# Patient Record
Sex: MALE | Race: WHITE | NOT HISPANIC OR LATINO | Employment: FULL TIME | URBAN - METROPOLITAN AREA
[De-identification: names, ages, dates, MRNs, and addresses within clinical notes are randomized per-mention and may not be internally consistent; named-entity substitution may affect disease eponyms.]

---

## 2017-02-06 ENCOUNTER — TELEPHONE (OUTPATIENT)
Dept: FAMILY MEDICINE | Facility: CLINIC | Age: 62
End: 2017-02-06

## 2017-02-06 DIAGNOSIS — A09 TRAVELER'S DIARRHEA: Primary | ICD-10-CM

## 2017-02-06 RX ORDER — CIPROFLOXACIN 500 MG/1
500 TABLET, FILM COATED ORAL 2 TIMES DAILY
Qty: 6 TABLET | Refills: 0 | Status: SHIPPED | OUTPATIENT
Start: 2017-02-06 | End: 2018-01-12

## 2017-04-03 ENCOUNTER — TELEPHONE (OUTPATIENT)
Dept: FAMILY MEDICINE | Facility: CLINIC | Age: 62
End: 2017-04-03

## 2017-04-03 DIAGNOSIS — J32.9 SINUSITIS, UNSPECIFIED CHRONICITY, UNSPECIFIED LOCATION: Primary | ICD-10-CM

## 2017-04-03 RX ORDER — AZITHROMYCIN 250 MG/1
TABLET, FILM COATED ORAL
Qty: 6 TABLET | Refills: 0 | Status: SHIPPED | OUTPATIENT
Start: 2017-04-03 | End: 2018-01-12

## 2017-04-03 NOTE — TELEPHONE ENCOUNTER
Wife works here as RN so she discussed the symptoms and treatment.  recommended symptomatic treatment  Script of chaim sent to pharmacy.  Appointment if no improvement or symptoms get worse.  Dr.Nasima Yahir MD

## 2017-04-03 NOTE — TELEPHONE ENCOUNTER
Patient has been sick/wheezing for 10 days, wakes up sweating.  Been using nebs.  Wife, KRISHNA Whitmore, has requested a zpak.  Belia Orta CMA

## 2017-07-21 ENCOUNTER — TELEPHONE (OUTPATIENT)
Dept: FAMILY MEDICINE | Facility: CLINIC | Age: 62
End: 2017-07-21

## 2017-07-21 NOTE — TELEPHONE ENCOUNTER
Patient and wife calling to verify when last Tdap was given.  Both on the phone with writer.  Patient had a fishing accident and has 2 fish hooks in his hand.  They are on their way to ER and wanted to know when last Tetanus shot was given.  Per JEANETH, last Tdap 2010 & 2011.  No further questions.  Keerthi Hicks, RN

## 2017-07-24 ENCOUNTER — ALLIED HEALTH/NURSE VISIT (OUTPATIENT)
Dept: NURSING | Facility: CLINIC | Age: 62
End: 2017-07-24
Payer: COMMERCIAL

## 2017-07-24 DIAGNOSIS — Z23 NEED FOR TETANUS BOOSTER: Primary | ICD-10-CM

## 2017-07-24 PROCEDURE — 99207 ZZC NO CHARGE NURSE ONLY: CPT

## 2017-07-24 PROCEDURE — 90715 TDAP VACCINE 7 YRS/> IM: CPT

## 2017-07-24 PROCEDURE — 90471 IMMUNIZATION ADMIN: CPT

## 2017-10-12 ENCOUNTER — ALLIED HEALTH/NURSE VISIT (OUTPATIENT)
Dept: NURSING | Facility: CLINIC | Age: 62
End: 2017-10-12
Payer: COMMERCIAL

## 2017-10-12 DIAGNOSIS — Z23 NEED FOR PROPHYLACTIC VACCINATION AND INOCULATION AGAINST INFLUENZA: Primary | ICD-10-CM

## 2017-10-12 PROCEDURE — 99207 ZZC NO CHARGE NURSE ONLY: CPT

## 2017-10-12 PROCEDURE — 90686 IIV4 VACC NO PRSV 0.5 ML IM: CPT

## 2017-10-12 PROCEDURE — 90471 IMMUNIZATION ADMIN: CPT

## 2017-10-12 NOTE — MR AVS SNAPSHOT
"              After Visit Summary   10/12/2017    Guanako Padilla    MRN: [id number]           Patient Information     Date Of Birth          1955        Visit Information        Provider Department      10/12/2017 9:30 AM CR FLU CLINIC [facility]        Today's Diagnoses     Need for prophylactic vaccination and inoculation against influenza    -  1       Follow-ups after your visit        Who to contact     If you have questions or need follow up information about today's clinic visit or your schedule please contact [facility] directly at [phone].  Normal or non-critical lab and imaging results will be communicated to you by Heroes2uhart, letter or phone within 4 business days after the clinic has received the results. If you do not hear from us within 7 days, please contact the clinic through Heroes2uhart or phone. If you have a critical or abnormal lab result, we will notify you by phone as soon as possible.  Submit refill requests through Eyeview or call your pharmacy and they will forward the refill request to us. Please allow 3 business days for your refill to be completed.          Additional Information About Your Visit        MyChart Information     Eyeview lets you send messages to your doctor, view your test results, renew your prescriptions, schedule appointments and more. To sign up, go to [email or web address] . Click on \"Log in\" on the left side of the screen, which will take you to the Welcome page. Then click on \"Sign up Now\" on the right side of the page.     You will be asked to enter the access code listed below, as well as some personal information. Please follow the directions to create your username and password.     Your access code is: [email or web address]  Expires: 10/22/2017  3:05 PM     Your access code will  in 90 days. If you need help or a new code, please call your [facility] or [phone].        Care EveryWhere ID     This is your Care " EveryWhere ID. This could be used by other organizations to access your [place] medical records  [id number]         Blood Pressure from Last 3 Encounters:   12/21/16 121/79   04/25/16 107/68   01/08/16 110/70    Weight from Last 3 Encounters:   12/21/16 240 lb (108.9 kg)   04/25/16 238 lb 11.2 oz (108.3 kg)   01/08/16 225 lb (102.1 kg)              We Performed the Following     FLU VAC, SPLIT VIRUS IM > 3 YO (QUADRIVALENT) [50585]     Vaccine Administration, Initial [72918]        Primary Care Provider Office Phone # Fax #    Marco Quiles [phone] [address] [address]        Equal Access to Services     [facility]: Hadii aad karlos hadasho Sochris, waaxda luqadaha, qaybta kaalmada adeegyada, shelley dodson . So [facility] [phone].    ATENCIÓN: Si habla español, tiene a santana disposición servicios gratuitos de asistencia lingüística. Eli al [phone].    We comply with applicable federal civil rights laws and [place] laws. We do not discriminate on the basis of race, color, national origin, age, disability, sex, sexual orientation, or gender identity.            Thank you!     Thank you for choosing [facility]  for your care. Our goal is always to provide you with excellent care. Hearing back from our patients is one way we can continue to improve our services. Please take a few minutes to complete the written survey that you may receive in the mail after your visit with us. Thank you!             Your Updated Medication List - Protect others around you: Learn how to safely use, store and throw away your medicines at www.disposemymeds.org.          This list is accurate as of: 10/12/17  9:32 AM.  Always use your most recent med list.                   Brand Name Dispense Instructions for use Diagnosis    azithromycin 250 MG tablet    ZITHROMAX    6 tablet    Two tablets first day, then one tablet daily for  four days.    Sinusitis, unspecified chronicity, unspecified location       celecoxib 100 MG capsule    celeBREX    60 capsule    Take 1 capsule (100 mg) by mouth 2 times daily as needed for moderate pain    Primary osteoarthritis of both knees       ciprofloxacin 500 MG tablet    CIPRO    6 tablet    Take 1 tablet (500 mg) by mouth 2 times daily    Traveler's diarrhea       Daily Multivitamin Tabs      1 TABLET DAILY        MAGNESIUM OXIDE PO      Take by mouth daily        NO ACTIVE MEDICATIONS      .        omeprazole 20 MG CR capsule    priLOSEC    30 capsule    Take 1 capsule (20 mg) by mouth daily    Peptic ulcer       vitamin B complex with vitamin C Tabs tablet      Take 0.5 tablets by mouth daily

## 2017-10-12 NOTE — PROGRESS NOTES
Injectable Influenza Immunization Documentation    1.  Is the person to be vaccinated sick today?   No    2. Does the person to be vaccinated have an allergy to a component   of the vaccine?   No    3. Has the person to be vaccinated ever had a serious reaction   to influenza vaccine in the past?   No    4. Has the person to be vaccinated ever had Guillain-Barré syndrome?   No    Form completed by Chevy Ratliff MA

## 2018-01-12 ENCOUNTER — RADIANT APPOINTMENT (OUTPATIENT)
Dept: GENERAL RADIOLOGY | Facility: CLINIC | Age: 63
End: 2018-01-12
Attending: ORTHOPAEDIC SURGERY
Payer: COMMERCIAL

## 2018-01-12 ENCOUNTER — OFFICE VISIT (OUTPATIENT)
Dept: ORTHOPEDICS | Facility: CLINIC | Age: 63
End: 2018-01-12
Payer: COMMERCIAL

## 2018-01-12 VITALS
SYSTOLIC BLOOD PRESSURE: 112 MMHG | HEIGHT: 74 IN | DIASTOLIC BLOOD PRESSURE: 78 MMHG | WEIGHT: 241 LBS | BODY MASS INDEX: 30.93 KG/M2

## 2018-01-12 DIAGNOSIS — M25.522 LEFT ELBOW PAIN: ICD-10-CM

## 2018-01-12 DIAGNOSIS — M19.022 OSTEOARTHRITIS OF LEFT ELBOW, UNSPECIFIED OSTEOARTHRITIS TYPE: ICD-10-CM

## 2018-01-12 DIAGNOSIS — M18.0 PRIMARY OSTEOARTHRITIS OF BOTH FIRST CARPOMETACARPAL JOINTS: ICD-10-CM

## 2018-01-12 DIAGNOSIS — M25.522 LEFT ELBOW PAIN: Primary | ICD-10-CM

## 2018-01-12 PROCEDURE — 99203 OFFICE O/P NEW LOW 30 MIN: CPT | Performed by: ORTHOPAEDIC SURGERY

## 2018-01-12 PROCEDURE — 73080 X-RAY EXAM OF ELBOW: CPT | Mod: LT | Performed by: ORTHOPAEDIC SURGERY

## 2018-01-12 NOTE — MR AVS SNAPSHOT
"              After Visit Summary   2018    Guanako Padilla    MRN: [id number]           Patient Information     Date Of Birth          1955        Visit Information        Provider Department      2018 9:10 AM Geovanny Lopez MD [facility] ORTHOPEDIC SURGERY        Today's Diagnoses     Left elbow pain    -  1    Osteoarthritis of left elbow, unspecified osteoarthritis type        Primary osteoarthritis of both first carpometacarpal joints           Follow-ups after your visit        Who to contact     If you have questions or need follow up information about today's clinic visit or your schedule please contact [facility] ORTHOPEDIC SURGERY directly at [phone].  Normal or non-critical lab and imaging results will be communicated to you by GTFO Ventureshart, letter or phone within 4 business days after the clinic has received the results. If you do not hear from us within 7 days, please contact the clinic through GT[employer] Ventureshart or phone. If you have a critical or abnormal lab result, we will notify you by phone as soon as possible.  Submit refill requests through Weavly or call your pharmacy and they will forward the refill request to us. Please allow 3 business days for your refill to be completed.          Additional Information About Your Visit        MyChart Information     Weavly lets you send messages to your doctor, view your test results, renew your prescriptions, schedule appointments and more. To sign up, go to [email or web address] . Click on \"Log in\" on the left side of the screen, which will take you to the Welcome page. Then click on \"Sign up Now\" on the right side of the page.     You will be asked to enter the access code listed below, as well as some personal information. Please follow the directions to create your username and password.     Your access code is: [email or web address]  Expires: 2018 10:28 AM     Your access code will  in 90 days. If you need help or a new code, " "please call your [place] clinic or [phone].        Care EveryWhere ID     This is your Care EveryWhere ID. This could be used by other organizations to access your [place] medical records  [id number]        Your Vitals Were     Height BMI (Body Mass Index)                6' 1.5\" (1.867 m) 31.36 kg/m2           Blood Pressure from Last 3 Encounters:   01/12/18 112/78   12/21/16 121/79   04/25/16 107/68    Weight from Last 3 Encounters:   01/12/18 241 lb (109.3 kg)   12/21/16 240 lb (108.9 kg)   04/25/16 238 lb 11.2 oz (108.3 kg)               Primary Care Provider Office Phone # Fax #    Marco Quiles [phone] [address]  [place] [address]        Equal Access to Services     [facility]: Hadii aad ku hadasho Soomaali, waaxda luqadaha, qaybta kaalmada adeegyada, waxay jeronimoin hayjudyn skylar dodson . So [facility] [phone].    ATENCIÓN: Si habla español, tiene a santana disposición servicios gratuitos de asistencia lingüística. Eli al [phone].    We comply with applicable federal civil rights laws and [place] laws. We do not discriminate on the basis of race, color, national origin, age, disability, sex, sexual orientation, or gender identity.            Thank you!     Thank you for choosing [facility] ORTHOPEDIC SURGERY  for your care. Our goal is always to provide you with excellent care. Hearing back from our patients is one way we can continue to improve our services. Please take a few minutes to complete the written survey that you may receive in the mail after your visit with us. Thank you!             Your Updated Medication List - Protect others around you: Learn how to safely use, store and throw away your medicines at www.disposemymeds.org.          This list is accurate as of: 1/12/18 10:28 AM.  Always use your most recent med list.                   Brand Name Dispense Instructions for use Diagnosis    celecoxib 100 MG capsule    " celeBREX    60 capsule    Take 1 capsule (100 mg) by mouth 2 times daily as needed for moderate pain    Primary osteoarthritis of both knees       Daily Multivitamin Tabs      1 TABLET DAILY        MAGNESIUM OXIDE PO      Take by mouth daily        NO ACTIVE MEDICATIONS      .        OMEGA 3 PO           omeprazole 20 MG CR capsule    priLOSEC    30 capsule    Take 1 capsule (20 mg) by mouth daily    Peptic ulcer       vitamin B complex with vitamin C Tabs tablet      Take 0.5 tablets by mouth daily        VITAMIN D3 PO      Take by mouth daily

## 2018-01-12 NOTE — PROGRESS NOTES
HISTORY OF PRESENT ILLNESS:    Guanako Padilla is a 62 year old male who is seen as self referral for left elbow pain    Present symptoms: Pt states elbow pain has been present for over 5 years, denies any known injury to the elbow.  Pt feels pain is deeper in the joint, describes pain as aching, can be sharp at times, feels arm is weak as a result.   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  After he started taking omega-3 compound, he has been feeling better.Before that, he was expressing rather significant pain. He has noted limitation of motion now in the left but also on the right. The right side has not been really painful. He denies any neurologic symptoms  He is right-hand dominant.  He also had significant pain with a left thumb CMC joint. Other than residual deformity, he is not experiencing much of pain at this point.  He has also issues with the left knee. He had cortisone injections in the past.  Treatments tried to this point: omega 3  Orthopedic PMH: right foot ORIF     Past Medical History:   Diagnosis Date     Broken foot     fall     Shingles        Past Surgical History:   Procedure Laterality Date     COLONOSCOPY  1/8/2016    Dr. Covarrubias [facility]     COLONOSCOPY  1/8/2016    Procedure: COLONOSCOPY;  Surgeon: Juan Covarrubias MD;  Location:  GI     ESOPHAGOSCOPY, GASTROSCOPY, DUODENOSCOPY (EGD), COMBINED  1/8/2016    Dr. Satish GOMEZ     ESOPHAGOSCOPY, GASTROSCOPY, DUODENOSCOPY (EGD), COMBINED N/A 1/8/2016    Procedure: COMBINED ESOPHAGOSCOPY, GASTROSCOPY, DUODENOSCOPY (EGD), BIOPSY SINGLE OR MULTIPLE;  Surgeon: Juan Covarrubias MD;  Location:  GI     ORTHOPEDIC SURGERY      shoulder-bilateral     ORTHOPEDIC SURGERY      wrist tendon repair     ORTHOPEDIC SURGERY      right foot fx-metal in foot-ORIF       Family History   Problem Relation Age of Onset     Alzheimer Disease Mother      Dementia     CEREBROVASCULAR DISEASE Father      Colon  Cancer No family hx of        Social History     Social History     Marital status:      Spouse name: N/A     Number of children: N/A     Years of education: N/A     Occupational History     Not on file.     Social History Main Topics     Smoking status: Never Smoker     Smokeless tobacco: Never Used     Alcohol use 0.0 oz/week     0 Standard drinks or equivalent per week      Comment: 1 1/2 beers per day     Drug use: No     Sexual activity: Yes     Partners: Female     Other Topics Concern     Not on file     Social History Narrative    , 3 children    Employed for [employer]       Current Outpatient Prescriptions   Medication Sig Dispense Refill     Cholecalciferol (VITAMIN D3 PO) Take by mouth daily       Omega-3 Fatty Acids (OMEGA 3 PO)        MAGNESIUM OXIDE PO Take by mouth daily       DAILY MULTIVITAMIN PO TABS 1 TABLET DAILY       omeprazole (PRILOSEC) 20 MG capsule Take 1 capsule (20 mg) by mouth daily (Patient not taking: Reported on 1/12/2018) 30 capsule 1     celecoxib (CELEBREX) 100 MG capsule Take 1 capsule (100 mg) by mouth 2 times daily as needed for moderate pain (Patient not taking: Reported on 1/12/2018) 60 capsule 1     vitamin  B complex with vitamin C (VITAMIN  B COMPLEX) TABS Take 0.5 tablets by mouth daily       NO ACTIVE MEDICATIONS .         No Known Allergies    REVIEW OF SYSTEMS:  CONSTITUTIONAL:  NEGATIVE for fever, chills, change in weight  INTEGUMENTARY/SKIN:  NEGATIVE for worrisome rashes, moles or lesions  EYES:  NEGATIVE for vision changes or irritation  ENT/MOUTH:  NEGATIVE for ear, mouth and throat problems  RESP:  NEGATIVE for significant cough or SOB  BREAST:  NEGATIVE for masses, tenderness or discharge  CV:  NEGATIVE for chest pain, palpitations or peripheral edema  GI:  constipation  :  Negative   MUSCULOSKELETAL:  See HPI above  NEURO:  NEGATIVE for weakness, dizziness or paresthesias  ENDOCRINE:  NEGATIVE for temperature intolerance, skin/hair  "changes  HEME/ALLERGY/IMMUNE:  Low blood count  PSYCHIATRIC:  NEGATIVE for changes in mood or affect      PHYSICAL EXAM:  /78 (BP Location: Right arm, Patient Position: Sitting, Cuff Size: Adult Regular)  Ht 6' 1.5\" (1.867 m)  Wt 241 lb (109.3 kg)  BMI 31.36 kg/m2  Body mass index is 31.36 kg/(m^2).   GENERAL APPEARANCE: healthy, alert and no distress   HEENT: No apparent thyroid megaly. Clear sclera with normal ocular movement  RESPIRATORY: No labored breathing  SKIN: no suspicious lesions or rashes  NEURO: Normal strength and tone, mentation intact and speech normal  VASCULAR: Good pulses, and capillary refill   LYMPH: no lymphadenopathy   PSYCH:  mentation appears normal and affect normal/bright    MUSCULOSKELETAL:  Diffuse degenerative changes involving both elbows with limitation of motion  He has flexion contracture of about 20 bilaterally  Supination is lacking 20 on the left  Flexion is more or less full  Well healed lateral incision the right elbow that he had to treat little leaguer's elbow  Extreme supination causes pain at the radiocapitellar joint.  Rather significant deformities of bony prominences at both CMC joints of the thumbs.  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 Rather classic generative changes are recognized. Fortunately, with recent use of omega-3, he is having very little pain.We talked about future treatment option of cortisone injections if he develops similar pain that he had previously.  We also had a long discussion about general approach to dealing with arthritis.  I recommended nonimpact activities such as " exercise biking and swimming along with continue stretching.  He had been doing some wally chi and continuation of that or even doing yoga would be helpful.  Will also recommend a trial of over-the-counter medication and a short spurts, that is, 2-3 weeks at a time.  Follow-up as needed.       Imaging Interpretation:    Three views of the left elbow demonstrate diffuse elbow degenerative changes with joint space  Narrowing and large bone spurs for both humeral-ulnar joint and humeral -radial joint. No acute fractures are noted. No other osseous pathology present.      Geovanny Lopez MD  Department of Orthopedic Surgery        Disclaimer: This note consists of symbols derived from keyboarding, dictation and/or voice recognition software. As a result, there may be errors in the script that have gone undetected. Please consider this when interpreting information found in this chart.

## 2018-01-12 NOTE — LETTER
1/12/2018         RE: Guanako Padilla  [address] [facility] [place] [address]        Dear Colleague,    Thank you for referring your patient, Guanako Padilla, to the [facility] ORTHOPEDIC SURGERY. Please see a copy of my visit note below.    HISTORY OF PRESENT ILLNESS:    Guanako Padilla is a 62 year old male who is seen as self referral for left elbow pain    Present symptoms: Pt states elbow pain has been present for over 5 years, denies any known injury to the elbow.  Pt feels pain is deeper in the joint, describes pain as aching, can be sharp at times, feels arm is weak as a result.   He was a professional  through 1980 He was with [employer] for four years.  After he started taking omega-3 compound, he has been feeling better.Before that, he was expressing rather significant pain. He has noted limitation of motion now in the left but also on the right. The right side has not been really painful. He denies any neurologic symptoms  He is right-hand dominant.  He also had significant pain with a left thumb CMC joint. Other than residual deformity, he is not experiencing much of pain at this point.  He has also issues with the left knee. He had cortisone injections in the past.  Treatments tried to this point: omega 3  Orthopedic PMH: right foot ORIF     Past Medical History:   Diagnosis Date     Broken foot     fall     Shingles        Past Surgical History:   Procedure Laterality Date     COLONOSCOPY  1/8/2016    Dr. Covarrubias [facility]     COLONOSCOPY  1/8/2016    Procedure: COLONOSCOPY;  Surgeon: Juan Covarrubias MD;  Location:  GI     ESOPHAGOSCOPY, GASTROSCOPY, DUODENOSCOPY (EGD), COMBINED  1/8/2016    Dr. Covarrubias [facility]     ESOPHAGOSCOPY, GASTROSCOPY, DUODENOSCOPY (EGD), COMBINED N/A 1/8/2016    Procedure: COMBINED ESOPHAGOSCOPY, GASTROSCOPY, DUODENOSCOPY (EGD), BIOPSY SINGLE OR MULTIPLE;  Surgeon: Juan Covarrubias MD;  Location:  GI     ORTHOPEDIC SURGERY      shoulder-bilateral      ORTHOPEDIC SURGERY      wrist tendon repair     ORTHOPEDIC SURGERY      right foot fx-metal in foot-ORIF       Family History   Problem Relation Age of Onset     Alzheimer Disease Mother      Dementia     CEREBROVASCULAR DISEASE Father      Colon Cancer No family hx of        Social History     Social History     Marital status:      Spouse name: N/A     Number of children: N/A     Years of education: N/A     Occupational History     Not on file.     Social History Main Topics     Smoking status: Never Smoker     Smokeless tobacco: Never Used     Alcohol use 0.0 oz/week     0 Standard drinks or equivalent per week      Comment: 1 1/2 beers per day     Drug use: No     Sexual activity: Yes     Partners: Female     Other Topics Concern     Not on file     Social History Narrative    , 3 children    Employed for [employer]       Current Outpatient Prescriptions   Medication Sig Dispense Refill     Cholecalciferol (VITAMIN D3 PO) Take by mouth daily       Omega-3 Fatty Acids (OMEGA 3 PO)        MAGNESIUM OXIDE PO Take by mouth daily       DAILY MULTIVITAMIN PO TABS 1 TABLET DAILY       omeprazole (PRILOSEC) 20 MG capsule Take 1 capsule (20 mg) by mouth daily (Patient not taking: Reported on 1/12/2018) 30 capsule 1     celecoxib (CELEBREX) 100 MG capsule Take 1 capsule (100 mg) by mouth 2 times daily as needed for moderate pain (Patient not taking: Reported on 1/12/2018) 60 capsule 1     vitamin  B complex with vitamin C (VITAMIN  B COMPLEX) TABS Take 0.5 tablets by mouth daily       NO ACTIVE MEDICATIONS .         No Known Allergies    REVIEW OF SYSTEMS:  CONSTITUTIONAL:  NEGATIVE for fever, chills, change in weight  INTEGUMENTARY/SKIN:  NEGATIVE for worrisome rashes, moles or lesions  EYES:  NEGATIVE for vision changes or irritation  ENT/MOUTH:  NEGATIVE for ear, mouth and throat problems  RESP:  NEGATIVE for significant cough or SOB  BREAST:  NEGATIVE for masses, tenderness or discharge  CV:  NEGATIVE  "for chest pain, palpitations or peripheral edema  GI:  constipation  :  Negative   MUSCULOSKELETAL:  See HPI above  NEURO:  NEGATIVE for weakness, dizziness or paresthesias  ENDOCRINE:  NEGATIVE for temperature intolerance, skin/hair changes  HEME/ALLERGY/IMMUNE:  Low blood count  PSYCHIATRIC:  NEGATIVE for changes in mood or affect      PHYSICAL EXAM:  /78 (BP Location: Right arm, Patient Position: Sitting, Cuff Size: Adult Regular)  Ht 6' 1.5\" (1.867 m)  Wt 241 lb (109.3 kg)  BMI 31.36 kg/m2  Body mass index is 31.36 kg/(m^2).   GENERAL APPEARANCE: healthy, alert and no distress   HEENT: No apparent thyroid megaly. Clear sclera with normal ocular movement  RESPIRATORY: No labored breathing  SKIN: no suspicious lesions or rashes  NEURO: Normal strength and tone, mentation intact and speech normal  VASCULAR: Good pulses, and capillary refill   LYMPH: no lymphadenopathy   PSYCH:  mentation appears normal and affect normal/bright    MUSCULOSKELETAL:  Diffuse degenerative changes involving both elbows with limitation of motion  He has flexion contracture of about 20 bilaterally  Supination is lacking 20 on the left  Flexion is more or less full  Well healed lateral incision the right elbow that he had to treat little leaguer's elbow  Extreme supination causes pain at the radiocapitellar joint.  Rather significant deformities of bony prominences at both CMC joints of the thumbs.  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 Rather classic generative changes are recognized. Fortunately, with recent use of omega-3, he is having very little pain.We " talked about future treatment option of cortisone injections if he develops similar pain that he had previously.  We also had a long discussion about general approach to dealing with arthritis.  I recommended nonimpact activities such as exercise biking and swimming along with continue stretching.  He had been doing some wally chi and continuation of that or even doing yoga would be helpful.  Will also recommend a trial of over-the-counter medication and a short spurts, that is, 2-3 weeks at a time.  Follow-up as needed.       Imaging Interpretation:    Three views of the left elbow demonstrate diffuse elbow degenerative changes with joint space  Narrowing and large bone spurs for both humeral-ulnar joint and humeral -radial joint. No acute fractures are noted. No other osseous pathology present.      Geovanny Lopez MD  Department of Orthopedic Surgery        Disclaimer: This note consists of symbols derived from keyboarding, dictation and/or voice recognition software. As a result, there may be errors in the script that have gone undetected. Please consider this when interpreting information found in this chart.      Again, thank you for allowing me to participate in the care of your patient.        Sincerely,        Geovanny Lopez MD

## 2018-01-12 NOTE — NURSING NOTE
"Chief Complaint   Patient presents with     Elbow Pain     Left elbow       Initial /78 (BP Location: Right arm, Patient Position: Sitting, Cuff Size: Adult Regular)  Ht 6' 1.5\" (1.867 m)  Wt 241 lb (109.3 kg)  BMI 31.36 kg/m2 Estimated body mass index is 31.36 kg/(m^2) as calculated from the following:    Height as of this encounter: 6' 1.5\" (1.867 m).    Weight as of this encounter: 241 lb (109.3 kg).  Medication Reconciliation: complete    "

## 2018-11-16 ENCOUNTER — OFFICE VISIT (OUTPATIENT)
Dept: FAMILY MEDICINE | Facility: CLINIC | Age: 63
End: 2018-11-16
Payer: COMMERCIAL

## 2018-11-16 VITALS
SYSTOLIC BLOOD PRESSURE: 125 MMHG | OXYGEN SATURATION: 98 % | BODY MASS INDEX: 31.06 KG/M2 | DIASTOLIC BLOOD PRESSURE: 81 MMHG | WEIGHT: 242 LBS | HEART RATE: 65 BPM | HEIGHT: 74 IN | TEMPERATURE: 97.9 F | RESPIRATION RATE: 16 BRPM

## 2018-11-16 DIAGNOSIS — M19.029 ARTHRITIS OF ELBOW: ICD-10-CM

## 2018-11-16 DIAGNOSIS — Z00.01 ENCOUNTER FOR ROUTINE ADULT MEDICAL EXAM WITH ABNORMAL FINDINGS: Primary | ICD-10-CM

## 2018-11-16 DIAGNOSIS — Z23 NEED FOR PROPHYLACTIC VACCINATION AND INOCULATION AGAINST INFLUENZA: ICD-10-CM

## 2018-11-16 DIAGNOSIS — Z11.4 SCREENING FOR HIV (HUMAN IMMUNODEFICIENCY VIRUS): ICD-10-CM

## 2018-11-16 DIAGNOSIS — M17.0 PRIMARY OSTEOARTHRITIS OF BOTH KNEES: ICD-10-CM

## 2018-11-16 LAB
CHOLEST SERPL-MCNC: 205 MG/DL
HDLC SERPL-MCNC: 74 MG/DL
LDLC SERPL CALC-MCNC: 112 MG/DL
NONHDLC SERPL-MCNC: 131 MG/DL
PSA SERPL-ACNC: 0.26 UG/L (ref 0–4)
TRIGL SERPL-MCNC: 95 MG/DL

## 2018-11-16 PROCEDURE — 80061 LIPID PANEL: CPT | Performed by: FAMILY MEDICINE

## 2018-11-16 PROCEDURE — 90471 IMMUNIZATION ADMIN: CPT | Performed by: FAMILY MEDICINE

## 2018-11-16 PROCEDURE — 99396 PREV VISIT EST AGE 40-64: CPT | Mod: 25 | Performed by: FAMILY MEDICINE

## 2018-11-16 PROCEDURE — 36415 COLL VENOUS BLD VENIPUNCTURE: CPT | Performed by: FAMILY MEDICINE

## 2018-11-16 PROCEDURE — 87389 HIV-1 AG W/HIV-1&-2 AB AG IA: CPT | Performed by: FAMILY MEDICINE

## 2018-11-16 PROCEDURE — 90682 RIV4 VACC RECOMBINANT DNA IM: CPT | Performed by: FAMILY MEDICINE

## 2018-11-16 PROCEDURE — 99213 OFFICE O/P EST LOW 20 MIN: CPT | Mod: 25 | Performed by: FAMILY MEDICINE

## 2018-11-16 PROCEDURE — G0103 PSA SCREENING: HCPCS | Performed by: FAMILY MEDICINE

## 2018-11-16 ASSESSMENT — ENCOUNTER SYMPTOMS
DIZZINESS: 0
EYE PAIN: 0
ABDOMINAL PAIN: 0
HEMATOCHEZIA: 0
DIARRHEA: 0
COUGH: 0
HEMATURIA: 0
CHILLS: 0
CONSTIPATION: 0

## 2018-11-16 NOTE — PROGRESS NOTES
SUBJECTIVE:   CC: Guanako Padilla is an 63 year old male who presents for preventative health visit.     Healthy Habits:  Answers for HPI/ROS submitted by the patient on 11/16/2018   Annual Exam:  Frequency of exercise:: 2-3 days/week  Getting at least 3 servings of Calcium per day:: NO  Diet:: Regular (no restrictions)  Taking medications regularly:: Yes  Medication side effects:: None  Bi-annual eye exam:: Yes  Dental care twice a year:: Yes  Sleep apnea or symptoms of sleep apnea:: None  Negative for the following: abdominal pain, Blood in stool, Blood in urine, chest pain, chills, congestion, constipation, cough, diarrhea, dizziness, ear pain, eye pain, nervous/anxious, fever, frequency, genital sores, headaches, hearing loss, heartburn, arthralgias, joint swelling, peripheral edema, mood changes, myalgias, nausea, dysuria, palpitations, Skin sensation changes, sore throat, urgency, rash, shortness of breath, visual disturbance, weakness  Additional concerns today:: No  PHQ-2 Score: 0  Duration of exercise:: 15-30 minutes           Today's PHQ-2 Score:   PHQ-2 ( 1999 Pfizer) 11/16/2018 4/25/2016   Q1: Little interest or pleasure in doing things 0 0   Q2: Feeling down, depressed or hopeless 0 0   PHQ-2 Score 0 0   Q1: Little interest or pleasure in doing things Not at all -   Q2: Feeling down, depressed or hopeless Not at all -   PHQ-2 Score 0 -       Abuse: Current or Past(Physical, Sexual or Emotional)- No  Do you feel safe in your environment - Yes    Social History   Substance Use Topics     Smoking status: Current Every Day Smoker     Smokeless tobacco: Never Used     Alcohol use 0.0 oz/week     0 Standard drinks or equivalent per week      Comment: 1 1/2 beers per day      If you drink alcohol do you typically have >3 drinks per day or >7 drinks per week? Yes - AUDIT SCORE:     No flowsheet data found.                      Last PSA:   PSA   Date Value Ref Range Status   04/25/2016 0.21 0 - 4 ug/L Final  "      Reviewed orders with patient. Reviewed health maintenance and updated orders accordingly - Yes  Labs reviewed in EPIC  BP Readings from Last 3 Encounters:   11/16/18 125/81   01/12/18 112/78   12/21/16 121/79    Wt Readings from Last 3 Encounters:   11/16/18 242 lb (109.8 kg)   01/12/18 241 lb (109.3 kg)   12/21/16 240 lb (108.9 kg)                  Patient Active Problem List   Diagnosis     Disorder of bursae and tendons in shoulder region     Shoulder pain     Shoulder impingement syndrome     Internal derangement of shoulder     CARDIOVASCULAR SCREENING; LDL GOAL LESS THAN 160     Pseudogout     Peptic ulcer     Primary osteoarthritis of both knees     Iron deficiency anemia due to chronic blood loss     Past Surgical History:   Procedure Laterality Date     [facility]  [facility]2016    [facility]     COLONOSCOPY  1/8/2016    Procedure: COLONOSCOPY;  Surgeon: Juan Covarrubias MD;  Location:  GI     ESOPHAGOSCOPY, GASTROSCOPY, DUODENOSCOPY (EGD), COMBINED  1/8/2016    [facility]     ESOPHAGOSCOPY, GASTROSCOPY, DUODENOSCOPY (EGD), COMBINED N/A 1/8/2016    Procedure: COMBINED ESOPHAGOSCOPY, GASTROSCOPY, DUODENOSCOPY (EGD), BIOPSY SINGLE OR MULTIPLE;  Surgeon: Juan Covarrubias MD;  Location:  GI     ORTHOPEDIC SURGERY Left 1976    left shoulder, rotator cuff repair     ORTHOPEDIC SURGERY Right 1980    wrist tendon repair     ORTHOPEDIC SURGERY Right 2011    right foot fx-metal in foot-ORIF     ORTHOPEDIC SURGERY Right 1981    Right elbow, dx \"little league elbow\", it was cleaned out     ORTHOPEDIC SURGERY Right 2008    Right shoulder, rotator cuff repair       Social History   Substance Use Topics     Smoking status: Current Every Day Smoker     Smokeless tobacco: Never Used     Alcohol use 0.0 oz/week     0 Standard drinks or equivalent per week      Comment: 1 1/2 beers per day     Family History   Problem Relation Age of Onset     Alzheimer Disease Mother      Dementia     Cerebrovascular " "Disease Father      Cancer Maternal Grandfather      Colon Cancer No family hx of          Current Outpatient Prescriptions   Medication Sig Dispense Refill     Cholecalciferol (VITAMIN D3 PO) Take by mouth daily       MAGNESIUM OXIDE PO Take by mouth daily       Pediatric Multivitamins-Iron (FLINTSTONES PLUS IRON PO)        No Known Allergies    Reviewed and updated as needed this visit by clinical staff  Tobacco  Allergies  Meds  Med Hx  Surg Hx  Fam Hx  Soc Hx        Reviewed and updated as needed this visit by Provider      pt started smoking about 10 years ago, and 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   He denies any symptoms. Kevin coughing, or clearing the throat.  Past Medical History:   Diagnosis Date     Broken foot     fall     Shingles       Past Surgical History:   Procedure Laterality Date     COLONOSCOPY  1/8/2016    Dr. Covarrubias [facility]     COLONOSCOPY  1/8/2016    Procedure: COLONOSCOPY;  Surgeon: Juan Covarrubias MD;  Location:  GI     ESOPHAGOSCOPY, GASTROSCOPY, DUODENOSCOPY (EGD), COMBINED  1/8/2016    Dr. Covarrubias [facility]     ESOPHAGOSCOPY, GASTROSCOPY, DUODENOSCOPY (EGD), COMBINED N/A 1/8/2016    Procedure: COMBINED ESOPHAGOSCOPY, GASTROSCOPY, DUODENOSCOPY (EGD), BIOPSY SINGLE OR MULTIPLE;  Surgeon: Juan Covarrubias MD;  Location:  GI     ORTHOPEDIC SURGERY Left 1976    left shoulder, rotator cuff repair     ORTHOPEDIC SURGERY Right 1980    wrist tendon repair     ORTHOPEDIC SURGERY Right 2011    right foot fx-metal in foot-ORIF     ORTHOPEDIC SURGERY Right 1981    Right elbow, dx \"little league elbow\", it was cleaned out     ORTHOPEDIC SURGERY Right 2008    Right shoulder, rotator cuff repair     Musculoskeletal problem/pain      Duration: 3 years    Description  Location:  Multiple, mainly in the elbows, knees and hands.    Intensity:  mild, moderate    Accompanying signs and symptoms: lack of dexterity  in the fingers " "    History  Previous similar problem: YES  Previous evaluation:  x-ray of the elbow    Precipitating or alleviating factors:  Trauma or overuse: no   Aggravating factors include: using the joints, and cold weather.    Therapies tried and outcome: exercises       ROS:  CONSTITUTIONAL: NEGATIVE for fever, chills, change in weight  INTEGUMENTARY/SKIN: NEGATIVE for worrisome rashes, moles or lesions  EYES: NEGATIVE for vision changes or irritation  ENT: NEGATIVE for ear, mouth and throat problems  RESP: NEGATIVE for significant cough or SOB  CV: NEGATIVE for chest pain, palpitations or peripheral edema  GI: NEGATIVE for nausea, abdominal pain, heartburn, or change in bowel habits   male: negative for dysuria, hematuria, decreased urinary stream, erectile dysfunction, urethral discharge  MUSCULOSKELETAL: NEGATIVE for significant arthralgias or myalgia  NEURO: NEGATIVE for weakness, dizziness or paresthesias  PSYCHIATRIC: NEGATIVE for changes in mood or affect    OBJECTIVE:   /81 (BP Location: Right arm, Patient Position: Chair, Cuff Size: Adult Large)  Pulse 65  Temp 97.9  F (36.6  C) (Oral)  Resp 16  Ht 6' 2\" (1.88 m)  Wt 242 lb (109.8 kg)  SpO2 98%  BMI 31.07 kg/m2  EXAM:  GENERAL: healthy, alert and no distress  EYES: Eyes grossly normal to inspection, PERRL and conjunctivae and sclerae normal  HENT: ear canals and TM's normal, nose and mouth without ulcers or lesions  NECK: no adenopathy, no asymmetry, masses, or scars and thyroid normal to palpation  RESP: lungs clear to auscultation - no rales, rhonchi or wheezes  CV: regular rate and rhythm, normal S1 S2, no S3 or S4, no murmur, click or rub, no peripheral edema and peripheral pulses strong  ABDOMEN: soft, nontender, no hepatosplenomegaly, no masses and bowel sounds normal  MS: limited ROM of the elbows especially the left side, also hypertrophy of the joints in the hands, (mainly DIP)  SKIN: seborrhoic keratosis lesions in the back.  NEURO: Normal " "strength and tone, mentation intact and speech normal  PSYCH: mentation appears normal, affect normal/bright        ASSESSMENT/PLAN:   1. Encounter for routine adult medical exam with abnormal findings  Today I counseled the patient about diet, regular exercise and weight loss planning.    - Lipid panel reflex to direct LDL Fasting  - HIV Screening  - Prostate spec antigen screen    2. Screening for HIV (human immunodeficiency virus)  HIV test.    3. Arthritis of elbow, hands and knees.  Advised about weight loss, strengthening exercises, and may use MOtrin as needed, heat.      COUNSELING:  Reviewed preventive health counseling, as reflected in patient instructions       Regular exercise       Healthy diet/nutrition    BP Readings from Last 1 Encounters:   11/16/18 125/81     Estimated body mass index is 31.07 kg/(m^2) as calculated from the following:    Height as of this encounter: 6' 2\" (1.88 m).    Weight as of this encounter: 242 lb (109.8 kg).      Weight management plan: Discussed healthy diet and exercise guidelines and patient will follow up in 12 months in clinic to re-evaluate.     reports that he has been smoking.  He has never used smokeless tobacco.      Counseling Resources:  ATP IV Guidelines  Pooled Cohorts Equation Calculator  FRAX Risk Assessment  ICSI Preventive Guidelines  Dietary Guidelines for Americans, 2010  USDA's MyPlate  ASA Prophylaxis  Lung CA Screening    Gricel Woo MD  [facility]  "

## 2018-11-16 NOTE — MR AVS SNAPSHOT
After Visit Summary   11/16/2018    Guanako Padilla    MRN: [id number]           Patient Information     Date Of Birth          1955        Visit Information        Provider Department      11/16/2018 10:30 AM Gricel Woo MD [facility]        Today's Diagnoses     Encounter for routine adult medical exam with abnormal findings    -  1    Screening for HIV (human immunodeficiency virus)        Arthritis of elbow          Care Instructions      Preventive Health Recommendations  Male Ages 50 - 64    Yearly exam:             See your health care provider every year in order to  o   Review health changes.   o   Discuss preventive care.    o   Review your medicines if your doctor has prescribed any.     Have a cholesterol test every 5 years, or more frequently if you are at risk for high cholesterol/heart disease.     Have a diabetes test (fasting glucose) every three years. If you are at risk for diabetes, you should have this test more often.     Have a colonoscopy at age 50, or have a yearly FIT test (stool test). These exams will check for colon cancer.      Talk with your health care provider about whether or not a prostate cancer screening test (PSA) is right for you.    You should be tested each year for STDs (sexually transmitted diseases), if you re at risk.     Shots: Get a flu shot each year. Get a tetanus shot every 10 years.     Nutrition:    Eat at least 5 servings of fruits and vegetables daily.     Eat whole-grain bread, whole-wheat pasta and brown rice instead of white grains and rice.     Get adequate Calcium and Vitamin D.     Lifestyle    Exercise for at least 150 minutes a week (30 minutes a day, 5 days a week). This will help you control your weight and prevent disease.     Limit alcohol to one drink per day.     No smoking.     Wear sunscreen to prevent skin cancer.     See your dentist every six months for an exam and cleaning.     See your eye doctor every 1  "to 2 years.            Follow-ups after your visit        Follow-up notes from your care team     Return in about 1 year (around 11/16/2019).      Who to contact     If you have questions or need follow up information about today's clinic visit or your schedule please contact [facility] directly at [phone].  Normal or non-critical lab and imaging results will be communicated to you by MyChart, letter or phone within 4 business days after the clinic has received the results. If you do not hear from us within 7 days, please contact the clinic through [employer] or phone. If you have a critical or abnormal lab result, we will notify you by phone as soon as possible.  Submit refill requests through [employer] or call your pharmacy and they will forward the refill request to us. Please allow 3 business days for your refill to be completed.          Additional Information About Your Visit        MyChart Information     [employer] gives you secure access to your electronic health record. If you see a primary care provider, you can also send messages to your care team and make appointments. If you have questions, please call your primary care clinic.  If you do not have a primary care provider, please call [phone] and they will assist you.        Care EveryWhere ID     This is your Care EveryWhere ID. This could be used by other organizations to access your [facility] medical records  [id number]        Your Vitals Were     Pulse Temperature Respirations Height Pulse Oximetry BMI (Body Mass Index)    65 97.9  F (36.6  C) (Oral) 16 6' 2\" (1.88 m) 98% 31.07 kg/m2       Blood Pressure from Last 3 Encounters:   11/16/18 125/81   01/12/18 112/78   12/21/16 121/79    Weight from Last 3 Encounters:   11/16/18 242 lb (109.8 kg)   01/12/18 241 lb (109.3 kg)   12/21/16 240 lb (108.9 kg)              We Performed the Following     HIV Screening     Lipid panel reflex to direct LDL Fasting     Prostate spec antigen " screen        Primary Care Provider Office Phone # Fax #    Gricel Woo [phone] [address] [facility] [address]        Equal Access to Services     BOOKERASHLEY NADIA : Hadii aad ku hadlaquitaquynh Davis, wanenoda luqadaha, qaybta kaalmada lorraine, shelley kaur laArminkarl jenkins. So [facility] [phone].    ATENCIÓN: Si habla español, tiene a santana disposición servicios gratuitos de asistencia lingüística. Llame al [phone].    We comply with applicable federal civil rights laws and [place] laws. We do not discriminate on the basis of race, color, national origin, age, disability, sex, sexual orientation, or gender identity.            Thank you!     Thank you for choosing [facility]  for your care. Our goal is always to provide you with excellent care. Hearing back from our patients is one way we can continue to improve our services. Please take a few minutes to complete the written survey that you may receive in the mail after your visit with us. Thank you!             Your Updated Medication List - Protect others around you: Learn how to safely use, store and throw away your medicines at www.disposemymeds.org.          This list is accurate as of 11/16/18 11:04 AM.  Always use your most recent med list.                   Brand Name Dispense Instructions for use Diagnosis    FLINTSTONES PLUS IRON PO           MAGNESIUM OXIDE PO      Take by mouth daily        VITAMIN D3 PO      Take by mouth daily

## 2018-11-16 NOTE — PROGRESS NOTES

## 2018-11-19 LAB — HIV 1+2 AB+HIV1 P24 AG SERPL QL IA: NONREACTIVE

## 2019-01-23 ENCOUNTER — OFFICE VISIT (OUTPATIENT)
Dept: FAMILY MEDICINE | Facility: CLINIC | Age: 64
End: 2019-01-23
Payer: COMMERCIAL

## 2019-01-23 VITALS
OXYGEN SATURATION: 98 % | SYSTOLIC BLOOD PRESSURE: 118 MMHG | RESPIRATION RATE: 16 BRPM | TEMPERATURE: 98.3 F | DIASTOLIC BLOOD PRESSURE: 76 MMHG | WEIGHT: 241 LBS | HEART RATE: 98 BPM | BODY MASS INDEX: 30.94 KG/M2

## 2019-01-23 DIAGNOSIS — J01.90 ACUTE SINUSITIS WITH SYMPTOMS > 10 DAYS: Primary | ICD-10-CM

## 2019-01-23 PROCEDURE — 99213 OFFICE O/P EST LOW 20 MIN: CPT | Performed by: PHYSICIAN ASSISTANT

## 2019-01-23 RX ORDER — AMOXICILLIN 500 MG
CAPSULE ORAL
COMMUNITY
End: 2020-08-25

## 2019-01-23 NOTE — PROGRESS NOTES
"  SUBJECTIVE:   Guanako Padilla is a 64 year old male who presents to clinic today for the following health issues:      Acute Illness   Acute illness concerns: sinus   Onset: 1 month- gets better for a day or two and then worsens again    Fever: no     Chills/Sweats: YES    Headache (location?): YES    Sinus Pressure:YES    Conjunctivitis:  no    Ear Pain: no    Rhinorrhea: YES    Congestion: YES    Sore Throat: no      Cough: YES-non-productive, productive of yellow sputum    Wheeze: YES    Decreased Appetite: no     Nausea: no    Vomiting: no    Diarrhea:  no    Dysuria/Freq.: no    Fatigue/Achiness: YES    Sick/Strep Exposure: no      Therapies Tried and outcome: none      Problem list and histories reviewed & adjusted, as indicated.  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 Covarrubias [facility]     COLONOSCOPY  1/8/2016    Procedure: COLONOSCOPY;  Surgeon: Juan Covarrubias MD;  Location:  GI     ESOPHAGOSCOPY, GASTROSCOPY, DUODENOSCOPY (EGD), COMBINED  1/8/2016    Dr. Covarrubias [facility]     ESOPHAGOSCOPY, GASTROSCOPY, DUODENOSCOPY (EGD), COMBINED N/A 1/8/2016    Procedure: COMBINED ESOPHAGOSCOPY, GASTROSCOPY, DUODENOSCOPY (EGD), BIOPSY SINGLE OR MULTIPLE;  Surgeon: Juan Covarrubias MD;  Location:  GI     ORTHOPEDIC SURGERY Left 1976    left shoulder, rotator cuff repair     ORTHOPEDIC SURGERY Right 1980    wrist tendon repair     ORTHOPEDIC SURGERY Right 2011    right foot fx-metal in foot-ORIF     ORTHOPEDIC SURGERY Right 1981    Right elbow, dx \"little " "league elbow\", it was cleaned out     ORTHOPEDIC SURGERY Right 2008    Right shoulder, rotator cuff repair       Social History     Tobacco Use     Smoking status: Current Every Day Smoker     Smokeless tobacco: Never Used   Substance Use Topics     Alcohol use: Yes     Alcohol/week: 0.0 oz     Comment: 1 1/2 beers per day     Family History   Problem Relation Age of Onset     Alzheimer Disease Mother         Dementia     Cerebrovascular Disease Father      Cancer Maternal Grandfather      Colon Cancer No family hx of          Current Outpatient Medications   Medication Sig Dispense Refill     Cholecalciferol (VITAMIN D3 PO) Take by mouth daily       MAGNESIUM OXIDE PO Take by mouth daily       Omega-3 Fatty Acids (FISH OIL) 1200 MG capsule        Pediatric Multivitamins-Iron (FLINTSTONES PLUS IRON PO)        No Known Allergies    Reviewed and updated as needed this visit by clinical staff       Reviewed and updated as needed this visit by Provider         ROS:  Constitutional, HEENT, cardiovascular, pulmonary, gi and gu systems are negative, except as otherwise noted.    OBJECTIVE:     /76 (BP Location: Right arm, Patient Position: Chair, Cuff Size: Adult Regular)   Pulse 98   Temp 98.3  F (36.8  C) (Oral)   Resp 16   Wt 109.3 kg (241 lb)   SpO2 98%   BMI 30.94 kg/m    Body mass index is 30.94 kg/m .  GENERAL: healthy, alert and no distress  EYES: Eyes grossly normal to inspection, PERRL and conjunctivae and sclerae normal  HENT: ear canals and TM's normal, nose and mouth without ulcers or lesions  RESP: lungs clear to auscultation - no rales, rhonchi or wheezes  CV: regular rate and rhythm, normal S1 S2, no S3 or S4, no murmur, click or rub, no peripheral edema and peripheral pulses strong  MS: no gross musculoskeletal defects noted, no edema  SKIN: no suspicious lesions or rashes  NEURO: Normal strength and tone, mentation intact and speech normal  PSYCH: mentation appears normal, affect " normal/bright  LYMPH: no cervical, supraclavicular, axillary, or inguinal adenopathy    Diagnostic Test Results:  none     ASSESSMENT/PLAN:       (J01.90) Acute sinusitis with symptoms > 10 days  (primary encounter diagnosis)    Comment: Treat with augmentin. He is having rotator cuff surgery in 1 week. He will contact the surgeon's office to make sure it is ok to keep taking medication up to and after surgery. Recommended probiotic or yogurt while taking antibiotic.    Plan: amoxicillin-clavulanate (AUGMENTIN) 875-125 MG         tablet              Patient Instructions     Patient Education     Sinusitis (Antibiotic Treatment)    The sinuses are air-filled spaces within the bones of the face. They connect to the inside of the nose. Sinusitis is an inflammation of the tissue that lines the sinuses. Sinusitis can occur during a cold. It can also happen due to allergies to pollens and other particles in the air. Sinusitis can cause symptoms of sinus congestion and a feeling of fullness. A sinus infection causes fever, headache, and facial pain. There is often green or yellow fluid draining from the nose or into the back of the throat (post-nasal drip). You have been given antibiotics to treat this condition.  Home care    Take the full course of antibiotics as instructed. Do not stop taking them, even when you feel better.    Drink plenty of water, hot tea, and other liquids. This may help thin nasal mucus. It also may help your sinuses drain fluids.    Heat may help soothe painful areas of your face. Use a towel soaked in hot water. Or,  the shower and direct the warm spray onto your face. Using a vaporizer along with a menthol rub at night may also help soothe symptoms.     An expectorant with guaifenesin may help thin nasal mucus and help your sinuses drain fluids.    You can use an over-the-counter decongestant, unless a similar medicine was prescribed to you. Nasal sprays work the fastest. Use one that  contains phenylephrine or oxymetazoline. First blow your nose gently. Then use the spray. Do not use these medicines more often than directed on the label. If you do, your symptoms may get worse. You may also take pills that contain pseudoephedrine. Don t use products that combine multiple medicines. This is because side effects may be increased. Read labels. You can also ask the pharmacist for help. (People with high blood pressure should not use decongestants. They can raise blood pressure.)    Over-the-counter antihistamines may help if allergies contributed to your sinusitis.      Do not use nasal rinses or irrigation during an acute sinus infection, unless your healthcare provider tells you to. Rinsing may spread the infection to other areas in your sinuses.    Use acetaminophen or ibuprofen to control pain, unless another pain medicine was prescribed to you. If you have chronic liver or kidney disease or ever had a stomach ulcer, talk with your healthcare provider before using these medicines. (Aspirin should never be taken by anyone under age 18 who is ill with a fever. It may cause severe liver damage.)    Don't smoke. This can make symptoms worse.  Follow-up care  Follow up with your healthcare provider or our staff if you are better in 1 week.  When to seek medical advice  Call your healthcare provider if any of these occur:    Facial pain or headache that gets worse    Stiff neck    Unusual drowsiness or confusion    Swelling of your forehead or eyelids    Vision problems, such as blurred or double vision    Fever of 100.4 F (38 C) or higher, or as directed by your healthcare provider    Seizure    Breathing problems    Symptoms don't go away in 10 days  Prevention  Here are steps you can take to help prevent an infection:    Keep good hand washing habits.    Don t have close contact with people who have sore throats, colds, or other upper respiratory infections.    Don t smoke, and stay away from  secondhand smoke.    Stay up to date with of your vaccines.  Date Last Reviewed: 11/1/2017 2000-2018 [employer]. [address], [place] [address]. All rights reserved. This information is not intended as a substitute for professional medical care. Always follow your healthcare professional's instructions.               Phillip Saldana PA-C  [facility]

## 2019-01-23 NOTE — PATIENT INSTRUCTIONS
Patient Education     Sinusitis (Antibiotic Treatment)    The sinuses are air-filled spaces within the bones of the face. They connect to the inside of the nose. Sinusitis is an inflammation of the tissue that lines the sinuses. Sinusitis can occur during a cold. It can also happen due to allergies to pollens and other particles in the air. Sinusitis can cause symptoms of sinus congestion and a feeling of fullness. A sinus infection causes fever, headache, and facial pain. There is often green or yellow fluid draining from the nose or into the back of the throat (post-nasal drip). You have been given antibiotics to treat this condition.  Home care    Take the full course of antibiotics as instructed. Do not stop taking them, even when you feel better.    Drink plenty of water, hot tea, and other liquids. This may help thin nasal mucus. It also may help your sinuses drain fluids.    Heat may help soothe painful areas of your face. Use a towel soaked in hot water. Or,  the shower and direct the warm spray onto your face. Using a vaporizer along with a menthol rub at night may also help soothe symptoms.     An expectorant with guaifenesin may help thin nasal mucus and help your sinuses drain fluids.    You can use an over-the-counter decongestant, unless a similar medicine was prescribed to you. Nasal sprays work the fastest. Use one that contains phenylephrine or oxymetazoline. First blow your nose gently. Then use the spray. Do not use these medicines more often than directed on the label. If you do, your symptoms may get worse. You may also take pills that contain pseudoephedrine. Don t use products that combine multiple medicines. This is because side effects may be increased. Read labels. You can also ask the pharmacist for help. (People with high blood pressure should not use decongestants. They can raise blood pressure.)    Over-the-counter antihistamines may help if allergies contributed to your  sinusitis.      Do not use nasal rinses or irrigation during an acute sinus infection, unless your healthcare provider tells you to. Rinsing may spread the infection to other areas in your sinuses.    Use acetaminophen or ibuprofen to control pain, unless another pain medicine was prescribed to you. If you have chronic liver or kidney disease or ever had a stomach ulcer, talk with your healthcare provider before using these medicines. (Aspirin should never be taken by anyone under age 18 who is ill with a fever. It may cause severe liver damage.)    Don't smoke. This can make symptoms worse.  Follow-up care  Follow up with your healthcare provider or our staff if you are better in 1 week.  When to seek medical advice  Call your healthcare provider if any of these occur:    Facial pain or headache that gets worse    Stiff neck    Unusual drowsiness or confusion    Swelling of your forehead or eyelids    Vision problems, such as blurred or double vision    Fever of 100.4 F (38 C) or higher, or as directed by your healthcare provider    Seizure    Breathing problems    Symptoms don't go away in 10 days  Prevention  Here are steps you can take to help prevent an infection:    Keep good hand washing habits.    Don t have close contact with people who have sore throats, colds, or other upper respiratory infections.    Don t smoke, and stay away from secondhand smoke.    Stay up to date with of your vaccines.  Date Last Reviewed: 11/1/2017 2000-2018 [employer]. [address] [place] [address], [place] [address]. All rights reserved. This information is not intended as a substitute for professional medical care. Always follow your healthcare professional's instructions.

## 2019-01-24 ENCOUNTER — OFFICE VISIT (OUTPATIENT)
Dept: FAMILY MEDICINE | Facility: CLINIC | Age: 64
End: 2019-01-24
Payer: COMMERCIAL

## 2019-01-24 VITALS
HEART RATE: 85 BPM | RESPIRATION RATE: 25 BRPM | SYSTOLIC BLOOD PRESSURE: 115 MMHG | WEIGHT: 242 LBS | HEIGHT: 75 IN | OXYGEN SATURATION: 95 % | TEMPERATURE: 98.5 F | BODY MASS INDEX: 30.09 KG/M2 | DIASTOLIC BLOOD PRESSURE: 74 MMHG

## 2019-01-24 DIAGNOSIS — M75.111 INCOMPLETE TEAR OF RIGHT ROTATOR CUFF: ICD-10-CM

## 2019-01-24 DIAGNOSIS — Z01.818 PREOP GENERAL PHYSICAL EXAM: Primary | ICD-10-CM

## 2019-01-24 DIAGNOSIS — M25.421 SWELLING OF RIGHT ELBOW: ICD-10-CM

## 2019-01-24 LAB
ERYTHROCYTE [DISTWIDTH] IN BLOOD BY AUTOMATED COUNT: 14.1 % (ref 10–15)
HCT VFR BLD AUTO: 39.5 % (ref 40–53)
HGB BLD-MCNC: 12.6 G/DL (ref 13.3–17.7)
MCH RBC QN AUTO: 28.7 PG (ref 26.5–33)
MCHC RBC AUTO-ENTMCNC: 31.9 G/DL (ref 31.5–36.5)
MCV RBC AUTO: 90 FL (ref 78–100)
PLATELET # BLD AUTO: 265 10E9/L (ref 150–450)
RBC # BLD AUTO: 4.39 10E12/L (ref 4.4–5.9)
WBC # BLD AUTO: 6.9 10E9/L (ref 4–11)

## 2019-01-24 PROCEDURE — 36415 COLL VENOUS BLD VENIPUNCTURE: CPT | Performed by: FAMILY MEDICINE

## 2019-01-24 PROCEDURE — 80048 BASIC METABOLIC PNL TOTAL CA: CPT | Performed by: FAMILY MEDICINE

## 2019-01-24 PROCEDURE — 99215 OFFICE O/P EST HI 40 MIN: CPT | Performed by: FAMILY MEDICINE

## 2019-01-24 PROCEDURE — 85027 COMPLETE CBC AUTOMATED: CPT | Performed by: FAMILY MEDICINE

## 2019-01-24 RX ORDER — IBUPROFEN 600 MG/1
600 TABLET, FILM COATED ORAL
COMMUNITY
Start: 2016-03-24 | End: 2021-12-16

## 2019-01-24 RX ORDER — PREDNISONE 20 MG/1
40 TABLET ORAL DAILY
Qty: 10 TABLET | Refills: 0 | Status: SHIPPED | OUTPATIENT
Start: 2019-01-24 | End: 2019-02-01

## 2019-01-24 RX ORDER — CALCIUM CARBONATE/VITAMIN D3 500-10/5ML
LIQUID (ML) ORAL
COMMUNITY

## 2019-01-24 ASSESSMENT — MIFFLIN-ST. JEOR: SCORE: 1965.39

## 2019-01-24 NOTE — PROGRESS NOTES
[facility]  [address] [place] [id number] [phone]  Dept: [phone]    PRE-OP EVALUATION:  Today's date: 2019    Guanako Padilla (: 1955) presents for pre-operative evaluation assessment as requested by Dr. Matthew Peters.  He requires evaluation and anesthesia risk assessment prior to undergoing surgery/procedure for treatment of R shoulder rotator cuff .    Fax number for surgical facility: [phone]  Primary Physician: Gricel oWo  Type of Anesthesia Anticipated: TO BE DETERMINED    Patient has a Health Care Directive or Living Will:  NO    Preop Questions 2019   1.  Do you have a history of Heart attack, stroke, stent, coronary bypass surgery, or other heart surgery? No   2.  Do you ever have any pain or discomfort in your chest? No   3.  Do you have a history of  Heart Failure? No   4.   Are you troubled by shortness of breath when:  walking on a level surface, or up a slight hill, or at night? No   5.  Do you currently have a cold, bronchitis or other respiratory infection? No   6.  Do you have a cough, shortness of breath, or wheezing? No   7.  Do you sometimes get pains in the calves of your legs when you walk? No   8. Do you or anyone in your family have previous history of blood clots? No   9.  Do you or does anyone in your family have a serious bleeding problem such as prolonged bleeding following surgeries or cuts? No   10. Have you ever had problems with anemia or been told to take iron pills? YES - hx of anemia in the past.   11. Have you had any abnormal blood loss such as black, tarry or bloody stools? No   12. Have you ever had a blood transfusion? No   13. Have you or any of your relatives ever had problems with anesthesia? No   14. Do you have sleep apnea, excessive snoring or daytime drowsiness? No   15. Do you have any prosthetic heart valves? No   16. Do you have prosthetic joints? No         HPI:     HPI related to upcoming  procedure: pt fell and has rotator cuff tear that requires surgical repair.      See problem list for active medical problems.  Problems all longstanding and stable, except as noted/documented.  See ROS for pertinent symptoms related to these conditions.                                                                                                                                                          .    MEDICAL HISTORY:     Patient Active Problem List    Diagnosis Date Noted     Arthritis of elbow 11/16/2018     Priority: Medium     Peptic ulcer 04/25/2016     Priority: Medium     Primary osteoarthritis of both knees 04/25/2016     Priority: Medium     Iron deficiency anemia due to chronic blood loss 04/25/2016     Priority: Medium     Pseudogout 07/28/2015     Priority: Medium     CARDIOVASCULAR SCREENING; LDL GOAL LESS THAN 160 10/31/2010     Priority: Medium     Internal derangement of shoulder 09/21/2010     Priority: Medium     Shoulder impingement syndrome 06/16/2010     Priority: Medium     Shoulder pain 01/25/2010     Priority: Medium     Disorder of 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 Provider to review        Past Medical History:   Diagnosis Date     Arthritis of elbow 11/16/2018     Broken foot     fall     Shingles      Past Surgical History:   Procedure Laterality Date     COLONOSCOPY  1/8/2016    Dr. Covarrubias [facility]     COLONOSCOPY  1/8/2016    Procedure: COLONOSCOPY;  Surgeon: Juan Covarrubias MD;  Location:  GI     ESOPHAGOSCOPY, GASTROSCOPY, DUODENOSCOPY (EGD), COMBINED  1/8/2016    Dr. Covarrubias [facility]     ESOPHAGOSCOPY, GASTROSCOPY, DUODENOSCOPY (EGD), COMBINED N/A 1/8/2016    Procedure: COMBINED ESOPHAGOSCOPY, GASTROSCOPY, DUODENOSCOPY (EGD), BIOPSY SINGLE OR MULTIPLE;  Surgeon: Juan Covarrubias MD;  Location:  GI     ORTHOPEDIC SURGERY Left 1976    left shoulder, rotator cuff repair     ORTHOPEDIC  "SURGERY Right 1980    wrist tendon repair     ORTHOPEDIC SURGERY Right 2011    right foot fx-metal in foot-ORIF     ORTHOPEDIC SURGERY Right 1981    Right elbow, dx \"[email or web address] league elbow\", it was cleaned out     ORTHOPEDIC SURGERY Right 2008    Right shoulder, rotator cuff repair     Current Outpatient Medications   Medication Sig Dispense Refill     amoxicillin-clavulanate (AUGMENTIN) 875-125 MG tablet Take 1 tablet by mouth 2 times daily for 10 days 20 tablet 0     Cholecalciferol (VITAMIN D3 PO) Take by mouth daily       MAGNESIUM OXIDE PO Take by mouth daily       Omega-3 Fatty Acids (FISH OIL) 1200 MG capsule        Pediatric Multivitamins-Iron (FLINTSTONES PLUS IRON PO)        OTC products: no recent use of OTC ASA, NSAIDS or Steroids    No Known Allergies   Latex Allergy: NO    Social History     Tobacco Use     Smoking status: Current Every Day Smoker     Smokeless tobacco: Never Used   Substance Use Topics     Alcohol use: Yes     Alcohol/week: 0.0 oz     Comment: 1 1/2 beers per day     History   Drug Use No       REVIEW OF SYSTEMS:   CONSTITUTIONAL: NEGATIVE for fever, chills, change in weight  INTEGUMENTARY/SKIN: NEGATIVE for worrisome rashes, moles or lesions  EYES: NEGATIVE for vision changes or irritation  ENT/MOUTH: NEGATIVE for ear, mouth and throat problems  RESP: NEGATIVE for significant cough or SOB  BREAST: NEGATIVE for masses, tenderness or discharge  CV: NEGATIVE for chest pain, palpitations or peripheral edema  GI: NEGATIVE for nausea, abdominal pain, heartburn, or change in bowel habits  : NEGATIVE for frequency, dysuria, or hematuria  MUSCULOSKELETAL:pt has been having pain and swelling on the Rt elbow, 1 week ago. After he fell on the rt shoulder,   NEURO: NEGATIVE for weakness, dizziness or paresthesias  ENDOCRINE: NEGATIVE for temperature intolerance, skin/hair changes  HEME: NEGATIVE for bleeding problems  PSYCHIATRIC: NEGATIVE for changes in mood or affect    EXAM:   There were no " vitals taken for this visit.    GENERAL APPEARANCE: healthy, alert and no distress     EYES: EOMI,  PERRL     HENT: ear canals and TM's normal and nose and mouth without ulcers or lesions     NECK: no adenopathy, no asymmetry, masses, or scars and thyroid normal to palpation     RESP: lungs clear to auscultation - no rales, rhonchi or wheezes     CV: regular rates and rhythm, normal S1 S2, no S3 or S4 and no murmur, click or rub     ABDOMEN:  soft, nontender, no HSM or masses and bowel sounds normal     MS: swelling, warmth and tenderness on both Rt wrist and Rt elbow, with decrease ROM of the joints due to pain and swelling.      SKIN: no suspicious lesions or rashes     NEURO: Normal strength and tone, sensory exam grossly normal, mentation intact and speech normal     PSYCH: mentation appears normal. and affect normal/bright     LYMPHATICS: No cervical adenopathy    DIAGNOSTICS:     Labs Resulted Today:   Results for orders placed or performed in visit on 01/24/19   CBC with platelets   Result Value Ref Range    WBC 6.9 4.0 - 11.0 10e9/L    RBC Count 4.39 (L) 4.4 - 5.9 10e12/L    Hemoglobin 12.6 (L) 13.3 - 17.7 g/dL    Hematocrit 39.5 (L) 40.0 - 53.0 %    MCV 90 78 - 100 fl    MCH 28.7 26.5 - 33.0 pg    MCHC 31.9 31.5 - 36.5 g/dL    RDW 14.1 10.0 - 15.0 %    Platelet Count 265 150 - 450 10e9/L     Labs Drawn and in Process:   Unresulted Labs Ordered in the Past 30 Days of this Admission     Date and Time Order Name Status Description    1/24/2019 1328 BASIC METABOLIC PANEL In process           Recent Labs   Lab Test 04/25/16  1404 09/19/15  0824 07/28/15  1518   HGB 13.3 13.1* 12.9*    202 279     --  139   POTASSIUM 4.4  --  4.3   CR 0.89  --  1.01   A1C  --  5.7  --         IMPRESSION:   Reason for surgery/procedure: rotator cuff repair.  Diagnosis/reason for consult: pre op    The proposed surgical procedure is considered INTERMEDIATE risk.    REVISED CARDIAC RISK INDEX  The patient has the  following serious cardiovascular risks for perioperative complications such as (MI, PE, VFib and 3  AV Block):  No serious cardiac risks  INTERPRETATION: 0 risks: Class I (very low risk - 0.4% complication rate)    The patient has the following additional risks for perioperative complications:  Pt is complaining of pain and swelling of the joints (Rt elbow and Rt wrist)       ICD-10-CM    1. Preop general physical exam Z01.818          (M75.111) Incomplete tear of right rotator cuff  Comment:   Plan:     (M25.421) Swelling of right elbow  Comment: with swelling of the wrist, I suspect inflammatory arthritis, mostly pseudogout given his hx of similar symptoms, given the severity of symptoms, will start on   Plan: predniSONE (DELTASONE) 20 MG tablet        Take 2 tabs for 5 days, follow up with Ortho in Am (pt does go to [facility])      RECOMMENDATIONS:       Anemia  Anemia and does not require treatment prior to surgery.  Monitor Hemoglobin postoperatively.          Surgery is NOT recommended due to active inflammation in both wrist and elbow, will start on prednisone 40 mg daily, as this can be related to pseudogout, (hx of pseudogout in the past) and will refer to Ortho in AM to evaluate and treat (pt does go to [facility])  Signed Electronically by: Gricel Woo MD    Copy of this evaluation report is provided to requesting physician.    [facility] Preop Guidelines    Revised Cardiac Risk Index    Addendum on 02/01/19  Pt is doing much better, his elbow has improved significantly since started on prednisone, he is off prednisone now, and is scheduled for his surgery (rotator cuff) and also orthoscopic surgery of the elbow.  Given his improvement, I think pt is stable and I agree to continue with the procedures.  Hold supplements at the day of the surgery, avoid Advil 3 to 4 days before surgery.

## 2019-01-25 ENCOUNTER — TELEPHONE (OUTPATIENT)
Dept: FAMILY MEDICINE | Facility: CLINIC | Age: 64
End: 2019-01-25

## 2019-01-25 DIAGNOSIS — D64.9 ANEMIA, UNSPECIFIED TYPE: Primary | ICD-10-CM

## 2019-01-25 LAB
ANION GAP SERPL CALCULATED.3IONS-SCNC: 8 MMOL/L (ref 3–14)
BUN SERPL-MCNC: 17 MG/DL (ref 7–30)
CALCIUM SERPL-MCNC: 8.8 MG/DL (ref 8.5–10.1)
CHLORIDE SERPL-SCNC: 104 MMOL/L (ref 94–109)
CO2 SERPL-SCNC: 26 MMOL/L (ref 20–32)
CREAT SERPL-MCNC: 0.88 MG/DL (ref 0.66–1.25)
GFR SERPL CREATININE-BSD FRML MDRD: >90 ML/MIN/{1.73_M2}
GLUCOSE SERPL-MCNC: 109 MG/DL (ref 70–99)
POTASSIUM SERPL-SCNC: 4.7 MMOL/L (ref 3.4–5.3)
SODIUM SERPL-SCNC: 138 MMOL/L (ref 133–144)

## 2019-01-25 NOTE — TELEPHONE ENCOUNTER
Labs are all within normal except for mild anemia, can repeat his blood test in 1 week?  Gricel Woo MD  [facility]  [phone]

## 2019-01-28 NOTE — TELEPHONE ENCOUNTER
AA - Elbow is doing ok. They are now going to be doing 2 surgeries - one on the right elbow for floating bodies and one on the right shoulder to repair the rotator cuff.  [facility] fax # [phone].  Surgery is on 2/7 - it was pushed out a little bit. Shari Schoenberger, CMA

## 2019-01-28 NOTE — TELEPHONE ENCOUNTER
Good, will it be possible to come and see me for follow up to make sure he is ok for surgery? Is that ok with him?  Gricel Woo MD  [facility]  [phone]

## 2019-02-01 ENCOUNTER — OFFICE VISIT (OUTPATIENT)
Dept: FAMILY MEDICINE | Facility: CLINIC | Age: 64
End: 2019-02-01
Payer: COMMERCIAL

## 2019-02-01 VITALS
RESPIRATION RATE: 20 BRPM | DIASTOLIC BLOOD PRESSURE: 69 MMHG | OXYGEN SATURATION: 98 % | HEART RATE: 89 BPM | TEMPERATURE: 98.4 F | WEIGHT: 237 LBS | HEIGHT: 75 IN | SYSTOLIC BLOOD PRESSURE: 110 MMHG | BODY MASS INDEX: 29.47 KG/M2

## 2019-02-01 DIAGNOSIS — D64.9 ANEMIA, UNSPECIFIED TYPE: ICD-10-CM

## 2019-02-01 DIAGNOSIS — M19.029 ARTHRITIS OF ELBOW: Primary | ICD-10-CM

## 2019-02-01 LAB
BASOPHILS # BLD AUTO: 0.1 10E9/L (ref 0–0.2)
BASOPHILS NFR BLD AUTO: 1 %
COPATH REPORT: NORMAL
DIFFERENTIAL METHOD BLD: NORMAL
EOSINOPHIL # BLD AUTO: 0.1 10E9/L (ref 0–0.7)
EOSINOPHIL NFR BLD AUTO: 1 %
ERYTHROCYTE [DISTWIDTH] IN BLOOD BY AUTOMATED COUNT: 14.5 % (ref 10–15)
FERRITIN SERPL-MCNC: 223 NG/ML (ref 26–388)
HCT VFR BLD AUTO: 41.1 % (ref 40–53)
HGB BLD-MCNC: 13.4 G/DL (ref 13.3–17.7)
IRON SATN MFR SERPL: 21 % (ref 15–46)
IRON SERPL-MCNC: 48 UG/DL (ref 35–180)
LYMPHOCYTES # BLD AUTO: 1.3 10E9/L (ref 0.8–5.3)
LYMPHOCYTES NFR BLD AUTO: 15 %
MCH RBC QN AUTO: 28.6 PG (ref 26.5–33)
MCHC RBC AUTO-ENTMCNC: 32.6 G/DL (ref 31.5–36.5)
MCV RBC AUTO: 88 FL (ref 78–100)
MONOCYTES # BLD AUTO: 0.8 10E9/L (ref 0–1.3)
MONOCYTES NFR BLD AUTO: 9 %
NEUTROPHILS # BLD AUTO: 6.2 10E9/L (ref 1.6–8.3)
NEUTROPHILS NFR BLD AUTO: 74 %
PLATELET # BLD AUTO: 338 10E9/L (ref 150–450)
RBC # BLD AUTO: 4.69 10E12/L (ref 4.4–5.9)
RETICS # AUTO: 80.6 10E9/L (ref 25–95)
RETICS/RBC NFR AUTO: 1.7 % (ref 0.5–2)
TIBC SERPL-MCNC: 228 UG/DL (ref 240–430)
TSH SERPL DL<=0.005 MIU/L-ACNC: 0.9 MU/L (ref 0.4–4)
WBC # BLD AUTO: 8.5 10E9/L (ref 4–11)

## 2019-02-01 PROCEDURE — 83550 IRON BINDING TEST: CPT | Performed by: FAMILY MEDICINE

## 2019-02-01 PROCEDURE — 36415 COLL VENOUS BLD VENIPUNCTURE: CPT | Performed by: FAMILY MEDICINE

## 2019-02-01 PROCEDURE — 99214 OFFICE O/P EST MOD 30 MIN: CPT | Performed by: FAMILY MEDICINE

## 2019-02-01 PROCEDURE — 82728 ASSAY OF FERRITIN: CPT | Performed by: FAMILY MEDICINE

## 2019-02-01 PROCEDURE — 84443 ASSAY THYROID STIM HORMONE: CPT | Performed by: FAMILY MEDICINE

## 2019-02-01 PROCEDURE — 85025 COMPLETE CBC W/AUTO DIFF WBC: CPT | Performed by: FAMILY MEDICINE

## 2019-02-01 PROCEDURE — 85045 AUTOMATED RETICULOCYTE COUNT: CPT | Performed by: FAMILY MEDICINE

## 2019-02-01 PROCEDURE — 83540 ASSAY OF IRON: CPT | Performed by: FAMILY MEDICINE

## 2019-02-01 ASSESSMENT — MIFFLIN-ST. JEOR: SCORE: 1942.71

## 2019-02-01 NOTE — PROGRESS NOTES
SUBJECTIVE:   Guanako Padilla is a 64 year old male who presents to clinic today for the following health issues:      Concern - right shoulder and blood draw  Onset: this is a f/u appt    Description:   hgb was low at last blood draw      Elbow problem/pain      Duration: 2 weeks ago.    Description  Location: Rt elbow    Intensity:  moderate    Accompanying signs and symptoms: swelling and redness that has improved since he took his prednisone.    History  Previous similar problem: YES  Previous evaluation:  Pt went to Orthopedic and was continued on prednisone, augmentin, and he felt much better, he is scheduled orthoscopic for elbow and rotator cuff surgery    Precipitating or alleviating factors:  Trauma or overuse: YES- over use   Aggravating factors include: any movement of the elbow.    Therapies tried and outcome: augmentin and prednisone.      Problem list and histories reviewed & adjusted, as indicated.  Additional history: also pt does have hx of anemia, it has been borderline for many years, and said that he has work up done, and they did not find anything.  Pt admitted that he used to give blood 2 to 3 years, and pt has been taking iron pill once daily.  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 Satish GARCIA     COLONOSCOPY  1/8/2016    Procedure: COLONOSCOPY;  Surgeon: Juan Covarrubias MD;  Location:  GI     ESOPHAGOSCOPY, GASTROSCOPY, DUODENOSCOPY (EGD), COMBINED  1/8/2016    Dr. Satish GOMEZ     ESOPHAGOSCOPY, GASTROSCOPY,  "DUODENOSCOPY (EGD), COMBINED N/A 1/8/2016    Procedure: COMBINED ESOPHAGOSCOPY, GASTROSCOPY, DUODENOSCOPY (EGD), BIOPSY SINGLE OR MULTIPLE;  Surgeon: Juan Covarrubias MD;  Location:  GI     ORTHOPEDIC SURGERY Left 1976    left shoulder, rotator cuff repair     ORTHOPEDIC SURGERY Right 1980    wrist tendon repair     ORTHOPEDIC SURGERY Right 2011    right foot fx-metal in foot-ORIF     ORTHOPEDIC SURGERY Right 1981    Right elbow, dx \"little league elbow\", it was cleaned out     ORTHOPEDIC SURGERY Right 2008    Right shoulder, rotator cuff repair       Social History     Tobacco Use     Smoking status: Current Every Day Smoker     Smokeless tobacco: Never Used   Substance Use Topics     Alcohol use: Yes     Alcohol/week: 0.0 oz     Comment: 1 1/2 beers per day     Family History   Problem Relation Age of Onset     Alzheimer Disease Mother         Dementia     Cerebrovascular Disease Father      Cancer Maternal Grandfather      Colon Cancer No family hx of          Current Outpatient Medications   Medication Sig Dispense Refill     Cholecalciferol (VITAMIN D3 PO) Take by mouth daily       ibuprofen (ADVIL/MOTRIN) 600 MG tablet Take 600 mg by mouth       magnesium oxide 400 MG CAPS        Omega-3 Fatty Acids (FISH OIL) 1200 MG capsule        Pediatric Multivitamins-Iron (FLINTSTONES PLUS IRON PO)        No Known Allergies  Recent Labs   Lab Test 01/24/19  1329 11/16/18  1120 04/25/16  1404 09/19/15  0824   A1C  --   --   --  5.7   LDL  --  112* 116*  --    HDL  --  74 82  --    TRIG  --  95 116  --    ALT  --   --  31  --    CR 0.88  --  0.89  --    GFRESTIMATED >90  --  87  --    GFRESTBLACK >90  --  >90  African American GFR Calc    --    POTASSIUM 4.7  --  4.4  --    TSH  --   --   --  0.71        Reviewed and updated as needed this visit by clinical staff  Tobacco  Allergies  Meds  Med Hx  Surg Hx  Fam Hx  Soc Hx      Reviewed and updated as needed this visit by Provider         ROS:  CONSTITUTIONAL: " "NEGATIVE for fever, chills, change in weight  RESP: NEGATIVE for significant cough or SOB  CV: NEGATIVE for chest pain, palpitations or peripheral edema    OBJECTIVE:     /69 (BP Location: Right arm, Patient Position: Chair, Cuff Size: Adult Large)   Pulse 89   Temp 98.4  F (36.9  C) (Oral)   Resp 20   Ht 1.892 m (6' 2.5\")   Wt 107.5 kg (237 lb)   SpO2 98%   BMI 30.02 kg/m    Body mass index is 30.02 kg/m .  GENERAL: healthy, alert and no distress  RESP: lungs clear to auscultation - no rales, rhonchi or wheezes  CV: regular rate and rhythm, normal S1 S2, no S3 or S4, no murmur, click or rub, no peripheral edema and peripheral pulses strong  ABDOMEN: soft, nontender, no hepatosplenomegaly, no masses and bowel sounds normal  MS: swelling of the Rt elbow with mild erythema, much improved from last visit, with limited ROM of the elbow that is not painful.        ASSESSMENT/PLAN:             1. Anemia, unspecified type  Chronic, will start by ordering the following tests  - TSH with free T4 reflex  - **Iron and iron binding capacity FUTURE anytime  - Ferritin  - Reticulocyte Count  - CBC with platelets differential  - Blood Morphology Pathologist Review    2. Arthritis of elbow  Improving, pt has stopped prednisone, as his symptoms improve, I think it is safe to proceed with surgery, will update my pre op note from 1/24    Follow up with orthopedic      Gricel Woo MD  [facility]    "

## 2019-04-08 ENCOUNTER — THERAPY VISIT (OUTPATIENT)
Dept: PHYSICAL THERAPY | Facility: CLINIC | Age: 64
End: 2019-04-08
Payer: COMMERCIAL

## 2019-04-08 DIAGNOSIS — Z47.89 AFTERCARE FOLLOWING SURGERY OF THE MUSCULOSKELETAL SYSTEM: ICD-10-CM

## 2019-04-08 DIAGNOSIS — M75.101 TEAR OF RIGHT ROTATOR CUFF: ICD-10-CM

## 2019-04-08 PROCEDURE — 97110 THERAPEUTIC EXERCISES: CPT | Mod: GP | Performed by: PHYSICAL THERAPIST

## 2019-04-08 PROCEDURE — 97161 PT EVAL LOW COMPLEX 20 MIN: CPT | Mod: GP | Performed by: PHYSICAL THERAPIST

## 2019-04-08 NOTE — PROGRESS NOTES
[facility] Initial Evaluation  Subjective:  Pt presents to PT s/p right shoulder scope RCR revision with debridement.  DOS is 2/7/19.  Injured the right shoulder and elbow in mid January 2019 when he fell while playing hockey.  Hx of right RCR in 2009 and left shoulder dislocation surgery in 1977.  Was in a sling for 6 weeks.                          Objective:  System                   Shoulder Evaluation:  ROM:  AROM:    Flexion:  Left:  123    Right:  80  Extension: Left: 48Right: 44  Abduction:  Left: 120   Right:  76            Elbow Flexion:  Left:  128    Right:  121  Elbow Extension:  Left:  27   Right:  31    Extension/Internal Rotation:  Left:  T9    Right:  Gluteal cleft    PROM:    Flexion:  Left:  135    Right: 122      Abduction:  Left:  110    Right:  103    Internal Rotation:  Left:  37    Right:  43  External Rotation:  Left:  28    Right:  54                    Strength:  not assessed                                                               General     ROS    Assessment/Plan:    Patient is a 64 year old male with right side shoulder complaints.    Patient has the following significant findings with corresponding treatment plan.                Diagnosis 1:  S/p right shoulder RCR with debridement  Pain -  hot/cold therapy and education  Decreased ROM/flexibility - manual therapy, therapeutic exercise and home program  Decreased strength - therapeutic exercise, therapeutic activities and home program    Therapy Evaluation Codes:   1) History comprised of:   Personal factors that impact the plan of care:      None.    Comorbidity factors that impact the plan of care are:      None.     Medications impacting care: None.  2) Examination of Body Systems comprised of:   Body structures and functions that impact the plan of care:      Shoulder.   Activity limitations that impact the plan of care are:      Bathing, Driving, Dressing and Lifting.  3) Clinical presentation  characteristics are:   Stable/Uncomplicated.  4) Decision-Making    Low complexity using standardized patient assessment instrument and/or measureable assessment of functional outcome.  Cumulative Therapy Evaluation is: Low complexity.    Previous and current functional limitations:  (See Goal Flow Sheet for this information)    Short term and Long term goals: (See Goal Flow Sheet for this information)     Communication ability:  Patient appears to be able to clearly communicate and understand verbal and written communication and follow directions correctly.  Treatment Explanation - The following has been discussed with the patient:   RX ordered/plan of care  Anticipated outcomes  Possible risks and side effects  This patient would benefit from PT intervention to resume normal activities.   Rehab potential is good.    Frequency:  2 X week, once daily  Duration:  for 4 weeks tapering to 1 X a week over 4 weeks  Discharge Plan:  Achieve all LTG.  Independent in home treatment program.  Reach maximal therapeutic benefit.    Please refer to the daily flowsheet for treatment today, total treatment time and time spent performing 1:1 timed codes.

## 2019-04-08 NOTE — LETTER
[facility]  [address]  [facility] [address]  [phone]    2019    Re: Guanako Padilla   :   1955  MRN:  [id number]   REFERRING PHYSICIAN:   Troy Sharp    [facility]  Date of Initial Evaluation:  2019  Visits:  Rxs Used: 1  Reason for Referral:     Tear of right rotator cuff  Aftercare following surgery of the musculoskeletal system    EVALUATION SUMMARY  [facility] Initial Evaluation  Subjective:  Pt presents to PT s/p right shoulder scope RCR revision with debridement.  DOS is 19.  Injured the right shoulder and elbow in mid 2019 when he fell while playing hockey.  Hx of right RCR in  and left shoulder dislocation surgery in .  Was in a sling for 6 weeks.  Other surgeries include:  Orthopedic surgery.  Current medications:  Anti-inflammatory.  Current occupation is retired.    Primary job tasks include:  Lifting and driving (carrying, pushing, pulling).    Objective:  Shoulder Evaluation:  ROM:  AROM:    Flexion:  Left:  123    Right:  80  Extension: Left: 48Right: 44  Abduction:  Left: 120   Right:  76  Elbow Flexion:  Left:  128    Right:  121  Elbow Extension:  Left:  27   Right:  31  Extension/Internal Rotation:  Left:  T9    Right:  Gluteal cleft      PROM:    Flexion:  Left:  135    Right: 122    Abduction:  Left:  110    Right:  103  Internal Rotation:  Left:  37    Right:  43  External Rotation:  Left:  28    Right:  54    Strength:  not assessed    Assessment/Plan:    Patient is a 64 year old male with right side shoulder complaints.    Patient has the following significant findings with corresponding treatment plan.                Diagnosis 1:  S/p right shoulder RCR with debridement  Pain -  hot/cold therapy and education  Decreased ROM/flexibility - manual therapy, therapeutic exercise and home program  Decreased strength - therapeutic exercise, therapeutic  activities and home program  Re: Guanako Padilla   :   1955      Therapy Evaluation Codes:   1) History comprised of:   Personal factors that impact the plan of care:      None.    Comorbidity factors that impact the plan of care are:      None.     Medications impacting care: None.  2) Examination of Body Systems comprised of:   Body structures and functions that impact the plan of care:      Shoulder.   Activity limitations that impact the plan of care are:      Bathing, Driving, Dressing and Lifting.  3) Clinical presentation characteristics are:   Stable/Uncomplicated.  4) Decision-Making    Low complexity using standardized patient assessment instrument and/or measureable assessment of functional outcome.  Cumulative Therapy Evaluation is: Low complexity.    Previous and current functional limitations:  (See Goal Flow Sheet for this information)    Short term and Long term goals: (See Goal Flow Sheet for this information)     Communication ability:  Patient appears to be able to clearly communicate and understand verbal and written communication and follow directions correctly.  Treatment Explanation - The following has been discussed with the patient:   RX ordered/plan of care  Anticipated outcomes  Possible risks and side effects  This patient would benefit from PT intervention to resume normal activities.   Rehab potential is good.    Frequency:  2 X week, once daily  Duration:  for 4 weeks tapering to 1 X a week over 4 weeks  Discharge Plan:  Achieve all LTG.  Independent in home treatment program.  Reach maximal therapeutic benefit.    Thank you for your referral.    INQUIRIES  Therapist: Russ Mclain, PT   [facility] [place]  [address]  [facility] [address]  Phone: [phone]  Fax: [phone]

## 2019-04-16 ENCOUNTER — THERAPY VISIT (OUTPATIENT)
Dept: PHYSICAL THERAPY | Facility: CLINIC | Age: 64
End: 2019-04-16
Payer: COMMERCIAL

## 2019-04-16 DIAGNOSIS — Z47.89 AFTERCARE FOLLOWING SURGERY OF THE MUSCULOSKELETAL SYSTEM: ICD-10-CM

## 2019-04-16 DIAGNOSIS — M75.101 TEAR OF RIGHT ROTATOR CUFF: ICD-10-CM

## 2019-04-16 PROCEDURE — 97010 HOT OR COLD PACKS THERAPY: CPT | Mod: GP | Performed by: PHYSICAL THERAPIST

## 2019-04-16 PROCEDURE — 97110 THERAPEUTIC EXERCISES: CPT | Mod: GP | Performed by: PHYSICAL THERAPIST

## 2019-04-25 ENCOUNTER — THERAPY VISIT (OUTPATIENT)
Dept: PHYSICAL THERAPY | Facility: CLINIC | Age: 64
End: 2019-04-25
Payer: COMMERCIAL

## 2019-04-25 DIAGNOSIS — M75.101 TEAR OF RIGHT ROTATOR CUFF: ICD-10-CM

## 2019-04-25 DIAGNOSIS — Z47.89 AFTERCARE FOLLOWING SURGERY OF THE MUSCULOSKELETAL SYSTEM: ICD-10-CM

## 2019-04-25 PROCEDURE — 97110 THERAPEUTIC EXERCISES: CPT | Mod: GP | Performed by: PHYSICAL THERAPIST

## 2019-04-25 PROCEDURE — 97010 HOT OR COLD PACKS THERAPY: CPT | Mod: GP | Performed by: PHYSICAL THERAPIST

## 2019-04-25 NOTE — PROGRESS NOTES
Subjective:  HPI                    Objective:  System    Physical Exam    General     ROS    Assessment/Plan:    PROGRESS  REPORT    Progress reporting period is from 4/8/19 to 4/25/19 (3 visits).       SUBJECTIVE  Subjective changes noted by patient:   Subjective: Galen reports improving ROM and weakness of his shoulder.  Compliant with HEP.     Current pain level is 2/10  .     Previous pain level was  NA  .   Changes in function:  Yes (See Goal flowsheet attached for changes in current functional level)  Adverse reaction to treatment or activity: None    OBJECTIVE  Changes noted in objective findings:    Objective: AROM: Flex=108, Abd=104.  PROM: ER=72, Abd=115, Flex=139, IR=43.     ASSESSMENT/PLAN  Updated problem list and treatment plan: Diagnosis 1:  S/p right shoulder RCR revision    STG/LTGs have been met or progress has been made towards goals:  Yes (See Goal flow sheet completed today.)  Assessment of Progress: The patient's condition is improving.  Self Management Plans:  Patient has been instructed in a home treatment program.    Guanako continues to require the following intervention to meet STG and LTG's:  PT    Recommendations:  This patient would benefit from continued therapy.     Frequency:  1 X week, once daily  Duration:  for 6 weeks        Please refer to the daily flowsheet for treatment today, total treatment time and time spent performing 1:1 timed codes.

## 2019-05-01 ENCOUNTER — THERAPY VISIT (OUTPATIENT)
Dept: PHYSICAL THERAPY | Facility: CLINIC | Age: 64
End: 2019-05-01
Payer: COMMERCIAL

## 2019-05-01 DIAGNOSIS — M75.101 TEAR OF RIGHT ROTATOR CUFF: ICD-10-CM

## 2019-05-01 DIAGNOSIS — Z47.89 AFTERCARE FOLLOWING SURGERY OF THE MUSCULOSKELETAL SYSTEM: ICD-10-CM

## 2019-05-01 PROCEDURE — 97110 THERAPEUTIC EXERCISES: CPT | Mod: GP | Performed by: PHYSICAL THERAPIST

## 2019-05-01 PROCEDURE — 97010 HOT OR COLD PACKS THERAPY: CPT | Mod: GP | Performed by: PHYSICAL THERAPIST

## 2019-05-08 ENCOUNTER — THERAPY VISIT (OUTPATIENT)
Dept: PHYSICAL THERAPY | Facility: CLINIC | Age: 64
End: 2019-05-08
Payer: COMMERCIAL

## 2019-05-08 DIAGNOSIS — Z47.89 AFTERCARE FOLLOWING SURGERY OF THE MUSCULOSKELETAL SYSTEM: ICD-10-CM

## 2019-05-08 DIAGNOSIS — M75.101 TEAR OF RIGHT ROTATOR CUFF: ICD-10-CM

## 2019-05-08 PROCEDURE — 97110 THERAPEUTIC EXERCISES: CPT | Mod: GP | Performed by: PHYSICAL THERAPIST

## 2019-05-08 PROCEDURE — 97010 HOT OR COLD PACKS THERAPY: CPT | Mod: GP | Performed by: PHYSICAL THERAPIST

## 2019-05-15 ENCOUNTER — THERAPY VISIT (OUTPATIENT)
Dept: PHYSICAL THERAPY | Facility: CLINIC | Age: 64
End: 2019-05-15
Payer: COMMERCIAL

## 2019-05-15 DIAGNOSIS — Z47.89 AFTERCARE FOLLOWING SURGERY OF THE MUSCULOSKELETAL SYSTEM: ICD-10-CM

## 2019-05-15 DIAGNOSIS — M75.101 TEAR OF RIGHT ROTATOR CUFF: ICD-10-CM

## 2019-05-15 PROCEDURE — 97110 THERAPEUTIC EXERCISES: CPT | Mod: GP | Performed by: PHYSICAL THERAPIST

## 2019-05-15 PROCEDURE — 97010 HOT OR COLD PACKS THERAPY: CPT | Mod: GP | Performed by: PHYSICAL THERAPIST

## 2019-05-22 ENCOUNTER — THERAPY VISIT (OUTPATIENT)
Dept: PHYSICAL THERAPY | Facility: CLINIC | Age: 64
End: 2019-05-22
Payer: COMMERCIAL

## 2019-05-22 DIAGNOSIS — Z47.89 AFTERCARE FOLLOWING SURGERY OF THE MUSCULOSKELETAL SYSTEM: ICD-10-CM

## 2019-05-22 DIAGNOSIS — M75.101 TEAR OF RIGHT ROTATOR CUFF: ICD-10-CM

## 2019-05-22 PROCEDURE — 97110 THERAPEUTIC EXERCISES: CPT | Mod: GP | Performed by: PHYSICAL THERAPIST

## 2019-05-22 PROCEDURE — 97010 HOT OR COLD PACKS THERAPY: CPT | Mod: GP | Performed by: PHYSICAL THERAPIST

## 2019-05-29 ENCOUNTER — THERAPY VISIT (OUTPATIENT)
Dept: PHYSICAL THERAPY | Facility: CLINIC | Age: 64
End: 2019-05-29
Payer: COMMERCIAL

## 2019-05-29 DIAGNOSIS — M75.101 TEAR OF RIGHT ROTATOR CUFF: ICD-10-CM

## 2019-05-29 DIAGNOSIS — Z47.89 AFTERCARE FOLLOWING SURGERY OF THE MUSCULOSKELETAL SYSTEM: ICD-10-CM

## 2019-05-29 PROCEDURE — 97010 HOT OR COLD PACKS THERAPY: CPT | Mod: GP | Performed by: PHYSICAL THERAPIST

## 2019-05-29 PROCEDURE — 97110 THERAPEUTIC EXERCISES: CPT | Mod: GP | Performed by: PHYSICAL THERAPIST

## 2019-06-05 ENCOUNTER — THERAPY VISIT (OUTPATIENT)
Dept: PHYSICAL THERAPY | Facility: CLINIC | Age: 64
End: 2019-06-05
Payer: COMMERCIAL

## 2019-06-05 DIAGNOSIS — Z47.89 AFTERCARE FOLLOWING SURGERY OF THE MUSCULOSKELETAL SYSTEM: ICD-10-CM

## 2019-06-05 DIAGNOSIS — M75.101 TEAR OF RIGHT ROTATOR CUFF: ICD-10-CM

## 2019-06-05 PROCEDURE — 97010 HOT OR COLD PACKS THERAPY: CPT | Mod: GP | Performed by: PHYSICAL THERAPIST

## 2019-06-05 PROCEDURE — 97110 THERAPEUTIC EXERCISES: CPT | Mod: GP | Performed by: PHYSICAL THERAPIST

## 2019-06-17 ENCOUNTER — THERAPY VISIT (OUTPATIENT)
Dept: PHYSICAL THERAPY | Facility: CLINIC | Age: 64
End: 2019-06-17
Payer: COMMERCIAL

## 2019-06-17 DIAGNOSIS — Z47.89 AFTERCARE FOLLOWING SURGERY OF THE MUSCULOSKELETAL SYSTEM: ICD-10-CM

## 2019-06-17 DIAGNOSIS — M75.101 TEAR OF RIGHT ROTATOR CUFF: ICD-10-CM

## 2019-06-17 PROCEDURE — 97110 THERAPEUTIC EXERCISES: CPT | Mod: GP

## 2019-06-17 PROCEDURE — 97010 HOT OR COLD PACKS THERAPY: CPT | Mod: GP

## 2019-07-01 ENCOUNTER — THERAPY VISIT (OUTPATIENT)
Dept: PHYSICAL THERAPY | Facility: CLINIC | Age: 64
End: 2019-07-01
Payer: COMMERCIAL

## 2019-07-01 DIAGNOSIS — M75.101 TEAR OF RIGHT ROTATOR CUFF: ICD-10-CM

## 2019-07-01 DIAGNOSIS — Z47.89 AFTERCARE FOLLOWING SURGERY OF THE MUSCULOSKELETAL SYSTEM: ICD-10-CM

## 2019-07-01 PROCEDURE — 97010 HOT OR COLD PACKS THERAPY: CPT | Mod: GP | Performed by: PHYSICAL THERAPIST

## 2019-07-01 PROCEDURE — 97110 THERAPEUTIC EXERCISES: CPT | Mod: GP | Performed by: PHYSICAL THERAPIST

## 2019-08-13 PROBLEM — M75.101 TEAR OF RIGHT ROTATOR CUFF: Status: RESOLVED | Noted: 2019-04-08 | Resolved: 2019-08-13

## 2019-08-13 PROBLEM — Z47.89 AFTERCARE FOLLOWING SURGERY OF THE MUSCULOSKELETAL SYSTEM: Status: RESOLVED | Noted: 2019-04-08 | Resolved: 2019-08-13

## 2019-08-13 NOTE — PROGRESS NOTES
Discharge Note        Guanako failed to follow up and current status is unknown.  Please see information below for last relevant information on current status.  Patient seen for 8 visits.    SUBJECTIVE  Subjective changes noted by patient:  Still weak overhead, but overall improving and doing well.    .  Current pain level is  .     Previous pain level was   .   Changes in function:  Yes (See Goal flowsheet attached for changes in current functional level)  Adverse reaction to treatment or activity: None    OBJECTIVE  Changes noted in objective findings: AROM: Flex=135 , Abd=129 , IR/ext=L3.     ASSESSMENT/PLAN  Diagnosis: s/p right schoulder scope RCR revsion with debridement   Updated problem list and treatment plan:   Decreased ROM/flexibility - HEP  Decreased strength - HEP  STG/LTGs have been met or progress has been made towards goals:  Yes, please see goal flowsheet for most current information  Assessment of Progress: current status is unknown.    Last current status: Pt is progressing well   Self Management Plans:  HEP  I have re-evaluated this patient and find that the nature, scope, duration and intensity of the therapy is appropriate for the medical condition of the patient.  Guanako continues to require the following intervention to meet STG and LTG's:  HEP.    Recommendations:  Discharge with current home program.  Patient to follow up with MD as needed.    Please refer to the daily flowsheet for treatment today, total treatment time and time spent performing 1:1 timed codes.

## 2019-09-01 NOTE — LETTER
[facility]  [address] [facility] [address] [phone]    2019    Re: Guanako Padilla   :   1955  MRN:  [id number]   REFERRING PHYSICIAN:   Troy Sharp    [facility]  Date of Initial Evaluation:  2019  Visits:  Rxs Used: 3  Reason for Referral:     Tear of right rotator cuff  Aftercare following surgery of the musculoskeletal system      PROGRESS  REPORT  Progress reporting period is from 19 to 19 (3 visits).       SUBJECTIVE  Subjective changes noted by patient:   Subjective: Galen reports improving ROM and weakness of his shoulder.  Compliant with HEP.     Current pain level is 2/10  .     Previous pain level was  NA  .   Changes in function:  Yes (See Goal flowsheet attached for changes in current functional level)  Adverse reaction to treatment or activity: None    OBJECTIVE  Changes noted in objective findings:    Objective: AROM: Flex=108, Abd=104.  PROM: ER=72, Abd=115, Flex=139, IR=43.     ASSESSMENT/PLAN  Updated problem list and treatment plan: Diagnosis 1:  S/p right shoulder RCR revision    STG/LTGs have been met or progress has been made towards goals:  Yes (See Goal flow sheet completed today.)  Assessment of Progress: The patient's condition is improving.  Self Management Plans:  Patient has been instructed in a home treatment program.  Guanako continues to require the following intervention to meet STG and LTG's:  PT    Recommendations:  This patient would benefit from continued therapy.     Frequency:  1 X week, once daily  Duration:  for 6 weeks                Re: Guanako Padilla   :   1955                              Thank you for your referral.    INQUIRIES  Therapist: Russ Mclain, PT   [facility]  [address] [facility] [address]  Phone: [phone]  Fax: [phone]     
None

## 2019-09-29 ENCOUNTER — HEALTH MAINTENANCE LETTER (OUTPATIENT)
Age: 64
End: 2019-09-29

## 2019-11-13 ENCOUNTER — HOSPITAL ENCOUNTER (EMERGENCY)
Facility: OTHER | Age: 64
Discharge: HOME | End: 2019-11-13
Attending: FAMILY MEDICINE | Admitting: FAMILY MEDICINE
Payer: COMMERCIAL

## 2019-11-13 ENCOUNTER — APPOINTMENT (OUTPATIENT)
Dept: GENERAL RADIOLOGY | Facility: OTHER | Age: 64
End: 2019-11-13
Attending: FAMILY MEDICINE
Payer: COMMERCIAL

## 2019-11-13 VITALS
BODY MASS INDEX: 30.8 KG/M2 | TEMPERATURE: 96.7 F | HEART RATE: 64 BPM | RESPIRATION RATE: 16 BRPM | DIASTOLIC BLOOD PRESSURE: 91 MMHG | SYSTOLIC BLOOD PRESSURE: 133 MMHG | WEIGHT: 240 LBS | HEIGHT: 74 IN | OXYGEN SATURATION: 98 %

## 2019-11-13 DIAGNOSIS — M65.30 TRIGGER FINGER, ACQUIRED: ICD-10-CM

## 2019-11-13 PROCEDURE — 73130 X-RAY EXAM OF HAND: CPT | Mod: LT

## 2019-11-13 PROCEDURE — 99283 EMERGENCY DEPT VISIT LOW MDM: CPT | Performed by: FAMILY MEDICINE

## 2019-11-13 PROCEDURE — 25000132 ZZH RX MED GY IP 250 OP 250 PS 637: Performed by: FAMILY MEDICINE

## 2019-11-13 PROCEDURE — 99283 EMERGENCY DEPT VISIT LOW MDM: CPT | Mod: Z6 | Performed by: FAMILY MEDICINE

## 2019-11-13 RX ORDER — IBUPROFEN 400 MG/1
800 TABLET, FILM COATED ORAL ONCE
Status: COMPLETED | OUTPATIENT
Start: 2019-11-13 | End: 2019-11-13

## 2019-11-13 RX ADMIN — IBUPROFEN 800 MG: 400 TABLET ORAL at 06:00

## 2019-11-13 ASSESSMENT — ENCOUNTER SYMPTOMS
SORE THROAT: 0
COLOR CHANGE: 0
JOINT SWELLING: 0
SHORTNESS OF BREATH: 0
ABDOMINAL PAIN: 0
NUMBNESS: 0
FEVER: 0
ARTHRALGIAS: 1
WEAKNESS: 0

## 2019-11-13 ASSESSMENT — MIFFLIN-ST. JEOR: SCORE: 1948.38

## 2019-11-13 NOTE — DISCHARGE INSTRUCTIONS
Be Profen 800 mg every 8 hours plus or minus Tylenol.  Continue splint until seen by orthopedic doctor.

## 2019-11-13 NOTE — ED PROVIDER NOTES
History     Chief Complaint   Patient presents with     Finger     HPI  Guanako Padilla is a 64 year old male who presents to ED with left 3rd finger pain. He states he just woke up this morning with his finger bent and very painful. He states it feels very tight and that he is unable to move it.  He states the pain is dull and aching, 8 out of 10 for severity with any attempt of movement or touch.  He denies any loss of sensation.  He denies any inciting event or injury.  He has no further questions or complaints today.    Allergies:  No Known Allergies    Problem List:    Patient Active Problem List    Diagnosis Date Noted     Incomplete tear of right rotator cuff 01/24/2019     Priority: Medium     Arthritis of elbow 11/16/2018     Priority: Medium     Peptic ulcer 04/25/2016     Priority: Medium     Primary osteoarthritis of both knees 04/25/2016     Priority: Medium     Iron deficiency anemia due to chronic blood loss 04/25/2016     Priority: Medium     Pseudogout 07/28/2015     Priority: Medium     CARDIOVASCULAR SCREENING; LDL GOAL LESS THAN 160 10/31/2010     Priority: Medium     Internal derangement of shoulder 09/21/2010     Priority: Medium     Shoulder impingement syndrome 06/16/2010     Priority: Medium     Shoulder pain 01/25/2010     Priority: Medium     Disorder of 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 Provider to review          Past Medical History:    Past Medical History:   Diagnosis Date     Arthritis of elbow 11/16/2018     Broken foot      Shingles        Past Surgical History:    Past Surgical History:   Procedure Laterality Date     COLONOSCOPY  1/8/2016    Dr. Satish GARCIA     COLONOSCOPY  1/8/2016    Procedure: COLONOSCOPY;  Surgeon: Juan Covarrubias MD;  Location:  GI     ESOPHAGOSCOPY, GASTROSCOPY, DUODENOSCOPY (EGD), COMBINED  1/8/2016    Dr. Satish GARCIA     ESOPHAGOSCOPY, GASTROSCOPY, DUODENOSCOPY  "(EGD), COMBINED N/A 1/8/2016    Procedure: COMBINED ESOPHAGOSCOPY, GASTROSCOPY, DUODENOSCOPY (EGD), BIOPSY SINGLE OR MULTIPLE;  Surgeon: Juan Covarrubias MD;  Location:  GI     ORTHOPEDIC SURGERY Left 1976    left shoulder, rotator cuff repair     ORTHOPEDIC SURGERY Right 1980    wrist tendon repair     ORTHOPEDIC SURGERY Right 2011    right foot fx-metal in foot-ORIF     ORTHOPEDIC SURGERY Right 1981    Right elbow, dx \"little league elbow\", it was cleaned out     ORTHOPEDIC SURGERY Right 2008    Right shoulder, rotator cuff repair       Family History:    Family History   Problem Relation Age of Onset     Alzheimer Disease Mother         Dementia     Cerebrovascular Disease Father      Cancer Maternal Grandfather      Colon Cancer No family hx of        Social History:  Marital Status:   [2]  Social History     Tobacco Use     Smoking status: Current Every Day Smoker     Smokeless tobacco: Never Used   Substance Use Topics     Alcohol use: Yes     Alcohol/week: 0.0 standard drinks     Comment: 1 1/2 beers per day     Drug use: No        Medications:    Cholecalciferol (VITAMIN D3 PO)  ibuprofen (ADVIL/MOTRIN) 600 MG tablet  magnesium oxide 400 MG CAPS  Omega-3 Fatty Acids (FISH OIL) 1200 MG capsule  Pediatric Multivitamins-Iron (FLINTSTONES PLUS IRON PO)          Review of Systems   Constitutional: Negative for fever.   HENT: Negative for sore throat.    Respiratory: Negative for shortness of breath.    Cardiovascular: Negative for chest pain.   Gastrointestinal: Negative for abdominal pain.   Musculoskeletal: Positive for arthralgias. Negative for joint swelling.   Skin: Negative for color change and pallor.   Neurological: Negative for weakness and numbness.   All other systems reviewed and are negative.      Physical Exam   BP: (!) 133/91  Pulse: 64  Temp: 96.7  F (35.9  C)  Resp: 16  Height: 188 cm (6' 2\")  Weight: 108.9 kg (240 lb)  SpO2: 98 %      Physical Exam  Vitals signs and nursing note " reviewed.   Constitutional:       General: He is not in acute distress.     Appearance: He is well-developed. He is not diaphoretic.   HENT:      Head: Normocephalic and atraumatic.      Right Ear: External ear normal.      Left Ear: External ear normal.      Nose: Nose normal.      Mouth/Throat:      Lips: Pink.      Mouth: Mucous membranes are moist.      Pharynx: Oropharynx is clear.   Eyes:      Extraocular Movements: Extraocular movements intact.      Conjunctiva/sclera: Conjunctivae normal.      Pupils: Pupils are equal, round, and reactive to light.   Neck:      Musculoskeletal: Full passive range of motion without pain, normal range of motion and neck supple.      Thyroid: No thyromegaly.   Cardiovascular:      Rate and Rhythm: Normal rate and regular rhythm.      Pulses: Normal pulses.      Heart sounds: Normal heart sounds, S1 normal and S2 normal. No murmur.   Pulmonary:      Effort: Pulmonary effort is normal.      Breath sounds: Normal breath sounds. No decreased breath sounds.   Abdominal:      General: Bowel sounds are normal.      Palpations: Abdomen is soft.   Musculoskeletal:      Left hand: He exhibits decreased range of motion and tenderness. He exhibits no bony tenderness, normal two-point discrimination, normal capillary refill, no laceration and no swelling.        Hands:    Lymphadenopathy:      Cervical: No cervical adenopathy.   Skin:     General: Skin is warm.      Capillary Refill: Capillary refill takes less than 2 seconds.   Neurological:      Mental Status: He is alert and oriented to person, place, and time.      Sensory: Sensation is intact.      Motor: Motor function is intact.   Psychiatric:         Mood and Affect: Mood normal.         Behavior: Behavior normal. Behavior is cooperative.         ED Course     Procedures     Critical Care time:  none  No results found for this or any previous visit (from the past 24 hour(s)).    Medications   ibuprofen (ADVIL/MOTRIN) tablet 800 mg  (800 mg Oral Given 11/13/19 0600)       Assessments & Plan (with Medical Decision Making)     I have reviewed the nursing notes.    I was able to extend the finger however whenever he flexes the fingers the middle finger gets stuck in the flexed position from the PIP joint.  It is extendable repetitively.  He does have some discomfort when doing this.    X-ray of the left hand is negative for acute findings.    The patient is given an aluminum foam splint that was placed by nursing to keep the finger in extension.  His orthopedic surgeon is in the [facility] area and he will need to follow-up there.    Patient is discharged home in good condition.  Continue splint until seen by orthopedic surgery.  Ibuprofen and Tylenol for pain.    I have reviewed the findings, diagnosis, plan and need for follow up with the patient.    New Prescriptions    No medications on file       Final diagnoses:   Trigger finger, acquired       11/13/2019   [facility]     Phillip Romero MD  11/13/19 1457

## 2019-11-13 NOTE — ED AVS SNAPSHOT
[facility]  [address]  [place] [address]  Phone:  [phone]  Fax:  [phone]                                    Guanako Padilla   MRN: [id number]    Department:  [facility]   Date of Visit:  11/13/2019           After Visit Summary Signature Page    I have received my discharge instructions, and my questions have been answered. I have discussed any challenges I see with this plan with the nurse or doctor.    ..........................................................................................................................................  Patient/Patient Representative Signature      ..........................................................................................................................................  Patient Representative Print Name and Relationship to Patient    ..................................................               ................................................  Date                                   Time    ..........................................................................................................................................  Reviewed by Signature/Title    ...................................................              ..............................................  Date                                               Time          22EPIC Rev 08/18

## 2019-11-13 NOTE — ED TRIAGE NOTES
Patient woke up with middle finger on L hand stuck in the bent position. Unable to move finger, states finger feels tight and pain with trying to move it. Denies any recent injury to that hand. Krysten Mejia RN on 11/13/2019 at 5:51 AM

## 2020-02-10 ENCOUNTER — TRANSFERRED RECORDS (OUTPATIENT)
Dept: HEALTH INFORMATION MANAGEMENT | Facility: CLINIC | Age: 65
End: 2020-02-10

## 2020-03-06 ENCOUNTER — OFFICE VISIT (OUTPATIENT)
Dept: FAMILY MEDICINE | Facility: CLINIC | Age: 65
End: 2020-03-06
Payer: COMMERCIAL

## 2020-03-06 VITALS
TEMPERATURE: 98.6 F | DIASTOLIC BLOOD PRESSURE: 76 MMHG | HEIGHT: 74 IN | OXYGEN SATURATION: 97 % | SYSTOLIC BLOOD PRESSURE: 130 MMHG | HEART RATE: 69 BPM | BODY MASS INDEX: 33.45 KG/M2 | WEIGHT: 260.6 LBS | RESPIRATION RATE: 18 BRPM

## 2020-03-06 DIAGNOSIS — Z87.11 HISTORY OF GASTRIC ULCER: Primary | ICD-10-CM

## 2020-03-06 DIAGNOSIS — R10.13 ABDOMINAL PAIN, EPIGASTRIC: ICD-10-CM

## 2020-03-06 LAB
ALBUMIN SERPL-MCNC: 3.3 G/DL (ref 3.4–5)
ALP SERPL-CCNC: 67 U/L (ref 40–150)
ALT SERPL W P-5'-P-CCNC: 33 U/L (ref 0–70)
ANION GAP SERPL CALCULATED.3IONS-SCNC: 8 MMOL/L (ref 3–14)
AST SERPL W P-5'-P-CCNC: 24 U/L (ref 0–45)
BILIRUB SERPL-MCNC: 0.7 MG/DL (ref 0.2–1.3)
BUN SERPL-MCNC: 17 MG/DL (ref 7–30)
CALCIUM SERPL-MCNC: 8.9 MG/DL (ref 8.5–10.1)
CHLORIDE SERPL-SCNC: 107 MMOL/L (ref 94–109)
CO2 SERPL-SCNC: 23 MMOL/L (ref 20–32)
CREAT SERPL-MCNC: 0.89 MG/DL (ref 0.66–1.25)
ERYTHROCYTE [DISTWIDTH] IN BLOOD BY AUTOMATED COUNT: 13.9 % (ref 10–15)
GFR SERPL CREATININE-BSD FRML MDRD: 90 ML/MIN/{1.73_M2}
GLUCOSE SERPL-MCNC: 88 MG/DL (ref 70–99)
HCT VFR BLD AUTO: 41 % (ref 40–53)
HGB BLD-MCNC: 13.3 G/DL (ref 13.3–17.7)
LIPASE SERPL-CCNC: 94 U/L (ref 73–393)
MCH RBC QN AUTO: 28.1 PG (ref 26.5–33)
MCHC RBC AUTO-ENTMCNC: 32.4 G/DL (ref 31.5–36.5)
MCV RBC AUTO: 87 FL (ref 78–100)
PLATELET # BLD AUTO: 235 10E9/L (ref 150–450)
POTASSIUM SERPL-SCNC: 4.3 MMOL/L (ref 3.4–5.3)
PROT SERPL-MCNC: 7.3 G/DL (ref 6.8–8.8)
RBC # BLD AUTO: 4.73 10E12/L (ref 4.4–5.9)
SODIUM SERPL-SCNC: 138 MMOL/L (ref 133–144)
WBC # BLD AUTO: 5.4 10E9/L (ref 4–11)

## 2020-03-06 PROCEDURE — 83690 ASSAY OF LIPASE: CPT | Performed by: FAMILY MEDICINE

## 2020-03-06 PROCEDURE — 85027 COMPLETE CBC AUTOMATED: CPT | Performed by: FAMILY MEDICINE

## 2020-03-06 PROCEDURE — 99214 OFFICE O/P EST MOD 30 MIN: CPT | Performed by: FAMILY MEDICINE

## 2020-03-06 PROCEDURE — 36415 COLL VENOUS BLD VENIPUNCTURE: CPT | Performed by: FAMILY MEDICINE

## 2020-03-06 PROCEDURE — 80053 COMPREHEN METABOLIC PANEL: CPT | Performed by: FAMILY MEDICINE

## 2020-03-06 RX ORDER — SUCRALFATE 1 G/1
1 TABLET ORAL 4 TIMES DAILY
Qty: 30 TABLET | Refills: 0 | Status: SHIPPED | OUTPATIENT
Start: 2020-03-06 | End: 2020-08-25

## 2020-03-06 ASSESSMENT — ENCOUNTER SYMPTOMS
CONSTIPATION: 1
FEVER: 0
HEARTBURN: 0
DIARRHEA: 0
NAUSEA: 0
HEMATOCHEZIA: 0
UNEXPECTED WEIGHT CHANGE: 1
ABDOMINAL DISTENTION: 1
ABDOMINAL PAIN: 1
VOMITING: 0

## 2020-03-06 ASSESSMENT — MIFFLIN-ST. JEOR: SCORE: 2036.82

## 2020-03-06 NOTE — PROGRESS NOTES
Subjective     Guanako Padilla is a 65 year old male who presents to clinic today for the following health issues:    HPI   ABDOMINAL   PAIN     Onset: 3-4 months     Description:   Character: Sharp, Dull ache, Fullness and Cramping  Location: centralized   Radiation: None    Intensity: severe    Progression of Symptoms:  worsening    Accompanying Signs & Symptoms:  Fever/Chills?: no   Gas/Bloating: YES  Nausea: no   Vomitting: no   Diarrhea?: YES  Constipation:YES  Dysuria or Hematuria: no    History:   Trauma: no   Previous similar pain: YES- yrs ago was an ulcer. Different sx   Previous tests done: none    Precipitating factors:   Does the pain change with:     Food: YES- after eating      BM: no    Urination: no     Alleviating factors:  Nothing     Therapies Tried and outcome: GAS X, omeprazole, probiotic      LMP:  not applicable     Patient is a pleasant 65-year-old male with history of iron deficiency anemia and gastric ulcer who presents to the clinic with concerns of abdominal pain.  His wife, RN, accompanies him today.   Per chart review, had an upper GI endoscopy 01-8-2016 that showed a nonbleeding cratered gastric ulcer.  Similar symptoms as then, he wonders if he has a redevelopment of ulcer.  His abdominal pain worsens after eating food.    His wife has discontinued his NSAID use, previously , and chronic residual joint aches and pains.  I started him on omeprazole 40 mg twice daily 30 minutes before meals.  He denies any changes in stool caliber, admits to some constipation has bowel movement every other day, there is no black, tarry or bright red stools.      Reviewed and updated as needed this visit by Provider         Review of Systems   Constitutional: Positive for unexpected weight change (20 lbs weight gain). Negative for fever.   Gastrointestinal: Positive for abdominal distention, abdominal pain and constipation. Negative for diarrhea, heartburn, hematochezia, nausea and vomiting.  "     Medications updated and reviewed.  Past, family and surgical history is updated and reviewed in the record.  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  Physical Exam  Constitutional:       Appearance: He is not ill-appearing.   Cardiovascular:      Rate and Rhythm: Normal rate and regular rhythm.   Pulmonary:      Effort: Pulmonary effort is normal.      Breath sounds: Normal breath sounds.   Abdominal:      Palpations: There is no mass.      Comments: Obese habitus, difficult to appreciate if this is overt distention.  The abdomen is soft, increased bowel sounds in all 4 quadrants.  No point tenderness to palpation, negative Gordon's.     Neurological:      Mental Status: He is alert.          Diagnostic Test Results:  Labs reviewed in Epic        Assessment & Plan     1. History of gastric ulcer  His symptoms are suspicious of a redevelopment of a gastric ulcer.  Check for H. pylori, CBC CMP and lipase.  Notably, has been self treating with PPI so the possibility for false negative H. pylori evaluation.  Referral to GI, appreciate help; may benefit from a repeat EGD.  In addition, he does have some features for possible functional dyspepsia however needs an EGD prior to this conclusion.  Okay to continue twice daily PPI, agree with discontinuing NSAIDs until further evaluation.  - Helicobacter pylori Antigen Stool; Future  - sucralfate (CARAFATE) 1 GM tablet; Take 1 tablet (1 g) by mouth 4 times daily  Dispense: 30 tablet; Refill: 0    2. Abdominal pain, epigastric  Abdomen as per above.  Other differential diagnosis includes " pancreatic irritation, slow transit constipation.  He does not have presentation or features to suggest an acute bowel obstruction.  - CBC with platelets  - Comprehensive metabolic panel (BMP + Alb, Alk Phos, ALT, AST, Total. Bili, TP)  - GASTROENTEROLOGY ADULT REF CONSULT ONLY  - Lipase      Return in about 1 week (around 3/13/2020) for If symptoms do not improve or gets worse..    George Heart MD  [facility]      Documentation was prepared using Dragon voice recognition software. Please excuse typographical errors. Please contact me if documentation is unclear.

## 2020-03-06 NOTE — PATIENT INSTRUCTIONS
Patient Education     Abdominal Pain    Abdominal pain is pain in the stomach or belly area. Everyone has this pain from time to time. In many cases it goes away on its own. But abdominal pain can sometimes be due to a serious problem, such as appendicitis. So it s important to know when to get help.  Causes of abdominal pain  There are many possible causes of abdominal pain. Common causes in adults include:    Constipation, diarrhea, or gas    Stomach acid flowing back up into the esophagus (acid reflux or heartburn)    Severe acid reflux, called GERD (gastroesophageal reflux disease)    A sore in the lining of the stomach or small intestine (peptic ulcer)    Inflammation of the gallbladder, liver, or pancreas    Gallstones or kidney stones    Appendicitis     Intestinal blockage     An internal organ pushing through a muscle or other tissue (hernia)    Urinary tract infections    In women, menstrual cramps, fibroids, ovarian cysts, pelvic inflammatory disease, or endometriosis    Inflammation or infection of the intestines, including Crohn's disease and ulcerative colitis    Irritable bowel syndrome  Diagnosing the cause of abdominal pain  Your healthcare provider will give you a physical exam help find the cause of your pain. If needed, you will have tests. Belly pain has many possible causes. So it can be hard to find the reason for your pain. Giving details about your pain can help. Tell your provider where and when you feel the pain, and what makes it better or worse. Also let your provider know if you have other symptoms such as:    Fever    Tiredness    Upset stomach (nausea)    Vomiting    Changes in bathroom habits    Blood in the stool or black, tarry stool    Weight loss that you can't explain (involuntary weight loss?)  Also report any family history of stomach or intestinal problems, or cancers. Tell your provider about all your alcohol use and drug use. Tell your provider about all medicines you use,  including herbs, vitamins, and supplements.   Treating abdominal pain  Some causes of pain need emergency medical treatment right away. These include appendicitis or a bowel blockage. Other problems can be treated with rest, fluids, or medicines. Your healthcare provider can give you specific instructions for treatment or self-care based on what is causing your pain.     If you have vomiting or diarrhea, sip water or other clear fluids. When you are ready to eat solid foods again, start with small amounts of easy-to-digest, low-fat foods. These include apple sauce, toast, or crackers.  When to get medical care  Call 911 or go to the hospital right away if you:    Can t pass stool and are vomiting    Are vomiting blood or have bloody diarrhea or black, tarry diarrhea    Have chest, neck, or shoulder pain    Feel like you might pass out    Have pain in your shoulder blades with nausea    Have sudden, severe belly pain    Have new, severe pain unlike any you have felt before    Have a belly that is rigid, hard, and hurts to touch  Call your healthcare provider if you have:    Pain for more than 5 days    Bloating for more than 2 days    Diarrhea for more than 5 days    A fever of 100.4 F (38 C) or higher, or as directed by your healthcare provider    Pain that gets worse    Weight loss for no reason    Continued lack of appetite    Blood in your stool  How to prevent abdominal pain  Here are some tips to help prevent abdominal pain:    Eat smaller amounts of food at each meal.    Don't eat greasy, fried, or other high-fat foods.    Don't eat foods that give you gas.    Exercise regularly.    Drink plenty of fluids.  To help prevent GERD symptoms:    Quit smoking.    Reduce alcohol and foods that increase stomach acid.    Don't use aspirin or over-the-counter pain and fever medicines, if possible. This includes nonsteroidal anti-inflammatory drugs (NSAIDs).    Lose excess weight.    Finish eating at least 2 hours before  you go to bed or lie down.    Raise the head of your bed.  Date Last Reviewed: 7/1/2016 2000-2019 [employer]. [address] [place] [address], [place] [address]. All rights reserved. This information is not intended as a substitute for professional medical care. Always follow your healthcare professional's instructions.

## 2020-03-08 PROCEDURE — 87338 HPYLORI STOOL AG IA: CPT | Performed by: FAMILY MEDICINE

## 2020-03-09 DIAGNOSIS — Z87.11 HISTORY OF GASTRIC ULCER: ICD-10-CM

## 2020-03-10 LAB — H PYLORI AG STL QL IA: NEGATIVE

## 2020-03-15 ENCOUNTER — HEALTH MAINTENANCE LETTER (OUTPATIENT)
Age: 65
End: 2020-03-15

## 2020-03-19 ENCOUNTER — TRANSFERRED RECORDS (OUTPATIENT)
Dept: HEALTH INFORMATION MANAGEMENT | Facility: CLINIC | Age: 65
End: 2020-03-19

## 2020-03-19 LAB
ALT SERPL-CCNC: 27 U/L (ref 0–78)
AST SERPL-CCNC: 18 U/L (ref 0–37)
CREAT SERPL-MCNC: 1.01 MG/DL (ref 0.7–1.3)
GFR SERPL CREATININE-BSD FRML MDRD: >60 ML/MIN/1.73M2
GLUCOSE SERPL-MCNC: 89 MG/DL (ref 74–100)
POTASSIUM SERPL-SCNC: 4.5 MMOL/L (ref 3.5–5.1)

## 2020-04-29 ENCOUNTER — TRANSFERRED RECORDS (OUTPATIENT)
Dept: HEALTH INFORMATION MANAGEMENT | Facility: CLINIC | Age: 65
End: 2020-04-29

## 2020-08-24 NOTE — PROGRESS NOTES
"SUBJECTIVE:   Guanako Padilla is a 65 year old male who presents for Preventive Visit.    Are you in the first 12 months of your Medicare coverage?  Yes,   Healthy Habits:     In general, how would you rate your overall health?  Good    Frequency of exercise:  2-3 days/week    Duration of exercise:  45-60 minutes    Do you usually eat at least 4 servings of fruit and vegetables a day, include whole grains    & fiber and avoid regularly eating high fat or \"junk\" foods?  No    Taking medications regularly:  Yes    Medication side effects:  Not applicable    Ability to successfully perform activities of daily living:  No assistance needed    Home Safety:  No safety concerns identified    Hearing Impairment:  No hearing concerns    In the past 6 months, have you been bothered by leaking of urine?  No    In general, how would you rate your overall mental or emotional health?  Excellent      PHQ-2 Total Score: 0    Additional concerns today:  Yes    Do you feel safe in your environment? Yes    Have you ever done Advance Care Planning? (For example, a Health Directive, POLST, or a discussion with a medical provider or your loved ones about your wishes): Yes, patient states has an Advance Care Planning document and will bring a copy to the clinic.      Fall risk  Fallen 2 or more times in the past year?: (P) Yes  Any fall with injury in the past year?: (P) No    Cognitive Screening   1) Repeat 3 items (Leader, Season, Table)    2) Clock draw: NORMAL  3) 3 item recall: Recalls 2 objects   Results: NORMAL clock, 1-2 items recalled: COGNITIVE IMPAIRMENT LESS LIKELY    Mini-CogTM Copyright JANETH Das. Licensed by the author for use in [facility]; reprinted with permission (kerri@.[email or web address]). All rights reserved.      Do you have sleep apnea, excessive snoring or daytime drowsiness?: no    Reviewed and updated as needed this visit by clinical staff         Reviewed and updated as needed this visit by Provider        Social " History     Tobacco Use     Smoking status: Current Every Day Smoker     Smokeless tobacco: Never Used   Substance Use Topics     Alcohol use: Yes     Alcohol/week: 0.0 standard drinks     Comment: 1 1/2 beers per day         Alcohol Use 1/24/2019   Prescreen: >3 drinks/day or >7 drinks/week? Yes   Prescreen: >3 drinks/day or >7 drinks/week? -      Neck Pain  Onset: 2 weeks ago    Description:   Location: neck  Radiation: none    Intensity: moderate    Progression of Symptoms:  intermittent and waxing and waning    Accompanying Signs & Symptoms:  Burning, prickly sensation (paresthesias) in arm(s): no  Numbness in arm(s): no   Weakness in arm(s):  no   Fever: no   Headache: no   Nausea and/or vomiting: no     History:   Trauma: no   Previous neck pain: no   Previous surgery or injections: no   Previous Imaging (MRI,X ray): no     Precipitating factors:   Does movement increase the pain:  YES    Alleviating factors:  Massage helps    Therapies Tried and outcome:  nothing    Current providers sharing in care for this patient include:   Patient Care Team:  Gricel Woo MD as PCP - General (Family Practice)  George Heart MD as Assigned PCP  Cally Bautista as Personal Advocate & Liaison (PAL) (Family Practice)    The following health maintenance items are reviewed in Epic and correct as of today:  Health Maintenance   Topic Date Due     ANNUAL REVIEW OF HM ORDERS  1955     ADVANCE CARE PLANNING  1955     ZOSTER IMMUNIZATION (1 of 2) 01/14/2005     MEDICARE ANNUAL WELLNESS VISIT  01/14/2020     PNEUMOCOCCAL IMMUNIZATION 65+ LOW/MEDIUM RISK (1 of 2 - PCV13) 01/14/2020     AORTIC ANEURYSM SCREENING (SYSTEM ASSIGNED)  01/14/2020     INFLUENZA VACCINE (1) 09/01/2020     FALL RISK ASSESSMENT  03/06/2021     LIPID  11/16/2023     COLORECTAL CANCER SCREENING  01/08/2026     DTAP/TDAP/TD IMMUNIZATION (4 - Td) 07/24/2027     HEPATITIS C SCREENING  Completed     HIV SCREENING  Completed     PHQ-2  Completed     IPV  "IMMUNIZATION  Aged Out     MENINGITIS IMMUNIZATION  Aged Out     HEPATITIS B IMMUNIZATION  Aged Out     Lab work is in process  Labs reviewed in EPIC  BP Readings from Last 3 Encounters:   08/25/20 121/78   03/06/20 130/76   11/13/19 (!) 133/91    Wt Readings from Last 3 Encounters:   08/25/20 111 kg (244 lb 12.8 oz)   03/06/20 118.2 kg (260 lb 9.6 oz)   11/13/19 108.9 kg (240 lb)                  Patient Active Problem List   Diagnosis     Disorder of bursae and tendons in shoulder region     Shoulder pain     Shoulder impingement syndrome     Internal derangement of shoulder     CARDIOVASCULAR SCREENING; LDL GOAL LESS THAN 160     Pseudogout     Peptic ulcer     Primary osteoarthritis of both knees     Iron deficiency anemia due to chronic blood loss     Arthritis of elbow     Incomplete tear of right rotator cuff     Past Surgical History:   Procedure Laterality Date     COLONOSCOPY  1/8/2016    Dr. Covarrubias [facility]     COLONOSCOPY  1/8/2016    Procedure: COLONOSCOPY;  Surgeon: Juan Covarrubias MD;  Location:  GI     ESOPHAGOSCOPY, GASTROSCOPY, DUODENOSCOPY (EGD), COMBINED  1/8/2016    Dr. Covarrubias [facility]     ESOPHAGOSCOPY, GASTROSCOPY, DUODENOSCOPY (EGD), COMBINED N/A 1/8/2016    Procedure: COMBINED ESOPHAGOSCOPY, GASTROSCOPY, DUODENOSCOPY (EGD), BIOPSY SINGLE OR MULTIPLE;  Surgeon: Juan Covarrubias MD;  Location:  GI     ORTHOPEDIC SURGERY Left 1976    left shoulder, rotator cuff repair     ORTHOPEDIC SURGERY Right 1980    wrist tendon repair     ORTHOPEDIC SURGERY Right 2011    right foot fx-metal in foot-ORIF     ORTHOPEDIC SURGERY Right 1981    Right elbow, dx \"little league elbow\", it was cleaned out     ORTHOPEDIC SURGERY Right 2008    Right shoulder, rotator cuff repair       Social History     Tobacco Use     Smoking status: Current Every Day Smoker     Packs/day: 0.50     Smokeless tobacco: Never Used   Substance Use Topics     Alcohol use: Yes     Alcohol/week: 0.0 standard drinks     Comment: 1 1/2 " "beers per day     Family History   Problem Relation Age of Onset     Alzheimer Disease Mother         Dementia     Cerebrovascular Disease Father      Cancer Maternal Grandfather      Colon Cancer No family hx of          Current Outpatient Medications   Medication Sig Dispense Refill     Cholecalciferol (VITAMIN D3 PO) Take by mouth daily       ibuprofen (ADVIL/MOTRIN) 600 MG tablet Take 600 mg by mouth       magnesium oxide 400 MG CAPS        Omega-3 Fatty Acids (FISH OIL) 1200 MG capsule        omeprazole (PRILOSEC) 20 MG DR capsule Take 20 mg by mouth       Pediatric Multivitamins-Iron (FLINTSTONES PLUS IRON PO)        sucralfate (CARAFATE) 1 GM tablet Take 1 tablet (1 g) by mouth 4 times daily 30 tablet 0     No Known Allergies  Pt noticed that he has been more imbalanced than previous, and he fall easily, and he is still having muscle aches all over the body.    Review of Systems   Constitutional: Negative for chills and fever.   HENT: Negative for congestion, ear pain, hearing loss and sore throat.    Eyes: Negative for pain and visual disturbance.   Respiratory: Negative for cough and shortness of breath.    Cardiovascular: Negative for chest pain, palpitations and peripheral edema.   Gastrointestinal: Negative for abdominal pain, constipation, diarrhea, heartburn, hematochezia and nausea.   Genitourinary: Positive for discharge and urgency. Negative for dysuria, frequency, genital sores and hematuria.   Musculoskeletal: Positive for myalgias. Negative for arthralgias and joint swelling.   Skin: Negative for rash.   Neurological: Negative for dizziness, weakness, headaches and paresthesias.   Psychiatric/Behavioral: Negative for mood changes. The patient is not nervous/anxious.          OBJECTIVE:   There were no vitals taken for this visit. Estimated body mass index is 33.46 kg/m  as calculated from the following:    Height as of 3/6/20: 1.88 m (6' 2\").    Weight as of 3/6/20: 118.2 kg (260 lb 9.6 " oz).  Physical Exam  GENERAL: healthy, alert and no distress  EYES: Eyes grossly normal to inspection, PERRL and conjunctivae and sclerae normal  HENT: ear canals and TM's normal, nose and mouth without ulcers or lesions  NECK: no adenopathy, no asymmetry, masses, or scars and thyroid normal to palpation  RESP: lungs clear to auscultation - no rales, rhonchi or wheezes  CV: regular rate and rhythm, normal S1 S2, no S3 or S4, no murmur, click or rub, no peripheral edema and peripheral pulses strong  ABDOMEN: soft, nontender, no hepatosplenomegaly, no masses and bowel sounds normal  MS: Neck exam :  Inspection: no rigidity.  ROM of the cervical spine :painful on the left turning, paraspinal muscles tenderness nl, Trapeza tenderness negative  C5:deltoid strengthnl sensation over the deltoid nl  C6 biceps strength nl sensation over the radial part of the arm and thumb nl  C7 triceps and wrist extension nl, sensation of the middle fingers of the hand nl  T1 hand  nl, sensation on the ulnar part of the arm and hand nl  Spurling's maneuver: negative  Upper limb tension test (Elvey's test) not done.    SKIN: no suspicious lesions or rashes  NEURO: Normal strength and tone, mentation intact and speech normal  PSYCH: mentation appears normal, affect normal/bright        ASSESSMENT / PLAN:   1. Encounter for Medicare annual wellness exam  Doing very good, Today I counseled the patient about diet, regular exercise and weight loss planning.    - Lipid panel reflex to direct LDL Fasting    2. Benign prostatic hyperplasia with urinary hesitancy  Mostly the cause of his symptoms, mild in nature, and does not wish any treatment at this time.  - Prostate spec antigen screen    3. Muscle pain  Chronic, mainly in the shoulders and legs, will check   - ESR: Erythrocyte sedimentation rate    4. Encounter for screening for malignant neoplasm of prostate     - Prostate spec antigen screen    5. Slow transit constipation  Talked about  "increasing fiber in diet, increase water intake and physical activity.      6. Screening for AAA (abdominal aortic aneurysm)    - US Aortic Imaging; Future    7. Need for vaccination    - PPSV23, IM/SUBQ (2+ YRS) - [id number]    COUNSELING:  Reviewed preventive health counseling, as reflected in patient instructions       Regular exercise       Healthy diet/nutrition    Estimated body mass index is 33.46 kg/m  as calculated from the following:    Height as of 3/6/20: 1.88 m (6' 2\").    Weight as of 3/6/20: 118.2 kg (260 lb 9.6 oz).    Weight management plan: Discussed healthy diet and exercise guidelines    He reports that he has been smoking. He has never used smokeless tobacco.  Tobacco Cessation Action Plan:   Information offered: Patient not interested at this time      Appropriate preventive services were discussed with this patient, including applicable screening as appropriate for cardiovascular disease, diabetes, osteopenia/osteoporosis, and glaucoma.  As appropriate for age/gender, discussed screening for colorectal cancer, prostate cancer, breast cancer, and cervical cancer. Checklist reviewing preventive services available has been given to the patient.    Reviewed patients plan of care and provided an AVS. The Intermediate Care Plan ( asthma action plan, low back pain action plan, and migraine action plan) for Guanako meets the Care Plan requirement. This Care Plan has been established and reviewed with the Patient.    Counseling Resources:  ATP IV Guidelines  Pooled Cohorts Equation Calculator  Breast Cancer Risk Calculator  Breast Cancer: Medication to Reduce Risk  FRAX Risk Assessment  ICSI Preventive Guidelines  Dietary Guidelines for Americans, 2010  USDA's MyPlate  ASA Prophylaxis  Lung CA Screening    Gricel Woo MD  [facility]    Identified Health Risks:Hi, my name is Cally Bautista with [facility].     I am reaching out on behalf of Gricel Woo  to get you " "ready for your upcoming appointment.     Next 5 appointments (look out 90 days)    Aug 25, 2020  1:00 PM CDT  (Arrive by 12:30 PM)  Welcome to Medicare Visit with Gricel Woo MD  [facility] [address] [place] [id number] [phone]           I see that you coming in for ...     1. Are there any additional questions or concerns you'd like to review with your provider during your visit?\"     2. Please review your medication list below, if anything needs to be changed please respond with that information       @     3. Which pharmacy do you prefer to use for medications during this visit if needed?     4. Do you need refills on any of your medications ?    5.  Please complete the attached questionnaires prior to your visit     Thank you, if you prefer to discuss these questions over the phone please call me directly at the number below     Please call [phone]  Cally Bautista, EMT-b  (Personal Advocate Kayce)   [facility]   Answers for HPI/ROS submitted by the patient on 8/25/2020   Annual Exam:  PHQ9 TOTAL SCORE: 0  FAYE 7 TOTAL SCORE: 0    "

## 2020-08-25 ENCOUNTER — OFFICE VISIT (OUTPATIENT)
Dept: FAMILY MEDICINE | Facility: CLINIC | Age: 65
End: 2020-08-25
Payer: COMMERCIAL

## 2020-08-25 VITALS
HEART RATE: 77 BPM | DIASTOLIC BLOOD PRESSURE: 78 MMHG | SYSTOLIC BLOOD PRESSURE: 121 MMHG | TEMPERATURE: 98.4 F | WEIGHT: 244.8 LBS | BODY MASS INDEX: 30.44 KG/M2 | RESPIRATION RATE: 16 BRPM | OXYGEN SATURATION: 98 % | HEIGHT: 75 IN

## 2020-08-25 DIAGNOSIS — K59.01 SLOW TRANSIT CONSTIPATION: ICD-10-CM

## 2020-08-25 DIAGNOSIS — Z00.00 ENCOUNTER FOR MEDICARE ANNUAL WELLNESS EXAM: Primary | ICD-10-CM

## 2020-08-25 DIAGNOSIS — N40.1 BENIGN PROSTATIC HYPERPLASIA WITH URINARY HESITANCY: ICD-10-CM

## 2020-08-25 DIAGNOSIS — Z23 NEED FOR VACCINATION: ICD-10-CM

## 2020-08-25 DIAGNOSIS — F17.200 SMOKING: ICD-10-CM

## 2020-08-25 DIAGNOSIS — M79.10 MUSCLE PAIN: ICD-10-CM

## 2020-08-25 DIAGNOSIS — Z13.6 SCREENING FOR AAA (ABDOMINAL AORTIC ANEURYSM): ICD-10-CM

## 2020-08-25 DIAGNOSIS — R39.11 BENIGN PROSTATIC HYPERPLASIA WITH URINARY HESITANCY: ICD-10-CM

## 2020-08-25 DIAGNOSIS — Z12.5 ENCOUNTER FOR SCREENING FOR MALIGNANT NEOPLASM OF PROSTATE: ICD-10-CM

## 2020-08-25 LAB — ERYTHROCYTE [SEDIMENTATION RATE] IN BLOOD BY WESTERGREN METHOD: 9 MM/H (ref 0–20)

## 2020-08-25 PROCEDURE — 80061 LIPID PANEL: CPT | Performed by: FAMILY MEDICINE

## 2020-08-25 PROCEDURE — 90471 IMMUNIZATION ADMIN: CPT | Performed by: FAMILY MEDICINE

## 2020-08-25 PROCEDURE — 36415 COLL VENOUS BLD VENIPUNCTURE: CPT | Performed by: FAMILY MEDICINE

## 2020-08-25 PROCEDURE — 99213 OFFICE O/P EST LOW 20 MIN: CPT | Mod: 25 | Performed by: FAMILY MEDICINE

## 2020-08-25 PROCEDURE — 99397 PER PM REEVAL EST PAT 65+ YR: CPT | Mod: 25 | Performed by: FAMILY MEDICINE

## 2020-08-25 PROCEDURE — 90732 PPSV23 VACC 2 YRS+ SUBQ/IM: CPT | Performed by: FAMILY MEDICINE

## 2020-08-25 PROCEDURE — 85652 RBC SED RATE AUTOMATED: CPT | Performed by: FAMILY MEDICINE

## 2020-08-25 PROCEDURE — G0103 PSA SCREENING: HCPCS | Performed by: FAMILY MEDICINE

## 2020-08-25 ASSESSMENT — ENCOUNTER SYMPTOMS
DIZZINESS: 0
HEMATOCHEZIA: 0
ARTHRALGIAS: 0
HEADACHES: 0
CHILLS: 0
FEVER: 0
DYSURIA: 0
FREQUENCY: 0
HEMATURIA: 0
SORE THROAT: 0
NERVOUS/ANXIOUS: 0
PARESTHESIAS: 0
JOINT SWELLING: 0
MYALGIAS: 1
COUGH: 0
CONSTIPATION: 0
NAUSEA: 0
DIARRHEA: 0
SHORTNESS OF BREATH: 0
EYE PAIN: 0
ABDOMINAL PAIN: 0
HEARTBURN: 0
PALPITATIONS: 0
WEAKNESS: 0

## 2020-08-25 ASSESSMENT — ANXIETY QUESTIONNAIRES
2. NOT BEING ABLE TO STOP OR CONTROL WORRYING: NOT AT ALL
6. BECOMING EASILY ANNOYED OR IRRITABLE: NOT AT ALL
5. BEING SO RESTLESS THAT IT IS HARD TO SIT STILL: NOT AT ALL
7. FEELING AFRAID AS IF SOMETHING AWFUL MIGHT HAPPEN: NOT AT ALL
4. TROUBLE RELAXING: NOT AT ALL
1. FEELING NERVOUS, ANXIOUS, OR ON EDGE: NOT AT ALL
GAD7 TOTAL SCORE: 0
3. WORRYING TOO MUCH ABOUT DIFFERENT THINGS: NOT AT ALL
7. FEELING AFRAID AS IF SOMETHING AWFUL MIGHT HAPPEN: NOT AT ALL
GAD7 TOTAL SCORE: 0
GAD7 TOTAL SCORE: 0

## 2020-08-25 ASSESSMENT — PATIENT HEALTH QUESTIONNAIRE - PHQ9
SUM OF ALL RESPONSES TO PHQ QUESTIONS 1-9: 0
SUM OF ALL RESPONSES TO PHQ QUESTIONS 1-9: 0

## 2020-08-25 ASSESSMENT — ACTIVITIES OF DAILY LIVING (ADL): CURRENT_FUNCTION: NO ASSISTANCE NEEDED

## 2020-08-25 ASSESSMENT — MIFFLIN-ST. JEOR: SCORE: 1973.1

## 2020-08-25 NOTE — PATIENT INSTRUCTIONS
Patient Education   Personalized Prevention Plan  You are due for the preventive services outlined below.  Your care team is available to assist you in scheduling these services.  If you have already completed any of these items, please share that information with your care team to update in your medical record.  Health Maintenance Due   Topic Date Due     ANNUAL REVIEW OF HM ORDERS  1955     Discuss Advance Care Planning  1955     Zoster (Shingles) Vaccine (1 of 2) 01/14/2005     Annual Wellness Visit  01/14/2020     Pneumococcal Vaccine (1 of 2 - PCV13) 01/14/2020     AORTIC ANEURYSM SCREENING (SYSTEM ASSIGNED)  01/14/2020     Flu Vaccine (1) 09/01/2020

## 2020-08-26 ENCOUNTER — TELEPHONE (OUTPATIENT)
Dept: FAMILY MEDICINE | Facility: CLINIC | Age: 65
End: 2020-08-26

## 2020-08-26 LAB
CHOLEST SERPL-MCNC: 223 MG/DL
HDLC SERPL-MCNC: 71 MG/DL
LDLC SERPL CALC-MCNC: 132 MG/DL
NONHDLC SERPL-MCNC: 152 MG/DL
PSA SERPL-ACNC: 0.37 UG/L (ref 0–4)
TRIGL SERPL-MCNC: 101 MG/DL

## 2020-08-26 ASSESSMENT — ANXIETY QUESTIONNAIRES: GAD7 TOTAL SCORE: 0

## 2020-08-26 ASSESSMENT — PATIENT HEALTH QUESTIONNAIRE - PHQ9: SUM OF ALL RESPONSES TO PHQ QUESTIONS 1-9: 0

## 2020-08-26 NOTE — TELEPHONE ENCOUNTER
Please call Galen, his cholesterol is still high,and his risk for heart disease is higher than 10 % and it is mainly because of smoking.  If patient is willing to quit smoking completely (not even during fishing), then we don't have to start on any medications.  But if he is not, then his risk will be high, and he needs to be on statin drugs like zocor or lipitor.     The 10-year ASCVD risk score (Lucy WEIR Jr., et al., 2013) is: 14.1%    Values used to calculate the score:      Age: 65 years      Sex: Male      Is Non- : No      Diabetic: No      Tobacco smoker: Yes      Systolic Blood Pressure: 121 mmHg      Is BP treated: No      HDL Cholesterol: 71 mg/dL      Total Cholesterol: 223 mg/dL

## 2020-08-31 NOTE — TELEPHONE ENCOUNTER
Called patient and advised of below.  Patient states he would like to try not smoking even during fishing.  Tomasa King RN

## 2021-10-24 ENCOUNTER — HEALTH MAINTENANCE LETTER (OUTPATIENT)
Age: 66
End: 2021-10-24

## 2021-12-16 ENCOUNTER — OFFICE VISIT (OUTPATIENT)
Dept: INTERNAL MEDICINE | Facility: CLINIC | Age: 66
End: 2021-12-16
Payer: COMMERCIAL

## 2021-12-16 VITALS
WEIGHT: 257 LBS | OXYGEN SATURATION: 97 % | HEART RATE: 66 BPM | HEIGHT: 75 IN | TEMPERATURE: 98.3 F | BODY MASS INDEX: 31.95 KG/M2 | SYSTOLIC BLOOD PRESSURE: 128 MMHG | DIASTOLIC BLOOD PRESSURE: 83 MMHG | RESPIRATION RATE: 20 BRPM

## 2021-12-16 DIAGNOSIS — R63.5 WEIGHT GAIN: ICD-10-CM

## 2021-12-16 DIAGNOSIS — Z12.5 SCREENING FOR PROSTATE CANCER: ICD-10-CM

## 2021-12-16 DIAGNOSIS — K59.01 SLOW TRANSIT CONSTIPATION: ICD-10-CM

## 2021-12-16 DIAGNOSIS — Z00.01 ENCOUNTER FOR GENERAL ADULT MEDICAL EXAMINATION WITH ABNORMAL FINDINGS: Primary | ICD-10-CM

## 2021-12-16 PROBLEM — M25.511 CHRONIC RIGHT SHOULDER PAIN: Status: ACTIVE | Noted: 2019-02-26

## 2021-12-16 PROBLEM — M79.10 MUSCLE PAIN: Status: RESOLVED | Noted: 2020-08-25 | Resolved: 2021-12-16

## 2021-12-16 PROBLEM — G89.29 CHRONIC RIGHT SHOULDER PAIN: Status: ACTIVE | Noted: 2019-02-26

## 2021-12-16 PROBLEM — Z98.890 H/O SHOULDER SURGERY: Status: ACTIVE | Noted: 2019-04-30

## 2021-12-16 PROCEDURE — 84443 ASSAY THYROID STIM HORMONE: CPT | Performed by: INTERNAL MEDICINE

## 2021-12-16 PROCEDURE — 80053 COMPREHEN METABOLIC PANEL: CPT | Performed by: INTERNAL MEDICINE

## 2021-12-16 PROCEDURE — 36415 COLL VENOUS BLD VENIPUNCTURE: CPT | Performed by: INTERNAL MEDICINE

## 2021-12-16 PROCEDURE — G0438 PPPS, INITIAL VISIT: HCPCS | Performed by: INTERNAL MEDICINE

## 2021-12-16 PROCEDURE — G0103 PSA SCREENING: HCPCS | Performed by: INTERNAL MEDICINE

## 2021-12-16 PROCEDURE — 99213 OFFICE O/P EST LOW 20 MIN: CPT | Mod: 25 | Performed by: INTERNAL MEDICINE

## 2021-12-16 ASSESSMENT — ENCOUNTER SYMPTOMS
ARTHRALGIAS: 1
MYALGIAS: 1
DYSURIA: 0
DIARRHEA: 1
CONSTIPATION: 1
ABDOMINAL PAIN: 0
CHILLS: 0
EYE PAIN: 0
COUGH: 0
FEVER: 0
HEADACHES: 0
PARESTHESIAS: 0
HEMATOCHEZIA: 0
HEMATURIA: 0
DIZZINESS: 0
NAUSEA: 0
NERVOUS/ANXIOUS: 0
SORE THROAT: 0
HEARTBURN: 0
JOINT SWELLING: 0
FREQUENCY: 1

## 2021-12-16 ASSESSMENT — PATIENT HEALTH QUESTIONNAIRE - PHQ9
SUM OF ALL RESPONSES TO PHQ QUESTIONS 1-9: 4
10. IF YOU CHECKED OFF ANY PROBLEMS, HOW DIFFICULT HAVE THESE PROBLEMS MADE IT FOR YOU TO DO YOUR WORK, TAKE CARE OF THINGS AT HOME, OR GET ALONG WITH OTHER PEOPLE: NOT DIFFICULT AT ALL
SUM OF ALL RESPONSES TO PHQ QUESTIONS 1-9: 4

## 2021-12-16 ASSESSMENT — ACTIVITIES OF DAILY LIVING (ADL): CURRENT_FUNCTION: NO ASSISTANCE NEEDED

## 2021-12-16 ASSESSMENT — MIFFLIN-ST. JEOR: SCORE: 2031.37

## 2021-12-16 NOTE — PROGRESS NOTES
"SUBJECTIVE:   Guanako Padilla is a 66 year old male who presents for Preventive Visit.      Patient has been advised of split billing requirements and indicates understanding: Yes   Are you in the first 12 months of your Medicare coverage?  No    Patient is a 66-year-old  male who presents to the clinic for his annual wellness visit.  He is also had some issues with weight gain over the past 2 years.  Patient reports that he has gained approximately 20 to 30 pounds over the past 2 years.  Review of his medical record does show that he has gained 13 pounds since his last physical in August 2020.  Patient has also had some persistent issues with slow transit constipation.  He states he has very significant difficulties voiding his bowels, and he feels bloated at times.  Patient has been taking MiraLAX quite frequently for his symptoms for approximately 1 year.  Patient states that he and his wife did go to a [facility] clinic for further evaluation of his issues.  Patient states they did not do any diagnostic testing, but they did recommend that he continue with the MiraLAX.  Patient is not reporting any issues with fatigue, hair loss, heat/cold intolerance, or depressed mood.  He does not currently take any medications.  Patient had no other major concerns or issues today.  He is not fasting for labs.  Patient has received his influenza and Covid vaccinations.  He did state that he would consider the Shingrix vaccination series further before receiving it.    Healthy Habits:     In general, how would you rate your overall health?  Good    Frequency of exercise:  None    Do you usually eat at least 4 servings of fruit and vegetables a day, include whole grains    & fiber and avoid regularly eating high fat or \"junk\" foods?  No    Taking medications regularly:  Yes    Ability to successfully perform activities of daily living:  No assistance needed    Home Safety:  No safety concerns " identified    Hearing Impairment:  No hearing concerns    In the past 6 months, have you been bothered by leaking of urine?  No    In general, how would you rate your overall mental or emotional health?  Good      PHQ-2 Total Score: 0    Do you feel safe in your environment? Yes    Have you ever done Advance Care Planning? (For example, a Health Directive, POLST, or a discussion with a medical provider or your loved ones about your wishes): No, advance care planning information given to patient to review.  Advanced care planning was discussed at today's visit.       Fall risk       Fallen two or more times in the last year   No   Any fall with an injury in the last year  No    Cognitive Screening   1) Repeat 3 items (Leader, Season, Table)      2) Clock draw:   NORMAL  3) 3 item recall:   Recalls NO objects   Results: 0 items recalled: PROBABLE COGNITIVE IMPAIRMENT, **INFORM PROVIDER**    Mini-CogTM Copyright S [employer]. Licensed by the author for use in [facility]; reprinted with permission ([email or web address]). All rights reserved.      Do you have sleep apnea, excessive snoring or daytime drowsiness?: no    Reviewed and updated as needed this visit by clinical staff   Allergies  Meds              Reviewed and updated as needed this visit by Provider               Social History     Tobacco Use     Smoking status: Current Every Day Smoker     Packs/day: 0.50     Smokeless tobacco: Never Used   Substance Use Topics     Alcohol use: Yes     Alcohol/week: 0.0 standard drinks     Comment: 1 1/2 beers per day         Alcohol Use 12/16/2021   Prescreen: >3 drinks/day or >7 drinks/week? Yes   Prescreen: >3 drinks/day or >7 drinks/week? -   AUDIT SCORE  4               Current providers sharing in care for this patient include:   Patient Care Team:  Gricel Woo MD as PCP - General (Family Practice)  Gricel Woo MD as Assigned PCP  Susan Raymond as Personal Advocate & Liaison (PAL) (Family Medicine)    The  "following health maintenance items are reviewed in Epic and correct as of today:  Health Maintenance Due   Topic Date Due     ZOSTER IMMUNIZATION (1 of 2) Never done     LUNG CANCER SCREENING  Never done     AORTIC ANEURYSM SCREENING (SYSTEM ASSIGNED)  Never done     MEDICARE ANNUAL WELLNESS VISIT  08/25/2021     ANNUAL REVIEW OF HM ORDERS  08/25/2021     FALL RISK ASSESSMENT  08/25/2021           Review of Systems   Constitutional: Negative for chills and fever.   HENT: Negative for congestion, ear pain, hearing loss and sore throat.    Eyes: Negative for pain and visual disturbance.   Respiratory: Negative for cough.    Cardiovascular: Negative for chest pain and peripheral edema.   Gastrointestinal: Positive for constipation and diarrhea. Negative for abdominal pain, heartburn, hematochezia and nausea.   Genitourinary: Positive for frequency and urgency. Negative for dysuria, genital sores and hematuria.   Musculoskeletal: Positive for arthralgias and myalgias. Negative for joint swelling.   Skin: Negative for rash.   Neurological: Negative for dizziness, headaches and paresthesias.   Psychiatric/Behavioral: Negative for mood changes. The patient is not nervous/anxious.      OBJECTIVE:   There were no vitals taken for this visit. Estimated body mass index is 31.01 kg/m  as calculated from the following:    Height as of 8/25/20: 1.892 m (6' 2.5\").    Weight as of 8/25/20: 111 kg (244 lb 12.8 oz).   Blood pressure 128/83, pulse 66, temperature 98.3  F (36.8  C), resp. rate 20, height 1.905 m (6' 3\"), weight 116.6 kg (257 lb), SpO2 97 %.      Physical Exam  Constitutional:       Appearance: Normal appearance.   HENT:      Head: Normocephalic.      Right Ear: Tympanic membrane, ear canal and external ear normal.      Left Ear: Tympanic membrane, ear canal and external ear normal.      Mouth/Throat:      Mouth: Mucous membranes are moist.      Pharynx: Oropharynx is clear.   Eyes:      Extraocular Movements: " Extraocular movements intact.      Conjunctiva/sclera: Conjunctivae normal.      Pupils: Pupils are equal, round, and reactive to light.   Cardiovascular:      Rate and Rhythm: Normal rate and regular rhythm.      Pulses: Normal pulses.      Heart sounds: Normal heart sounds.   Pulmonary:      Effort: Pulmonary effort is normal.      Breath sounds: Normal breath sounds.   Abdominal:      General: Abdomen is flat. Bowel sounds are normal.      Palpations: Abdomen is soft.   Musculoskeletal:         General: Normal range of motion.   Skin:     General: Skin is warm.      Capillary Refill: Capillary refill takes less than 2 seconds.   Neurological:      General: No focal deficit present.      Mental Status: He is alert and oriented to person, place, and time.           Diagnostic Test Results: CMP, PSA, and TSH with reflex free T4 are pending.    ASSESSMENT / PLAN:   (Z00.01) Encounter for general adult medical examination with abnormal findings  (primary encounter diagnosis)  Comment: At this time, patient does have a relatively unremarkable for his examination.  He is noted to have had a 13 pound weight gain since his last visit in August 2020.  We did review his annual wellness plan.  Patient did have some issues with 3 item recall, but he did seem to be quite focused and concerned about his weight gain and bowel issues.  Patient did declined any further evaluation of this particular issue, but we will continue to monitor for any changes.  He is not fasting for labs today.  Patient has received his influenza and Covid vaccinations.  He did defer the shingles vaccination series at this time.  He is up-to-date in regards to his colonoscopy.  All health maintenance issues were addressed.    (K59.01) Slow transit constipation and (R63.5) Weight gain  Comment: Patient has had issues with persistent weight gain over the past 2 years.  He also reports that he has had issues with his bowel movements becoming quite  "infrequent and slower to expel.  He has been taking MiraLAX as suggested by [facility].  Patient states that he did not have any type of diagnostic testing or evaluation completed at that particular visit.  We will have him submit a blood sample for a TSH with reflex free T4 along with a CMP for further evaluation of his symptoms.  We will contact him with results once they are available for review.  We will also ensure that he has no glucose abnormalities on his blood work.  Patient does not currently take any medications that could be contributing to his gastrointestinal symptoms or weight gain.  We did briefly reviewed dietary modifications that can help keep his weight gain under control.    (Z12.5) Screening for prostate cancer  Comment: PSA screen is pending.      Patient has been advised of split billing requirements and indicates understanding: Yes  COUNSELING:  Reviewed preventive health counseling, as reflected in patient instructions    Estimated body mass index is 31.01 kg/m  as calculated from the following:    Height as of 8/25/20: 1.892 m (6' 2.5\").    Weight as of 8/25/20: 111 kg (244 lb 12.8 oz).    Weight management plan: Discussed healthy diet and exercise guidelines    He reports that he has been smoking. He has been smoking about 0.50 packs per day. He has never used smokeless tobacco.  Tobacco Cessation Action Plan:   Information offered: Patient not interested at this time      Appropriate preventive services were discussed with this patient, including applicable screening as appropriate for cardiovascular disease, diabetes, osteopenia/osteoporosis, and glaucoma.  As appropriate for age/gender, discussed screening for colorectal cancer, prostate cancer, breast cancer, and cervical cancer. Checklist reviewing preventive services available has been given to the patient.    Reviewed patients plan of care and provided an AVS. The Basic Care Plan (routine screening as documented in " Health Maintenance) for Guanako meets the Care Plan requirement. This Care Plan has been established and reviewed with the Patient.    Counseling Resources:  ATP IV Guidelines  Pooled Cohorts Equation Calculator  Breast Cancer Risk Calculator  Breast Cancer: Medication to Reduce Risk  FRAX Risk Assessment  ICSI Preventive Guidelines  Dietary Guidelines for Americans, 2010  [employer]s MyPlate  ASA Prophylaxis  Lung CA Screening    Roberto Vega MD  [facility]    Identified Health Risks:  Answers for HPI/ROS submitted by the patient on 12/16/2021  If you checked off any problems, how difficult have these problems made it for you to do your work, take care of things at home, or get along with other people?: Not difficult at all  PHQ9 TOTAL SCORE: 4

## 2021-12-16 NOTE — PATIENT INSTRUCTIONS
Patient Education   Personalized Prevention Plan  You are due for the preventive services outlined below.  Your care team is available to assist you in scheduling these services.  If you have already completed any of these items, please share that information with your care team to update in your medical record.  Health Maintenance Due   Topic Date Due     Zoster (Shingles) Vaccine (1 of 2) Never done     LUNG CANCER SCREENING  Never done     AORTIC ANEURYSM SCREENING (SYSTEM ASSIGNED)  Never done     FALL RISK ASSESSMENT  08/25/2021       Exercise for a Healthier Heart  You may wonder how you can improve the health of your heart. If you re thinking about exercise, you re on the right track. You don t need to become an athlete. But you do need a certain amount of brisk exercise to help strengthen your heart. If you have been diagnosed with a heart condition, your healthcare provider may advise exercise to help stabilize your condition. To help make exercise a habit, choose safe, fun activities.      Exercise with a friend. When activity is fun, you're more likely to stick with it.   Before you start  Check with your healthcare provider before starting an exercise program. This is especially important if you have not been active for a while. It's also important if you have a long-term (chronic) health problem such as heart disease, diabetes, or obesity. Or if you are at high risk for having these problems.   Why exercise?  Exercising regularly offers many healthy rewards. It can help you do all of the following:     Improve your blood cholesterol level to help prevent further heart trouble    Lower your blood pressure to help prevent a stroke or heart attack    Control diabetes, or reduce your risk of getting this disease    Improve your heart and lung function    Reach and stay at a healthy weight    Make your muscles stronger so you can stay active    Prevent falls and fractures by slowing the loss of bone mass  (osteoporosis)    Manage stress better    Reduce your blood pressure    Improve your sense of self and your body image  Exercise tips      Ease into your routine. Set small goals. Then build on them. If you are not sure what your activity level should be, talk with your healthcare provider first before starting an exercise routine.    Exercise on most days. Aim for a total of 150 minutes (2 hours and 30 minutes) or more of moderate-intensity aerobic activity each week. Or 75 minutes (1 hour and 15 minutes) or more of vigorous-intensity aerobic activity each week. Or try for a combination of both. Moderate activity means that you breathe heavier and your heart rate increases but you can still talk. Think about doing 40 minutes of moderate exercise, 3 to 4 times a week. For best results, activity should last for about 40 minutes to lower blood pressure and cholesterol. It's OK to work up to the 40-minute period over time. Examples of moderate-intensity activity are walking 1 mile in 15 minutes. Or doing 30 to 45 minutes of yard work.    Step up your daily activity level.  Along with your exercise program, try being more active the whole day. Walk instead of drive. Or park further away so that you take more steps each day. Do more household tasks or yard work. You may not be able to meet the advised mount of physical activity. But doing some moderate- or vigorous-intensity aerobic activity can help reduce your risk for heart disease. Your healthcare provider can help you figure out what is best for you.    Choose 1 or more activities you enjoy.  Walking is one of the easiest things you can do. You can also try swimming, riding a bike, dancing, or taking an exercise class.    When to call your healthcare provider  Call your healthcare provider if you have any of these:     Chest pain or feel dizzy or lightheaded    Burning, tightness, pressure, or heaviness in your chest, neck, shoulders, back, or arms    Abnormal  shortness of breath    More joint or muscle pain    A very fast or irregular heartbeat (palpitations)  [employer] last reviewed this educational content on 7/1/2019 2000-2021 [employer]. All rights reserved. This information is not intended as a substitute for professional medical care. Always follow your healthcare professional's instructions.          Understanding USDA MyPlate  The USDA has guidelines to help you make healthy food choices. These are called MyPlate. MyPlate shows the food groups that make up healthy meals using the image of a place setting. Before you eat, think about the healthiest choices for what to put on your plate or in your cup or bowl. To learn more about building a healthy plate, visit www.choosemyplate.gov.    The food groups    Fruits. Any fruit or 100% fruit juice counts as part of the Fruit Group. Fruits may be fresh, canned, frozen, or dried, and may be whole, cut-up, or pureed. Make 1/2 of your plate fruits and vegetables.    Vegetables. Any vegetable or 100% vegetable juice counts as a member of the Vegetable Group. Vegetables may be fresh, frozen, canned, or dried. They can be served raw or cooked and may be whole, cut-up, or mashed. Make 1/2 of your plate fruits and vegetables.    Grains. All foods made from grains are part of the Grains Group. These include wheat, rice, oats, cornmeal, and barley. Grains are often used to make foods such as bread, pasta, oatmeal, cereal, tortillas, and grits. Grains should be no more than 1/4 of your plate. At least half of your grains should be whole grains.    Protein. This group includes meat, poultry, seafood, beans and peas, eggs, processed soy products (such as tofu), nuts (including nut butters), and seeds. Make protein choices no more than 1/4 of your plate. Meat and poultry choices should be lean or low fat.    Dairy. The Dairy Group includes all fluid milk products and foods made from milk that contain calcium, such as  yogurt and cheese. (Foods that have little calcium, such as cream, butter, and cream cheese, are not part of this group.) Most dairy choices should be low-fat or fat-free.    Oils. Oils aren't a food group, but they do contain essential nutrients. However it's important to watch your intake of oils. These are fats that are liquid at room temperature. They include canola, corn, olive, soybean, vegetable, and sunflower oil. Foods that are mainly oil include mayonnaise, certain salad dressings, and soft margarines. You likely already get your daily oil allowance from the foods you eat.  Things to limit  Eating healthy also means limiting these things in your diet:       Salt (sodium). Many processed foods have a lot of sodium. To keep sodium intake down, eat fresh vegetables, meats, poultry, and seafood when possible. Purchase low-sodium, reduced-sodium, or no-salt-added food products at the store. And don't add salt to your meals at home. Instead, season them with herbs and spices such as dill, oregano, cumin, and paprika. Or try adding flavor with lemon or lime zest and juice.    Saturated fat. Saturated fats are most often found in animal products such as beef, pork, and chicken. They are often solid at room temperature, such as butter. To reduce your saturated fat intake, choose leaner cuts of meat and poultry. And try healthier cooking methods such as grilling, broiling, roasting, or baking. For a simple lower-fat swap, use plain nonfat yogurt instead of mayonnaise when making potato salad or macaroni salad.    Added sugars. These are sugars added to foods. They are in foods such as ice cream, candy, soda, fruit drinks, sports drinks, energy drinks, cookies, pastries, jams, and syrups. Cut down on added sugars by sharing sweet treats with a family member or friend. You can also choose fruit for dessert, and drink water or other unsweetened beverages.     [employer]Well last reviewed this educational content on  6/1/2020 2000-2021 [employer]. All rights reserved. This information is not intended as a substitute for professional medical care. Always follow your healthcare professional's instructions.

## 2021-12-17 LAB
ALBUMIN SERPL-MCNC: 3.2 G/DL (ref 3.4–5)
ALP SERPL-CCNC: 72 U/L (ref 40–150)
ALT SERPL W P-5'-P-CCNC: 23 U/L (ref 0–70)
ANION GAP SERPL CALCULATED.3IONS-SCNC: 5 MMOL/L (ref 3–14)
AST SERPL W P-5'-P-CCNC: 19 U/L (ref 0–45)
BILIRUB SERPL-MCNC: 0.6 MG/DL (ref 0.2–1.3)
BUN SERPL-MCNC: 21 MG/DL (ref 7–30)
CALCIUM SERPL-MCNC: 8.7 MG/DL (ref 8.5–10.1)
CHLORIDE BLD-SCNC: 106 MMOL/L (ref 94–109)
CO2 SERPL-SCNC: 26 MMOL/L (ref 20–32)
CREAT SERPL-MCNC: 0.95 MG/DL (ref 0.66–1.25)
GFR SERPL CREATININE-BSD FRML MDRD: 83 ML/MIN/1.73M2
GLUCOSE BLD-MCNC: 85 MG/DL (ref 70–99)
POTASSIUM BLD-SCNC: 4.3 MMOL/L (ref 3.4–5.3)
PROT SERPL-MCNC: 7.1 G/DL (ref 6.8–8.8)
PSA SERPL-MCNC: 0.23 UG/L (ref 0–4)
SODIUM SERPL-SCNC: 137 MMOL/L (ref 133–144)
TSH SERPL DL<=0.005 MIU/L-ACNC: 1.14 MU/L (ref 0.4–4)

## 2021-12-17 ASSESSMENT — PATIENT HEALTH QUESTIONNAIRE - PHQ9: SUM OF ALL RESPONSES TO PHQ QUESTIONS 1-9: 4

## 2022-02-16 ENCOUNTER — THERAPY VISIT (OUTPATIENT)
Dept: PHYSICAL THERAPY | Facility: CLINIC | Age: 67
End: 2022-02-16
Payer: COMMERCIAL

## 2022-02-16 DIAGNOSIS — M25.511 ACUTE PAIN OF RIGHT SHOULDER: ICD-10-CM

## 2022-02-16 DIAGNOSIS — S46.011A STRAIN OF RIGHT ROTATOR CUFF CAPSULE: ICD-10-CM

## 2022-02-16 PROCEDURE — 97110 THERAPEUTIC EXERCISES: CPT | Mod: GP | Performed by: PHYSICAL THERAPIST

## 2022-02-16 PROCEDURE — 97161 PT EVAL LOW COMPLEX 20 MIN: CPT | Mod: GP | Performed by: PHYSICAL THERAPIST

## 2022-02-16 NOTE — PROGRESS NOTES
Physical Therapy Initial Evaluation  Subjective:  Pt describes right shoulder weakness with difficulty lifting his arm to shoulder level.  Began insidiously approximately 8 months ago.  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  Did have right sided neck pain originally, but is no longer having this now.  Some right shoulder pain that he relates to weakness.  Saw Dr Sharp, who did the RCR revision in 2018 and was referred to PT on 2/15/22.      The history is provided by the patient.                       Objective:  System                   Shoulder Evaluation:  ROM:  AROM:    Flexion:  Left:  130    Right:  80    Abduction:  Left: 110   Right:  65                      PROM:    Flexion:  Left:  140    Right: 125      Abduction:  Left:  120    Right:  110    Internal Rotation:  Left:  60    Right:  25  External Rotation:  Left:  12    Right:  65                    Strength:    Flexion: Left:5/5   Pain:    Right: 2+/5  Weak/pain free     Pain:     Abduction:  Left: 5/5  Pain:    Right: 2+/5   Weak/pain free    Pain:    Internal Rotation:  Left:5/5     Pain:    Right: 5/5     Pain:  External Rotation:   Left:5-/5     Pain:   Right:4+/5     Pain:        Elbow Flexion:  Left:5/5     Pain:    Right:5/5     Pain:  Elbow Extension:  Left:5/5     Pain:    Right:5/5     Pain:    Special Tests:      Right shoulder positive for the following special tests:Rotator cuff tear  Palpation:  normal                                         General     ROS    Assessment/Plan:    Patient is a 67 year old male with right side shoulder complaints.    Patient has the following significant findings with corresponding treatment plan.                Diagnosis 1:  Right shoulder RTC strain  Pain -  hot/cold therapy and education  Decreased ROM/flexibility - therapeutic exercise and home program  Decreased strength - therapeutic exercise, therapeutic activities and home program    Therapy Evaluation Codes:    1) History comprised of:   Personal factors that impact the plan of care:      None.    Comorbidity factors that impact the plan of care are:      None.     Medications impacting care: None.  2) Examination of Body Systems comprised of:   Body structures and functions that impact the plan of care:      Shoulder.   Activity limitations that impact the plan of care are:      Bathing, Cooking, Dressing and Lifting.  3) Clinical presentation characteristics are:   Stable/Uncomplicated.  4) Decision-Making    Low complexity using standardized patient assessment instrument and/or measureable assessment of functional outcome.  Cumulative Therapy Evaluation is: Low complexity.    Previous and current functional limitations:  (See Goal Flow Sheet for this information)    Short term and Long term goals: (See Goal Flow Sheet for this information)     Communication ability:  Patient appears to be able to clearly communicate and understand verbal and written communication and follow directions correctly.  Treatment Explanation - The following has been discussed with the patient:   RX ordered/plan of care  Anticipated outcomes  Possible risks and side effects  This patient would benefit from PT intervention to resume normal activities.   Rehab potential is fair.    Frequency:  1 X week, once daily  Duration:  for 6 weeks  Discharge Plan:  Achieve all LTG.  Independent in home treatment program.  Reach maximal therapeutic benefit.    Please refer to the daily flowsheet for treatment today, total treatment time and time spent performing 1:1 timed codes.

## 2022-02-16 NOTE — PROGRESS NOTES
[facility]    OUTPATIENT Physical Therapy ORTHOPEDIC EVALUATION  PLAN OF TREATMENT FOR OUTPATIENT REHABILITATION  (COMPLETE FOR INITIAL CLAIMS ONLY)  Patient's Last Name, First Name, M.I.  YOB: 1955  Guanako Padilla    Provider s Name:  [facility]   Medical Record No.  [id number]   Start of Care Date:  02/16/22   Onset Date:   06/16/21   Type:     _X__PT   ___OT Medical Diagnosis:  No diagnosis found.     Treatment Diagnosis:  Right shoulder pain and weakness        Goals:     02/16/22 0500   Body Part   Goals listed below are for Shoulder   Goal #1   Goal #1 reaching   Previous Functional Level No restrictions   Current Functional Level Cannot reach ;to shoulder level;overhead   Performance level 80 degrees flexion   STG Target Performance Reach ;to shoulder level   Performance level 90 degrees flexion   Rationale for dressing;for bathing   Due date 03/16/22   LTG Target Performance Reach;overhead   Performance Level to 100 degrees flexion   Rationale for dressing;for bathing   Due date 04/06/22       Therapy Frequency:  1x/week  Predicted Duration of Therapy Intervention:  6 weeks    Russ Mclain, PT                 I CERTIFY THE NEED FOR THESE SERVICES FURNISHED UNDER        THIS PLAN OF TREATMENT AND WHILE UNDER MY CARE .             Physician Signature               Date    X_____________________________________________________                             Certification Date From:  02/16/22   Certification Date To:  03/29/22    Referring Provider:  No ref. provider found    Initial Assessment        See Epic Evaluation SOC Date: 02/16/22

## 2022-02-17 NOTE — PROGRESS NOTES
[facility]    OUTPATIENT Physical Therapy ORTHOPEDIC EVALUATION  PLAN OF TREATMENT FOR OUTPATIENT REHABILITATION  (COMPLETE FOR INITIAL CLAIMS ONLY)  Patient's Last Name, First Name, M.I.  YOB: 1955  Guanako Padilla    Provider s Name:  [facility]   Medical Record No.  [id number]   Start of Care Date:  02/16/22   Onset Date:   06/16/21   Type:     _X__PT   ___OT Medical Diagnosis:    Encounter Diagnoses   Name Primary?     Acute pain of right shoulder      Strain of right rotator cuff capsule         Treatment Diagnosis:  Right shoulder pain and weakness        Goals:     02/16/22 0500   Body Part   Goals listed below are for Shoulder   Goal #1   Goal #1 reaching   Previous Functional Level No restrictions   Current Functional Level Cannot reach ;to shoulder level;overhead   Performance level 80 degrees flexion   STG Target Performance Reach ;to shoulder level   Performance level 90 degrees flexion   Rationale for dressing;for bathing   Due date 03/16/22   LTG Target Performance Reach;overhead   Performance Level to 100 degrees flexion   Rationale for dressing;for bathing   Due date 04/06/22       Therapy Frequency:  1x/week  Predicted Duration of Therapy Intervention:  6 weeks    Russ Mclain, PT                 I CERTIFY THE NEED FOR THESE SERVICES FURNISHED UNDER        THIS PLAN OF TREATMENT AND WHILE UNDER MY CARE .             Physician Signature               Date    X_____________________________________________________                             Certification Date From:  02/16/22   Certification Date To:  03/29/22    Referring Provider:  Troy Sharp    Initial Assessment        See Epic Evaluation SOC Date: 02/16/22

## 2022-02-17 NOTE — PROGRESS NOTES
[facility]    OUTPATIENT Physical Therapy ORTHOPEDIC EVALUATION  PLAN OF TREATMENT FOR OUTPATIENT REHABILITATION  (COMPLETE FOR INITIAL CLAIMS ONLY)  Patient's Last Name, First Name, M.I.  YOB: 1955  Guanako Padilla    Provider s Name:  [facility]   Medical Record No.  [id number]   Start of Care Date:  02/16/22   Onset Date:   06/16/21   Type:     _X__PT   ___OT Medical Diagnosis:    Encounter Diagnoses   Name Primary?     Acute pain of right shoulder      Strain of right rotator cuff capsule         Treatment Diagnosis:  Right shoulder pain and weakness        Goals:     02/16/22 0500   Body Part   Goals listed below are for Shoulder   Goal #1   Goal #1 reaching   Previous Functional Level No restrictions   Current Functional Level Cannot reach ;to shoulder level;overhead   Performance level 80 degrees flexion   STG Target Performance Reach ;to shoulder level   Performance level 90 degrees flexion   Rationale for dressing;for bathing   Due date 03/16/22   LTG Target Performance Reach;overhead   Performance Level to 100 degrees flexion   Rationale for dressing;for bathing   Due date 04/06/22       Therapy Frequency:  1x/week  Predicted Duration of Therapy Intervention:  6 weeks    Russ Mclain, PT                 I CERTIFY THE NEED FOR THESE SERVICES FURNISHED UNDER        THIS PLAN OF TREATMENT AND WHILE UNDER MY CARE .             Physician Signature               Date    X_____________________________________________________                             Certification Date From:  02/16/22   Certification Date To:  03/29/22    Referring Provider:  No ref. provider found    Initial Assessment        See Epic Evaluation SOC Date: 02/16/22

## 2022-02-24 ENCOUNTER — THERAPY VISIT (OUTPATIENT)
Dept: PHYSICAL THERAPY | Facility: CLINIC | Age: 67
End: 2022-02-24
Payer: COMMERCIAL

## 2022-02-24 DIAGNOSIS — S46.011A STRAIN OF RIGHT ROTATOR CUFF CAPSULE: ICD-10-CM

## 2022-02-24 DIAGNOSIS — M25.511 ACUTE PAIN OF RIGHT SHOULDER: ICD-10-CM

## 2022-02-24 PROCEDURE — 97110 THERAPEUTIC EXERCISES: CPT | Mod: GP | Performed by: PHYSICAL THERAPIST

## 2022-02-24 PROCEDURE — 97010 HOT OR COLD PACKS THERAPY: CPT | Mod: GP | Performed by: PHYSICAL THERAPIST

## 2022-03-03 ENCOUNTER — THERAPY VISIT (OUTPATIENT)
Dept: PHYSICAL THERAPY | Facility: CLINIC | Age: 67
End: 2022-03-03
Payer: COMMERCIAL

## 2022-03-03 DIAGNOSIS — M25.511 ACUTE PAIN OF RIGHT SHOULDER: ICD-10-CM

## 2022-03-03 DIAGNOSIS — S46.011A STRAIN OF RIGHT ROTATOR CUFF CAPSULE: ICD-10-CM

## 2022-03-03 PROCEDURE — 97010 HOT OR COLD PACKS THERAPY: CPT | Mod: GP | Performed by: PHYSICAL THERAPIST

## 2022-03-03 PROCEDURE — 97110 THERAPEUTIC EXERCISES: CPT | Mod: GP | Performed by: PHYSICAL THERAPIST

## 2022-03-10 ENCOUNTER — THERAPY VISIT (OUTPATIENT)
Dept: PHYSICAL THERAPY | Facility: CLINIC | Age: 67
End: 2022-03-10
Payer: COMMERCIAL

## 2022-03-10 DIAGNOSIS — M25.511 ACUTE PAIN OF RIGHT SHOULDER: ICD-10-CM

## 2022-03-10 DIAGNOSIS — S46.011A STRAIN OF RIGHT ROTATOR CUFF CAPSULE: ICD-10-CM

## 2022-03-10 PROCEDURE — 97010 HOT OR COLD PACKS THERAPY: CPT | Mod: GP | Performed by: PHYSICAL THERAPIST

## 2022-03-10 PROCEDURE — 97110 THERAPEUTIC EXERCISES: CPT | Mod: GP | Performed by: PHYSICAL THERAPIST

## 2022-03-16 ENCOUNTER — THERAPY VISIT (OUTPATIENT)
Dept: PHYSICAL THERAPY | Facility: CLINIC | Age: 67
End: 2022-03-16
Payer: COMMERCIAL

## 2022-03-16 DIAGNOSIS — M25.511 ACUTE PAIN OF RIGHT SHOULDER: ICD-10-CM

## 2022-03-16 DIAGNOSIS — S46.011A STRAIN OF RIGHT ROTATOR CUFF CAPSULE: ICD-10-CM

## 2022-03-16 PROCEDURE — 97110 THERAPEUTIC EXERCISES: CPT | Mod: GP | Performed by: PHYSICAL THERAPIST

## 2022-03-16 PROCEDURE — 97010 HOT OR COLD PACKS THERAPY: CPT | Mod: GP | Performed by: PHYSICAL THERAPIST

## 2022-03-24 ENCOUNTER — THERAPY VISIT (OUTPATIENT)
Dept: PHYSICAL THERAPY | Facility: CLINIC | Age: 67
End: 2022-03-24
Payer: COMMERCIAL

## 2022-03-24 DIAGNOSIS — S46.011A STRAIN OF RIGHT ROTATOR CUFF CAPSULE: ICD-10-CM

## 2022-03-24 DIAGNOSIS — M25.511 ACUTE PAIN OF RIGHT SHOULDER: ICD-10-CM

## 2022-03-24 PROCEDURE — 97110 THERAPEUTIC EXERCISES: CPT | Mod: GP | Performed by: PHYSICAL THERAPIST

## 2022-03-24 PROCEDURE — 97010 HOT OR COLD PACKS THERAPY: CPT | Mod: GP | Performed by: PHYSICAL THERAPIST

## 2022-04-07 ENCOUNTER — THERAPY VISIT (OUTPATIENT)
Dept: PHYSICAL THERAPY | Facility: CLINIC | Age: 67
End: 2022-04-07
Payer: COMMERCIAL

## 2022-04-07 DIAGNOSIS — S46.011A STRAIN OF RIGHT ROTATOR CUFF CAPSULE: ICD-10-CM

## 2022-04-07 DIAGNOSIS — M25.511 ACUTE PAIN OF RIGHT SHOULDER: ICD-10-CM

## 2022-04-07 PROCEDURE — 97110 THERAPEUTIC EXERCISES: CPT | Mod: GP | Performed by: PHYSICAL THERAPIST

## 2022-04-07 NOTE — PROGRESS NOTES
Subjective:  HPI  Physical Exam                    Objective:  System    Physical Exam    General     ROS    Assessment/Plan:    DISCHARGE REPORT    Progress reporting period is from 2/16/22 to 4/7/22 (7 visits).       SUBJECTIVE  Subjective changes noted by patient:    Subjective: Galen is no longer having pain in the right shoulder, but he is not able to lift his arm any better.  He feels stronger with some exercises for his shoulder, but not with his ability to lift his right arm.      Current pain level is 1/10  .     Previous pain level was   Initial Pain level: 3/10.   Changes in function:  Yes (See Goal flowsheet attached for changes in current functional level)  Adverse reaction to treatment or activity: None    OBJECTIVE  Changes noted in objective findings:    Objective: AROM: Flex=80, Abd-70.  MMT: Flex and abd=2/5, ER=4-/5, all others are strong.       ASSESSMENT/PLAN  Updated problem list and treatment plan: Diagnosis 1:  Right shoulder weakness (suspected RTC tear)    STG/LTGs have been met or progress has been made towards goals:  Yes (See Goal flow sheet completed today.)  Assessment of Progress: The patient's progress has plateaued.  Self Management Plans:  Patient is independent in a home treatment program.    Guanako continues to require the following intervention to meet STG and LTG's:  PT intervention is no longer required to meet STG/LTG.    Recommendations:  This patient is ready to be discharged from therapy and continue their home treatment program.  I have advised Galen to follow up with Dr Sharp as his ability to lift his arm is not improving.    Please refer to the daily flowsheet for treatment today, total treatment time and time spent performing 1:1 timed codes.

## 2022-04-07 NOTE — LETTER
[facility]  [id number]  [facility] [address]  [phone]    2022    Re: Guanako Padilla   :   1955  MRN:  [id number]   REFERRING PHYSICIAN:   Troy BERNAL [facility]  Date of Initial Evaluation:  2022  Visits:  Rxs Used: 7  Reason for Referral:     Acute pain of right shoulder  Strain of right rotator cuff capsule    DISCHARGE REPORT  Progress reporting period is from 22 to 22 (7 visits).       SUBJECTIVE  Subjective changes noted by patient:    Subjective: aGlen is no longer having pain in the right shoulder, but he is not able to lift his arm any better.  He feels stronger with some exercises for his shoulder, but not with his ability to lift his right arm.      Current pain level is 1/10.     Previous pain level was   Initial Pain level: 3/10.   Changes in function:  Yes (See Goal flowsheet attached for changes in current functional level)  Adverse reaction to treatment or activity: None    OBJECTIVE  Changes noted in objective findings:    Objective: AROM: Flex=80, Abd-70.  MMT: Flex and abd=2/5, ER=4-/5, all others are strong.       ASSESSMENT/PLAN  Updated problem list and treatment plan: Diagnosis 1:  Right shoulder weakness (suspected RTC tear)    STG/LTGs have been met or progress has been made towards goals:  Yes (See Goal flow sheet completed today.)  Assessment of Progress: The patient's progress has plateaued.  Self Management Plans:  Patient is independent in a home treatment program.    Guanako continues to require the following intervention to meet STG and LTG's:  PT intervention is no longer required to meet STG/LTG.    Recommendations:  This patient is ready to be discharged from therapy and continue their home treatment program.  I have advised Galen to follow up with Dr Sharp as his ability to lift his arm is not improving.              Thank you for your  referral.    INQUIRIES  Therapist: Russ Mclain PT  [facility]  [id number] [place] [id number]  Phone: [phone]  Fax: [phone]

## 2022-04-27 NOTE — PROGRESS NOTES
"Galen is a 67 year old who is being evaluated via a billable video visit.      How would you like to obtain your AVS? MyChart  If the video visit is dropped, the invitation should be resent by: Text to cell phone  Will anyone else be joining your video visit? Yes: spouse. How would they like to receive their invitation?       Video Start Time: 7:07 AM    Assessment & Plan   Problem List Items Addressed This Visit    None     Visit Diagnoses     Clinical diagnosis of COVID-19    -  Primary    Relevant Medications    nirmatrelvir and ritonavir (PAXLOVID) therapy pack             I spent a total of 15 minutes on the day of the visit.   Time spent doing chart review, history and exam, documentation and further activities per the note       COVID-19 positive patient.  Encounter for consideration of medication intervention. Patient does qualify for a prescription. Full discussion with patient including medication options, risks and benefits. Potential drug interactions reviewed with patient.   Treatment Planned Paxlovid, Rx sent to [facility] [phone]    [address] [place] [address], [address] [address]    Hours:  Mon-Fri: 8:00a - 6:00p  Sat-Sun: 8:00a - 4:00p    Drive-thru services available.  Temporary change to home medications: None   None     Estimated body mass index is 32.12 kg/m  as calculated from the following:    Height as of 12/16/21: 1.905 m (6' 3\").    Weight as of 12/16/21: 116.6 kg (257 lb).  GFR Estimate   Date Value Ref Range Status   12/16/2021 83 >60 mL/min/1.73m2 Final     Comment:     As of July 11, 2021, eGFR is calculated by the CKD-EPI creatinine equation, without race adjustment. eGFR can be influenced by muscle mass, exercise, and diet. The reported eGFR is an estimation only and is only applicable if the renal function is stable.   03/19/2020 >60 >60 ml/min/1.73m2 Final     No results found for: [id number]    No follow-ups on file.    Delia Bravo NP  M " [facility] NAHOMI Aaron is a 67 year old who presents for the following health issues     HPI       COVID-19 Symptom Review  How many days ago did these symptoms start? 4/25/2022. lpositive 4.25.22    Are any of the following symptoms significant for you?    New or worsening difficulty breathing? No    Worsening cough? Yes, it's a dry cough     Fever or chills? No    Headache: YES    Sore throat: YES    Chest pain: no    Diarrhea: no    Body aches? YES     What treatments has patient tried? Advil and cough drops    Does patient live in a nursing home, group home, or shelter? no  Does patient have a way to get food/medications during quarantined? Yes, I have a friend or family member who can help me.                 MASSBP Score 4/28/2022   Age Greater than or equal to 65 years 2   BMI greater than or equal to 35 kg/m2 0   Has Diabetes Mellitus 0   Has Chronic Kidney Disease 0   Has Cardiovascular Disease and 55 years or older 0   Has Chronic Respiratory Disease and 55 years or older 0   Has Hypertension and 55 years or older 0   Is Immunocompromised 0   Is Pregnant 0   Member of BIPOC community (Black/, /, ,  or , or  or [place] Native)  0   MASSBP Score 2   Has the patient had a positive COVID test outside our system?  Yes   What day did symptoms start?  4/26/2022         Review of Systems   Constitutional, HEENT, cardiovascular, pulmonary, GI, , musculoskeletal, neuro, skin, endocrine and psych systems are negative, except as otherwise noted.      Objective           Vitals:  No vitals were obtained today due to virtual visit.    Physical Exam   GENERAL: Healthy, alert and no distress  EYES: Eyes grossly normal to inspection.  No discharge or erythema, or obvious scleral/conjunctival abnormalities.  RESP: No audible wheeze, cough, or visible cyanosis.  No visible retractions or increased work of  breathing.    SKIN: Visible skin clear. No significant rash, abnormal pigmentation or lesions.  NEURO: Cranial nerves grossly intact.  Mentation and speech appropriate for age.  PSYCH: Mentation appears normal, affect normal/bright, judgement and insight intact, normal speech and appearance well-groomed.                Video-Visit Details    Type of service:  Video Visit    Video End Time:7:13 AM    Originating Location (pt. Location): Home    Distant Location (provider location):  [facility]     Platform used for Video Visit: [employer]

## 2022-04-28 ENCOUNTER — VIRTUAL VISIT (OUTPATIENT)
Dept: INTERNAL MEDICINE | Facility: CLINIC | Age: 67
End: 2022-04-28
Payer: COMMERCIAL

## 2022-04-28 DIAGNOSIS — U07.1 CLINICAL DIAGNOSIS OF COVID-19: Primary | ICD-10-CM

## 2022-04-28 PROCEDURE — 99213 OFFICE O/P EST LOW 20 MIN: CPT | Mod: 95 | Performed by: NURSE PRACTITIONER

## 2022-11-15 ENCOUNTER — APPOINTMENT (OUTPATIENT)
Dept: GENERAL RADIOLOGY | Facility: OTHER | Age: 67
End: 2022-11-15
Attending: FAMILY MEDICINE
Payer: COMMERCIAL

## 2022-11-15 ENCOUNTER — HOSPITAL ENCOUNTER (EMERGENCY)
Facility: OTHER | Age: 67
Discharge: HOME | End: 2022-11-15
Attending: FAMILY MEDICINE | Admitting: FAMILY MEDICINE
Payer: COMMERCIAL

## 2022-11-15 VITALS
OXYGEN SATURATION: 98 % | RESPIRATION RATE: 16 BRPM | SYSTOLIC BLOOD PRESSURE: 137 MMHG | DIASTOLIC BLOOD PRESSURE: 81 MMHG | HEART RATE: 82 BPM | TEMPERATURE: 97.6 F | BODY MASS INDEX: 31 KG/M2 | WEIGHT: 248 LBS

## 2022-11-15 DIAGNOSIS — M25.552 HIP PAIN, LEFT: ICD-10-CM

## 2022-11-15 PROCEDURE — 99283 EMERGENCY DEPT VISIT LOW MDM: CPT | Performed by: FAMILY MEDICINE

## 2022-11-15 PROCEDURE — 73502 X-RAY EXAM HIP UNI 2-3 VIEWS: CPT

## 2022-11-15 NOTE — ED PROVIDER NOTES
History     Chief Complaint   Patient presents with     Fall     Hip Pain     The history is provided by the patient.     Guanako Padilla is a 67 year old male who has left hip pain. He slipped walking down a hill yesterday, and fell onto his left hip. He feels pretty good unless he twists to the left. If he rotates his feet and then steps or crouches down he does okay but if he leaves his feet planted and twists to the left he has pain. No other injury. No prior surgery on this hip.     Allergies:  No Known Allergies    Problem List:    Patient Active Problem List    Diagnosis Date Noted     Slow transit constipation 08/25/2020     Priority: Medium     Benign prostatic hyperplasia with urinary hesitancy 08/25/2020     Priority: Medium     Smoking 08/25/2020     Priority: Medium     H/O shoulder surgery 04/30/2019     Priority: Medium     Chronic right shoulder pain 02/26/2019     Priority: Medium     Incomplete tear of right rotator cuff 01/24/2019     Priority: Medium     Arthritis of elbow 11/16/2018     Priority: Medium     Peptic ulcer 04/25/2016     Priority: Medium     Primary osteoarthritis of both knees 04/25/2016     Priority: Medium     Iron deficiency anemia due to chronic blood loss 04/25/2016     Priority: Medium     Bilateral carpal tunnel syndrome 01/25/2016     Priority: Medium     Pseudogout of left knee 07/28/2015     Priority: Medium     CARDIOVASCULAR SCREENING; LDL GOAL LESS THAN 160 10/31/2010     Priority: Medium     Internal derangement of shoulder 09/21/2010     Priority: Medium     Shoulder impingement syndrome 06/16/2010     Priority: Medium     Disorder of 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 Covarrubias [facility]     COLONOSCOPY  1/8/2016    Procedure: COLONOSCOPY;  Surgeon: Juan Covarrubias MD;  Location:  GI     ESOPHAGOSCOPY, GASTROSCOPY, DUODENOSCOPY (EGD), COMBINED  1/8/2016    Dr. Satish GARCIA     ESOPHAGOSCOPY, GASTROSCOPY, DUODENOSCOPY (EGD), COMBINED N/A 1/8/2016    Procedure: COMBINED ESOPHAGOSCOPY, GASTROSCOPY, DUODENOSCOPY (EGD), BIOPSY SINGLE OR MULTIPLE;  Surgeon: Juan Covarrubias MD;  Location:  GI     ORTHOPEDIC SURGERY Left 1976    left shoulder, rotator cuff repair     ORTHOPEDIC SURGERY Right 1980    wrist tendon repair     ORTHOPEDIC SURGERY Right 2011    right foot fx-metal in foot-ORIF     ORTHOPEDIC SURGERY Right 1981    Right elbow, dx \"little league elbow\", it was cleaned out     ORTHOPEDIC SURGERY Right 2008    Right shoulder, rotator cuff repair       Family History:    Family History   Problem Relation Age of Onset     Alzheimer Disease Mother         Dementia     Cerebrovascular Disease Father      Cancer Maternal Grandfather      Colon Cancer No family hx of        Social History:  Marital Status:   [2]  Social History     Tobacco Use     Smoking status: Former     Packs/day: 0.50     Types: Cigarettes     Smokeless tobacco: Never   Substance Use Topics     Alcohol use: Yes     Alcohol/week: 0.0 standard drinks     Comment: 1 1/2 beers per day     Drug use: No        Medications:    Cholecalciferol (VITAMIN D3 PO)  magnesium oxide 400 MG CAPS  omeprazole (PRILOSEC) 20 MG DR capsule          Review of Systems   Musculoskeletal:        Left hip pain   All other systems reviewed and are negative.      Physical Exam   BP: 139/84  Pulse: 78  Temp: 97.6  F (36.4  C)  Resp: 16  Weight: 112.5 kg (248 lb)  SpO2: 98 %      Physical Exam  Vitals and nursing note reviewed.   Constitutional:       General: He is not in acute distress.     Appearance: Normal appearance. He is normal weight. He is not " ill-appearing, toxic-appearing or diaphoretic.   Musculoskeletal:      Comments: He has tenderness of the left hip   Neurological:      Mental Status: He is alert.         Results for orders placed or performed during the hospital encounter of 11/15/22 (from the past 24 hour(s))   XR Hip Left 2-3 Views    Narrative    PROCEDURE: XR HIP LEFT 2-3 VIEWS 11/15/2022 9:52 AM    HISTORY: fall yesterday with left hip pain    COMPARISONS: None.    TECHNIQUE: AP and lateral views.    FINDINGS: There is moderate degenerative change in the left hip.    No fracture or dislocation is seen. There is no focal bone lesion.    There is some degenerative change in the left sacroiliac joint.         Impression    IMPRESSION: No acute fracture.    MELINA NASH MD         SYSTEM ID:  [id number]       Assessments & Plan (with Medical Decision Making)  Guanako Padilla is a 67 year old male who has left hip pain. He slipped in the parking lot last night and fell onto his left hip. He feels pretty good unless he twists to the left. If he rotates his feet and then steps or crouches down he does okay but if he leaves his feet planted and twists to the left he has pain. No other injury. No prior surgery on this hip.  VS in the ED /84   Pulse 78   Temp 97.6  F (36.4  C)   Resp 16   Wt 112.5 kg (248 lb)   SpO2 98%   BMI 31.00 kg/m    He has left hip pain. He is able to walk on it but with any twisting he has pain. He declined pain medicine.  Xrays negative.  He may need MRI at some point if this is persistently painful but I doubt that will be needed given mechanism and the fact that he is still walking.      I have reviewed the nursing notes.    I have reviewed the findings, diagnosis, plan and need for follow up with the patient    Final diagnoses:   Hip pain, left       11/15/2022   [facility] AND [facility], Gianni Long MD  11/15/22 1016

## 2022-11-15 NOTE — ED TRIAGE NOTES
Pt arrives with c/o falling while walking up a hill this morning. States that he did not hit his head, but he is now having left hip pain. Denies any pain relieving medications this morning.      Triage Assessment     Row Name 11/15/22 2920       Triage Assessment (Adult)    Airway WDL WDL       Respiratory WDL    Respiratory WDL WDL       Skin Circulation/Temperature WDL    Skin Circulation/Temperature WDL WDL       Cardiac WDL    Cardiac WDL WDL       Peripheral/Neurovascular WDL    Peripheral Neurovascular WDL WDL       Cognitive/Neuro/Behavioral WDL    Cognitive/Neuro/Behavioral WDL WDL

## 2022-12-20 ENCOUNTER — HOSPITAL ENCOUNTER (EMERGENCY)
Facility: CLINIC | Age: 67
Discharge: HOME | End: 2022-12-20
Attending: EMERGENCY MEDICINE | Admitting: EMERGENCY MEDICINE
Payer: OTHER MISCELLANEOUS

## 2022-12-20 ENCOUNTER — APPOINTMENT (OUTPATIENT)
Dept: GENERAL RADIOLOGY | Facility: CLINIC | Age: 67
End: 2022-12-20
Attending: EMERGENCY MEDICINE
Payer: OTHER MISCELLANEOUS

## 2022-12-20 ENCOUNTER — APPOINTMENT (OUTPATIENT)
Dept: CT IMAGING | Facility: CLINIC | Age: 67
End: 2022-12-20
Attending: EMERGENCY MEDICINE
Payer: OTHER MISCELLANEOUS

## 2022-12-20 VITALS
OXYGEN SATURATION: 96 % | TEMPERATURE: 96.9 F | WEIGHT: 245 LBS | DIASTOLIC BLOOD PRESSURE: 81 MMHG | RESPIRATION RATE: 18 BRPM | HEART RATE: 88 BPM | HEIGHT: 74 IN | SYSTOLIC BLOOD PRESSURE: 146 MMHG | BODY MASS INDEX: 31.44 KG/M2
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
## 2022-12-20 DIAGNOSIS — S01.01XA LACERATION OF SCALP, INITIAL ENCOUNTER: ICD-10-CM

## 2022-12-20 DIAGNOSIS — S16.1XXA STRAIN OF NECK MUSCLE, INITIAL ENCOUNTER: ICD-10-CM

## 2022-12-20 PROCEDURE — 70450 CT HEAD/BRAIN W/O DYE: CPT

## 2022-12-20 PROCEDURE — 99285 EMERGENCY DEPT VISIT HI MDM: CPT | Mod: 25

## 2022-12-20 PROCEDURE — 72125 CT NECK SPINE W/O DYE: CPT

## 2022-12-20 PROCEDURE — 71046 X-RAY EXAM CHEST 2 VIEWS: CPT

## 2022-12-20 PROCEDURE — 12002 RPR S/N/AX/GEN/TRNK2.6-7.5CM: CPT

## 2022-12-20 RX ORDER — LIDOCAINE HYDROCHLORIDE AND EPINEPHRINE 10; 10 MG/ML; UG/ML
INJECTION, SOLUTION INFILTRATION; PERINEURAL
Status: DISCONTINUED
Start: 2022-12-20 | End: 2022-12-20 | Stop reason: HOSPADM

## 2022-12-20 ASSESSMENT — ENCOUNTER SYMPTOMS
ARTHRALGIAS: 1
HEADACHES: 0
SHORTNESS OF BREATH: 0
WOUND: 1
DIFFICULTY URINATING: 0
NUMBNESS: 0
NECK PAIN: 1
DYSURIA: 0
ABDOMINAL PAIN: 0
HEMATURIA: 0
WEAKNESS: 0

## 2022-12-20 ASSESSMENT — ACTIVITIES OF DAILY LIVING (ADL): ADLS_ACUITY_SCORE: 35

## 2022-12-20 NOTE — Clinical Note
Guanako Padilla was seen and treated in our emergency department on 12/20/2022.  He may return to work on 12/21/2022.       If you have any questions or concerns, please don't hesitate to call.      Bo Irvin MD

## 2022-12-20 NOTE — ED PROVIDER NOTES
History   Chief Complaint:  Fall and Head Laceration       The history is provided by the patient.      Guanako Padilla is an otherwise healthy 67 year old male who presents for evaluation after a fall with a head laceration. The patient states that he slipped on the ice and fell backwards, hitting his head. He reports having a laceration to the back of his head as well as pain in the right side of his neck and right shoulder, but he denies any loss of consciousness. He does report having some problems with his short term memory after the fall, but this has now resolved. He denies having pain in the middle of his neck or back. He denies numbness or pain shooting down his arms. No numbness or weakness in the extremities. He states that he was able to stand up and bear weight after the fall, and denies any pain other than in his neck and shoulder. He denies having a headache, vision changes, hearing changes, chest pain, abdominal pain, shortness of breath, or new urinary symptoms. He states that he is otherwise healthy and not on blood thinners.     Review of Systems   HENT: Negative for hearing loss.    Eyes: Negative for visual disturbance.   Respiratory: Negative for shortness of breath.    Cardiovascular: Negative for chest pain.   Gastrointestinal: Negative for abdominal pain.   Genitourinary: Negative for decreased urine volume, difficulty urinating, dysuria, hematuria and urgency.   Musculoskeletal: Positive for arthralgias (R shoulder) and neck pain.   Skin: Positive for wound.   Neurological: Negative for syncope, weakness, numbness and headaches.   All other systems reviewed and are negative.        Allergies:  The patient has no known allergies.     Medications:  Omeprazole  Magnesium oxide    Past Medical History:     Shoulder impingement syndrome  Internal derangement of shoulder  Pseudogout of left knee  Peptic ulcer  Primary osteoarthritis of both knees  Iron deficiency anemia due to chronic blood  "loss  Arthritis of elbow  Incomplete tear of right rotator cuff  Slow transit constipation  Benign prostatic hyperplasia with urinary hesitancy  Bilateral carpal tunnel syndrome  Chronic right shoulder pain  GERD (gastroesophageal reflux disease)  Obesity  PONV (postoperative nausea and vomiting)    Past Surgical History:    Colonoscopy x2  Esophagogastroduodenoscopy  x2  Left shoulder rotator cuff repair  Wrist tendon repair  Right foot ORIF  Right elbow irrigation and debridement  Right shoulder, rotator cuff repair     Family History:    Mother: dementia  Father: cerebrovascular disease    Social History:  The patient presents to the ED unaccompanied  Presents via EMS  PCP: Gricel Woo     Physical Exam     Patient Vitals for the past 24 hrs:   BP Temp Temp src Pulse Resp SpO2 Height Weight   12/20/22 0958 (!) 146/81 96.9  F (36.1  C) Oral 88 18 96 % 1.88 m (6' 2\") 111.1 kg (245 lb)       Physical Exam  General: Sitting on the ED bed, no distress  HEENT: Normocephalic, atraumatic  Cardiac: Radial pulses 2+, regular rate and rhythm  Pulm: Breathing comfortably, no accessory muscle usage, no conversational dyspnea, and lungs clear bilaterally  GI: Abdomen soft, nontender, no rigidity or guarding  MSK: Midline C/T/L-spine nontender to palpation, no step-offs, no external evidence of trauma.  Anterior chest wall and posterior thorax nontender to palpation and no external trauma.  Extremities x4 without bony deformity, no instability, tenderness to palpation, or painful range of motion.  Skin: Warm and dry, 6 centimeter T-shaped laceration over the left occipital scalp hemostatic  Neuro: Sensorimotor intact extremities x4  Psych: Normal mood and affect    Emergency Department Course     Imaging:  XR Chest 2 Views   Preliminary Result   IMPRESSION: No acute cardiopulmonary disease.       CT Cervical Spine w/o Contrast   Final Result   IMPRESSION: Mild degenerative anterolisthesis of C2 upon C3 and C4   upon C5. Mild " degenerative retrolisthesis of C3 upon C4 and C5 upon   C6. Alignment of the cervical vertebrae is otherwise normal. Vertebral   body heights of the cervical spine are normal. Craniocervical   alignment is normal. There are no fractures of the cervical spine.    Mild-moderate facet arthropathy throughout the cervical spine. No   spinal canal stenosis. No prevertebral soft tissue swelling.      Radiation dose for this scan was reduced using automated exposure   control, adjustment of the mA and/or kV according to patient size, or   iterative reconstruction technique.        RUSS ROSS MD            SYSTEM ID:  [id number]      CT Head w/o Contrast   Final Result   IMPRESSION: Small high posterior left parietal scalp   hematoma/laceration. Diffuse cerebral volume loss and cerebral white   matter changes consistent with chronic small vessel ischemic disease.   No evidence for acute intracranial pathology.          Radiation dose for this scan was reduced using automated exposure   control, adjustment of the mA and/or kV according to patient size, or   iterative reconstruction technique.      RUSS ROSS MD            SYSTEM ID:  [id number]        Report per radiology    Procedures    Laceration Repair      Procedure: Laceration Repair    Indication: Laceration    Consent: Verbal    Location: Posterior Scalp     Length: 6 cm    Preparation: Irrigation with Sterile Saline.    Anesthesia/Sedation: Lidocaine with Epinephrine - 1%      Treatment/Exploration: Wound explored, no foreign bodies found     Closure: The wound was closed with 12 staples.    Patient Status: The patient tolerated the procedure well: Yes. There were no complications.    Emergency Department Course:           Reviewed:  I reviewed nursing notes, vitals, past medical history and Care Everywhere    Assessments:  1008 I obtained history and examined the patient as noted above.   1157 I rechecked the patient and explained findings.      Disposition:  The patient was discharged to home.     Impression & Plan     Medical Decision Makin-year-old male presents with mechanical fall on ice with scalp laceration as above.  He is neurologically intact on exam, no acute findings on primary survey.  Given his amnesia surrounding the events right-sided neck pain, CT of the head and neck was obtained and showed no acute findings.  His laceration was repaired as above.  Plan is for discharge home with PCP follow-up for follow-up care and staple removal.  A primary care referral was placed as the patient states he does not currently have primary care.  He was advised that he can return to the ED for staple removal if he is unable to do so in the primary care office.    Diagnosis:    ICD-10-CM    1. Fall due to slipping on ice or snow, initial encounter  W00.9XXA       2. Laceration of scalp, initial encounter  S01.01XA Primary Care Referral      3. Strain of neck muscle, initial encounter  S16.1XXA             Scribe Disclosure:  DIANE, Lacie Tony, am serving as a scribe at 9:55 AM on 2022 to document services personally performed by Bo Irvin MD based on my observations and the provider's statements to me.            Bo Irvin MD  [phone]

## 2022-12-27 ENCOUNTER — OFFICE VISIT (OUTPATIENT)
Dept: FAMILY MEDICINE | Facility: CLINIC | Age: 67
End: 2022-12-27
Payer: COMMERCIAL

## 2022-12-27 VITALS
HEART RATE: 79 BPM | HEIGHT: 74 IN | OXYGEN SATURATION: 99 % | WEIGHT: 252.6 LBS | BODY MASS INDEX: 32.42 KG/M2 | DIASTOLIC BLOOD PRESSURE: 80 MMHG | TEMPERATURE: 97.9 F | SYSTOLIC BLOOD PRESSURE: 123 MMHG

## 2022-12-27 DIAGNOSIS — Z48.02: Primary | ICD-10-CM

## 2022-12-27 PROCEDURE — 99207 PR NON-BILLABLE SERV PER CHARTING: CPT | Mod: 25

## 2022-12-27 NOTE — PROGRESS NOTES
"  Assessment & Plan     (Z48.02) Encounter for removal of staples  (primary encounter diagnosis)  Comment: 12 staples noted on exam. Patient site prepped with alcohol pad, staple removal kit utilized to remove all staples. Patient tolerated well. All 12 staples accounted for. Incision well approximated, no signs of infection noted or any dehiscence. Patient educated on proper skin care. Patient wound covered with bandage with bacitracin. Patient educated on signs and symptoms of infection and when to present to the er if neurologic red flag symptoms occur. Patient agrees with plan of care and will follow up as needed unless acute concerns arise in the meantime.   Plan: STAPLE/SUTURE REMOVAL.          BMI:   Estimated body mass index is 32.43 kg/m  as calculated from the following:    Height as of this encounter: 1.88 m (6' 2\").    Weight as of this encounter: 114.6 kg (252 lb 9.6 oz).     No follow-ups on file.    [facility]    Anthony Aaron is a 67 year old, presenting for the following health issues:  Staple Removal      HPI     Pt being seen today to have Staples removed from a fall on the ice back on 12/20/22.     -Crossing road waiting for bus and feels he may have slipped on piece of paper underneath the snow on the ice  -Didn't lose consciousness, was having memory issues for a bit  -No memory issues since injury  -Sore from neck down to waist  -12 staples placed at ER visit.  -Denies visual disturbances, dizziness, lightheadedness, syncope, headaches, weakness, numbness/tingling, fever, chills, myalgias, n/v,     Review of Systems   Constitutional, HEENT, musculoskeletal, neuro, skin, and psych systems are negative, except as otherwise noted.      Objective    /80 (BP Location: Right arm, Patient Position: Sitting, Cuff Size: Adult Large)   Pulse 79   Temp 97.9  F (36.6  C) (Oral)   Ht 1.88 m (6' 2\")   Wt 114.6 kg (252 lb 9.6 oz)   SpO2 99%   " BMI 32.43 kg/m    Body mass index is 32.43 kg/m .  Physical Exam  Constitutional:       Appearance: Normal appearance.   HENT:      Head: Laceration present.        Comments: Laceration to occiput: 12 intact staples. No signs of erythema, redness, swelling, or purulent drainage. Skin approximated, healing noted.   Cardiovascular:      Rate and Rhythm: Normal rate.   Pulmonary:      Effort: Pulmonary effort is normal.   Skin:     General: Skin is warm and dry.   Neurological:      Mental Status: He is alert.   Psychiatric:         Mood and Affect: Mood normal.         Behavior: Behavior normal.         Thought Content: Thought content normal.         Judgment: Judgment normal.

## 2023-01-11 ENCOUNTER — OFFICE VISIT (OUTPATIENT)
Dept: UROLOGY | Facility: CLINIC | Age: 68
End: 2023-01-11
Payer: COMMERCIAL

## 2023-01-11 VITALS
DIASTOLIC BLOOD PRESSURE: 78 MMHG | WEIGHT: 240 LBS | BODY MASS INDEX: 29.84 KG/M2 | SYSTOLIC BLOOD PRESSURE: 138 MMHG | HEIGHT: 75 IN

## 2023-01-11 DIAGNOSIS — K59.01 SLOW TRANSIT CONSTIPATION: ICD-10-CM

## 2023-01-11 DIAGNOSIS — R31.29 MICROSCOPIC HEMATURIA: ICD-10-CM

## 2023-01-11 DIAGNOSIS — N39.41 URGE INCONTINENCE: ICD-10-CM

## 2023-01-11 DIAGNOSIS — R35.0 URINARY FREQUENCY: Primary | ICD-10-CM

## 2023-01-11 LAB
ALBUMIN UR-MCNC: NEGATIVE MG/DL
APPEARANCE UR: CLEAR
BILIRUB UR QL STRIP: NEGATIVE
COLOR UR AUTO: YELLOW
GLUCOSE UR STRIP-MCNC: NEGATIVE MG/DL
HGB UR QL STRIP: ABNORMAL
KETONES UR STRIP-MCNC: NEGATIVE MG/DL
LEUKOCYTE ESTERASE UR QL STRIP: NEGATIVE
NITRATE UR QL: NEGATIVE
PH UR STRIP: 6 [PH] (ref 5–7)
RESIDUAL VOLUME (RV) (EXTERNAL): 30
SP GR UR STRIP: 1.02 (ref 1–1.03)
UROBILINOGEN UR STRIP-ACNC: 0.2 E.U./DL

## 2023-01-11 PROCEDURE — 51798 US URINE CAPACITY MEASURE: CPT | Performed by: PHYSICIAN ASSISTANT

## 2023-01-11 PROCEDURE — 81003 URINALYSIS AUTO W/O SCOPE: CPT | Mod: QW | Performed by: PHYSICIAN ASSISTANT

## 2023-01-11 PROCEDURE — 99203 OFFICE O/P NEW LOW 30 MIN: CPT | Mod: 25 | Performed by: PHYSICIAN ASSISTANT

## 2023-01-11 RX ORDER — POLYETHYLENE GLYCOL 3350 17 G/17G
1 POWDER, FOR SOLUTION ORAL DAILY
COMMUNITY

## 2023-01-11 ASSESSMENT — ENCOUNTER SYMPTOMS
SHORTNESS OF BREATH: 0
VOMITING: 0
FEVER: 0
NAUSEA: 0
CHILLS: 0
CONSTIPATION: 1
DYSURIA: 0
HEMATURIA: 0
FREQUENCY: 1

## 2023-01-11 ASSESSMENT — PAIN SCALES - GENERAL: PAINLEVEL: MILD PAIN (3)

## 2023-01-11 NOTE — PROGRESS NOTES
Subjective      REQUESTING PROVIDER   Referred Self     REASON FOR CONSULT   Urinary urgency and frequency    HISTORY OF PRESENT ILLNESS   Mr. Padilla is a very pleasant 57-year-old gentleman, who presents today for further evaluation recommendations regarding urinary urgency.  He notes that he has difficulty with urgency, frequency, and occasional urge incontinence.  This started approximately 1 to 1.5 years ago.  He does feel like he empties his bladder completely.  Denies hematuria, dysuria,  or urinary tract infections.  He did note that he may have had a episode of nephrolithiasis 1 time.  He typically has nocturia x1, occasionally 2.  He would categorize his stream as adequate or strong.    Patient has issues with chronic constipation.  He saw [facility], and they recommend that he continue MiraLAX twice daily.    He is a former smoker.  He smokes 5 to 6 cigarettes/day for 15 to 20 years.  He quit approximately 3 to 4 years ago.  He denies taking blood thinning medications.  No family history of nephrolithiasis or  malignancy.    Fluid intake includes coffee and water.  Postvoid residual today is 30 mL.  Urinalysis had a trace amount of blood.    He does identify that also cold weather is a trigger for him having urinary urgency.  He does drive schoolbus sometimes.  This urination does make his quality of life decreased.    AUA symptom score 9-0-3-5-1-0-2 quality-of-life 3.5.    PSA is low at 0.23.    The following portions of the patient's history were reviewed and updated as appropriate: allergies, current medications, past family history, past medical history, past social history, past surgical history and problem list.     REVIEW OF SYSTEMS   Review of Systems   Constitutional: Negative for chills and fever.   Respiratory: Negative for shortness of breath.    Cardiovascular: Negative for chest pain.   Gastrointestinal: Positive for constipation. Negative for nausea and vomiting.   Genitourinary: Positive for  "frequency and urgency. Negative for dysuria and hematuria.      Per HPI.     Patient Active Problem List   Diagnosis     Disorder of bursae and tendons in shoulder region     Shoulder impingement syndrome     Internal derangement of shoulder     CARDIOVASCULAR SCREENING; LDL GOAL LESS THAN 160     Pseudogout of left knee     Peptic ulcer     Primary osteoarthritis of both knees     Iron deficiency anemia due to chronic blood loss     Arthritis of elbow     Incomplete tear of right rotator cuff     Slow transit constipation     Benign prostatic hyperplasia with urinary hesitancy     Smoking     Bilateral carpal tunnel syndrome     Chronic right shoulder pain     H/O shoulder surgery      Past Medical History:   Diagnosis Date     Arthritis of elbow 11/16/2018     Broken foot     fall     Gastric ulcer     2016, had endoscopy     Shingles       Past Surgical History:   Procedure Laterality Date     COLONOSCOPY  01/08/2016    Dr. Covarrubias [facility]     COLONOSCOPY  01/08/2016    Procedure: COLONOSCOPY;  Surgeon: Juan Covarrubias MD;  Location:  GI     ESOPHAGOSCOPY, GASTROSCOPY, DUODENOSCOPY (EGD), COMBINED  01/08/2016    Dr. Covarrubias [facility]     ESOPHAGOSCOPY, GASTROSCOPY, DUODENOSCOPY (EGD), COMBINED N/A 01/08/2016    Procedure: COMBINED ESOPHAGOSCOPY, GASTROSCOPY, DUODENOSCOPY (EGD), BIOPSY SINGLE OR MULTIPLE;  Surgeon: Juan Covarrubias MD;  Location:  GI     ORTHOPEDIC SURGERY Left 1976    left shoulder, rotator cuff repair     ORTHOPEDIC SURGERY Right 1980    wrist tendon repair     ORTHOPEDIC SURGERY Right 2011    right foot fx-metal in foot-ORIF     ORTHOPEDIC SURGERY Right 1981    Right elbow, dx \"little league elbow\", it was cleaned out     ORTHOPEDIC SURGERY Right 2008    Right shoulder, rotator cuff repair      Social History:   .  Occasionally drives school bus.  Former smoker.  Patient smokes 5 to 6 cigarettes/day.  He did this for approximately 15 to 20 years.  He quit approximately 3 to 4 years " "ago.    Family History:   No family history of nephrolithiasis or  malignancy.    Objective      PHYSICAL EXAM   /78   Ht 1.892 m (6' 2.5\")   Wt 108.9 kg (240 lb)   BMI 30.40 kg/m     Physical Exam  Constitutional:       Appearance: Normal appearance.   HENT:      Head: Normocephalic.      Nose: Nose normal.   Eyes:      General: No scleral icterus.  Pulmonary:      Effort: Pulmonary effort is normal.   Abdominal:      General: There is no distension.      Tenderness: There is no right CVA tenderness or left CVA tenderness.   Genitourinary:     Comments: Prostate: Approximately 15 to 20 g.  No palpable masses, nodules, or induration.  Nontender.  Rectum: Normal rectal tone.  Musculoskeletal:         General: Normal range of motion.      Cervical back: Normal range of motion.   Skin:     General: Skin is warm and dry.   Neurological:      General: No focal deficit present.      Mental Status: He is alert and oriented to person, place, and time.   Psychiatric:         Mood and Affect: Mood normal.         Behavior: Behavior normal.        LABORATORY   Recent Labs   Lab Test 01/11/23  1248   COLOR Yellow   APPEARANCE Clear   URINEGLC Negative   URINEBILI Negative   URINEKETONE Negative   SG 1.025   UBLD Trace*   URINEPH 6.0   PROTEIN Negative   UROBILINOGEN 0.2   NITRITE Negative   LEUKEST Negative     Lab Results   Component Value Date    PSA 0.23 12/16/2021    PSA 0.37 08/25/2020    PSA 0.26 11/16/2018    PSA 0.21 04/25/2016      TESTING    PVR: 30 mL    Assessment & Plan    1. Urinary frequency    2. Urge incontinence    3. Microscopic hematuria    4. Slow transit constipation        I had the pleasure today of meeting with Mr. Padilla to discuss his urinary symptomatology.  Patient is most bothered by urinary urgency and occasional urge incontinence.  We discussed that this can be due to to issues with the prostate, bladder, urethral stricture, or a combination of these items.  Based upon his digital rectal " examination and urinary symptoms, I think it is likely more a bladder issue as opposed to a prostate issue.    We discussed that we typically would treat urge incontinence first in a mixed incontinence picture of urge and stress incontinence. Typical treatments for urge incontinence include avoiding bladder irritants, biofeedback bladder retraining, overactive bladder medications such as oxybutynin, posterior tibial nerve stimulation (PTNS), Botox (which would require learning to perform clean intermittent catheterization and place it works too well), and InterStim. We typically go through this in a stepwise fashion.     Possible side effects with overactive bladder medications that are anticholinergic medication include dry eyes, dry mouth, constipation, difficulties with sweating, and possible mental fogginess. These medications typically take 3-4 weeks to work. Side effects will typically show up prior to this. Myrbetriq is not in anticholinergic medication, but it is rather extensive even after insurance. Most common side effect with this is hypertension.     We also discussed the relationship between bowel and bladder and how constipation can exacerbate urinary symptoms such as urgency, frequency, and urge incontinence.  He is emptying his bladder well.    Additionally, urinalysis today shows evidence of trace amount of blood.    We will plan on the following:    -Would recommend continuing to work on constipation.  This can worsen urinary symptoms.  Continue with 17 g of Miralax 1-2 times daily.    -Will send urine for microscopy.  If evidence of microscopic blood, would recommend cystoscopy and CT Urogram.    -Referral to pelvic PT for urgency, frequency, and constipation.    -List of bladder irritants was provided to use as a guideline.    -Follow up in 3-4 months to reassess symptoms.    If no improvement with PT, could consider cystoscopy, trial of an overactive bladder medication, or a trial of an  alpha-blocker in the future.    Signed by:     Hilda Callaway PA-C 1/11/2023 1:07 PM

## 2023-01-11 NOTE — PATIENT INSTRUCTIONS
Would recommend continuing to work on constipation.  This can worsen urinary symptoms.  Continue with 17 g of Miralax 1-2 times daily.    Will send urine for microscopy.  If evidence of microscopic blood, would recommend cystoscopy and CT Urogram.    Referral to pelvic PT for urgency, frequency, and constipation.    **This order will print in the [facility] Scheduling Office**    Physical Therapy available through:    *[facility]    Call one number to schedule at any of the above locations: [phone].  Your provider has referred you to: Physical Therapy at [facility] or [facility] [place]    Below is a list of things that can irritate the bladder and should try to remove to see if it improves your symptoms:     Caffeinated soft drinks.  Coffee.  Tea.  Chocolate.  Tomato-based foods.  Acidic juices and fruits. (includes cranberry juice)  Alcohol.  Carbonated drinks.  Aspartame/Nutrasweet (artificial sweeteners)  Vitamin C supplements and citrus fruit  Spicy food     Follow up in 3-4 months to reassess symptoms.    Contact us in the interim with questions, concerns, or changes in symptomatology.  [phone]

## 2023-01-11 NOTE — LETTER
1/11/2023       RE: Guanako Padilla  [address] [facility] [id number]     Dear Colleague,    Thank you for referring your patient, Guanako Padilla, to the [facility] [place] [facility]. Please see a copy of my visit note below.    Subjective       REQUESTING PROVIDER   Referred Self     REASON FOR CONSULT   Urinary urgency and frequency    HISTORY OF PRESENT ILLNESS   Mr. Padilla is a very pleasant 57-year-old gentleman, who presents today for further evaluation recommendations regarding urinary urgency.  He notes that he has difficulty with urgency, frequency, and occasional urge incontinence.  This started approximately 1 to 1.5 years ago.  He does feel like he empties his bladder completely.  Denies hematuria, dysuria,  or urinary tract infections.  He did note that he may have had a episode of nephrolithiasis 1 time.  He typically has nocturia x1, occasionally 2.  He would categorize his stream as adequate or strong.    Patient has issues with chronic constipation.  He saw [facility], and they recommend that he continue MiraLAX twice daily.    He is a former smoker.  He smokes 5 to 6 cigarettes/day for 15 to 20 years.  He quit approximately 3 to 4 years ago.  He denies taking blood thinning medications.  No family history of nephrolithiasis or  malignancy.    Fluid intake includes coffee and water.  Postvoid residual today is 30 mL.  Urinalysis had a trace amount of blood.    He does identify that also cold weather is a trigger for him having urinary urgency.  He does drive schoolbus sometimes.  This urination does make his quality of life decreased.    AUA symptom score 2-2-2-5-1-0-2 quality-of-life 3.5.    PSA is low at 0.23.    The following portions of the patient's history were reviewed and updated as appropriate: allergies, current medications, past family history, past medical history, past social history, past surgical history and  "problem list.     REVIEW OF SYSTEMS   Review of Systems   Constitutional: Negative for chills and fever.   Respiratory: Negative for shortness of breath.    Cardiovascular: Negative for chest pain.   Gastrointestinal: Positive for constipation. Negative for nausea and vomiting.   Genitourinary: Positive for frequency and urgency. Negative for dysuria and hematuria.      Per HPI.     Patient Active Problem List   Diagnosis     Disorder of bursae and tendons in shoulder region     Shoulder impingement syndrome     Internal derangement of shoulder     CARDIOVASCULAR SCREENING; LDL GOAL LESS THAN 160     Pseudogout of left knee     Peptic ulcer     Primary osteoarthritis of both knees     Iron deficiency anemia due to chronic blood loss     Arthritis of elbow     Incomplete tear of right rotator cuff     Slow transit constipation     Benign prostatic hyperplasia with urinary hesitancy     Smoking     Bilateral carpal tunnel syndrome     Chronic right shoulder pain     H/O shoulder surgery      Past Medical History:   Diagnosis Date     Arthritis of elbow 11/16/2018     Broken foot     fall     Gastric ulcer     2016, had endoscopy     Shingles       Past Surgical History:   Procedure Laterality Date     COLONOSCOPY  01/08/2016    Dr. Satish GARCIA     COLONOSCOPY  01/08/2016    Procedure: COLONOSCOPY;  Surgeon: Juan Covarrubias MD;  Location:  GI     ESOPHAGOSCOPY, GASTROSCOPY, DUODENOSCOPY (EGD), COMBINED  01/08/2016    Dr. Satish GOMEZ     ESOPHAGOSCOPY, GASTROSCOPY, DUODENOSCOPY (EGD), COMBINED N/A 01/08/2016    Procedure: COMBINED ESOPHAGOSCOPY, GASTROSCOPY, DUODENOSCOPY (EGD), BIOPSY SINGLE OR MULTIPLE;  Surgeon: Juan Covarrubias MD;  Location:  GI     ORTHOPEDIC SURGERY Left 1976    left shoulder, rotator cuff repair     ORTHOPEDIC SURGERY Right 1980    wrist tendon repair     ORTHOPEDIC SURGERY Right 2011    right foot fx-metal in foot-ORIF     ORTHOPEDIC SURGERY Right 1981    Right elbow, dx \"little " "league elbow\", it was cleaned out     ORTHOPEDIC SURGERY Right 2008    Right shoulder, rotator cuff repair      Social History:   .  Occasionally drives school bus.  Former smoker.  Patient smokes 5 to 6 cigarettes/day.  He did this for approximately 15 to 20 years.  He quit approximately 3 to 4 years ago.    Family History:   No family history of nephrolithiasis or  malignancy.    Objective       PHYSICAL EXAM   /78   Ht 1.892 m (6' 2.5\")   Wt 108.9 kg (240 lb)   BMI 30.40 kg/m     Physical Exam  Constitutional:       Appearance: Normal appearance.   HENT:      Head: Normocephalic.      Nose: Nose normal.   Eyes:      General: No scleral icterus.  Pulmonary:      Effort: Pulmonary effort is normal.   Abdominal:      General: There is no distension.      Tenderness: There is no right CVA tenderness or left CVA tenderness.   Genitourinary:     Comments: Prostate: Approximately 15 to 20 g.  No palpable masses, nodules, or induration.  Nontender.  Rectum: Normal rectal tone.  Musculoskeletal:         General: Normal range of motion.      Cervical back: Normal range of motion.   Skin:     General: Skin is warm and dry.   Neurological:      General: No focal deficit present.      Mental Status: He is alert and oriented to person, place, and time.   Psychiatric:         Mood and Affect: Mood normal.         Behavior: Behavior normal.        LABORATORY   Recent Labs   Lab Test 01/11/23  1248   COLOR Yellow   APPEARANCE Clear   URINEGLC Negative   URINEBILI Negative   URINEKETONE Negative   SG 1.025   UBLD Trace*   URINEPH 6.0   PROTEIN Negative   UROBILINOGEN 0.2   NITRITE Negative   LEUKEST Negative     Lab Results   Component Value Date    PSA 0.23 12/16/2021    PSA 0.37 08/25/2020    PSA 0.26 11/16/2018    PSA 0.21 04/25/2016      TESTING    PVR: 30 mL    Assessment & Plan    1. Urinary frequency    2. Urge incontinence    3. Microscopic hematuria    4. Slow transit constipation        I had the " pleasure today of meeting with Mr. Padilla to discuss his urinary symptomatology.  Patient is most bothered by urinary urgency and occasional urge incontinence.  We discussed that this can be due to to issues with the prostate, bladder, urethral stricture, or a combination of these items.  Based upon his digital rectal examination and urinary symptoms, I think it is likely more a bladder issue as opposed to a prostate issue.    We discussed that we typically would treat urge incontinence first in a mixed incontinence picture of urge and stress incontinence. Typical treatments for urge incontinence include avoiding bladder irritants, biofeedback bladder retraining, overactive bladder medications such as oxybutynin, posterior tibial nerve stimulation (PTNS), Botox (which would require learning to perform clean intermittent catheterization and place it works too well), and InterStim. We typically go through this in a stepwise fashion.     Possible side effects with overactive bladder medications that are anticholinergic medication include dry eyes, dry mouth, constipation, difficulties with sweating, and possible mental fogginess. These medications typically take 3-4 weeks to work. Side effects will typically show up prior to this. Myrbetriq is not in anticholinergic medication, but it is rather extensive even after insurance. Most common side effect with this is hypertension.     We also discussed the relationship between bowel and bladder and how constipation can exacerbate urinary symptoms such as urgency, frequency, and urge incontinence.  He is emptying his bladder well.    Additionally, urinalysis today shows evidence of trace amount of blood.    We will plan on the following:    -Would recommend continuing to work on constipation.  This can worsen urinary symptoms.  Continue with 17 g of Miralax 1-2 times daily.    -Will send urine for microscopy.  If evidence of microscopic blood, would recommend cystoscopy and CT  Urogram.    -Referral to pelvic PT for urgency, frequency, and constipation.    -List of bladder irritants was provided to use as a guideline.    -Follow up in 3-4 months to reassess symptoms.    If no improvement with PT, could consider cystoscopy, trial of an overactive bladder medication, or a trial of an alpha-blocker in the future.    Signed by:     Hilda Callaway PA-C 1/11/2023 1:07 PM

## 2023-01-11 NOTE — NURSING NOTE
Chief Complaint   Patient presents with     Urinary Frequency     Urinary Urgency     PVR: 30 mL by bladder scan    Raisa Hancock, EMT

## 2023-01-16 ENCOUNTER — THERAPY VISIT (OUTPATIENT)
Dept: PHYSICAL THERAPY | Facility: CLINIC | Age: 68
End: 2023-01-16
Attending: PHYSICIAN ASSISTANT
Payer: COMMERCIAL

## 2023-01-16 DIAGNOSIS — R35.0 URINARY FREQUENCY: ICD-10-CM

## 2023-01-16 DIAGNOSIS — K59.01 SLOW TRANSIT CONSTIPATION: ICD-10-CM

## 2023-01-16 DIAGNOSIS — N39.41 URGE INCONTINENCE: ICD-10-CM

## 2023-01-16 PROCEDURE — 90913 BFB TRAINING EA ADDL 15 MIN: CPT | Mod: GP | Performed by: PHYSICAL THERAPIST

## 2023-01-16 PROCEDURE — 90912 BFB TRAINING 1ST 15 MIN: CPT | Mod: GP | Performed by: PHYSICAL THERAPIST

## 2023-01-16 PROCEDURE — 97161 PT EVAL LOW COMPLEX 20 MIN: CPT | Mod: GP | Performed by: PHYSICAL THERAPIST

## 2023-01-16 NOTE — PROGRESS NOTES
[facility]    OUTPATIENT Physical Therapy ORTHOPEDIC EVALUATION  PLAN OF TREATMENT FOR OUTPATIENT REHABILITATION  (COMPLETE FOR INITIAL CLAIMS ONLY)  Patient's Last Name, First Name, M.I.  YOB: 1955  Guanako Padilla    Provider s Name:  [facility]   Medical Record No.  [id number]   Start of Care Date:  01/16/23   Onset Date:   01/11/23   Treatment Diagnosis:  urinary urgency, incontinence and constipation Medical Diagnosis:     Urinary frequency  Slow transit constipation  Urge incontinence       Goals:     01/16/23 0500   Body Part   Goals listed below are for pelvic   Other Goal   Activity urination   Previous Functional Level pt able to delay urination for 15 minutes   Current Functional Level pt unable to delay urination for 5 minutes   STG Target Performance pt able to delay urination for 5 minutes   Rationale good urinary habits   Due Date 02/16/23   LTG Target Performance pt able to delay urination for 15 minutes   Rationale good urinary habits   Due Date 03/23/23         Therapy Frequency:  1x/week  Predicted Duration of Therapy Intervention:  8 weeks    Jose Garcia, PT                 I CERTIFY THE NEED FOR THESE SERVICES FURNISHED UNDER        THIS PLAN OF TREATMENT AND WHILE UNDER MY CARE     (Physician attestation of this document indicates review and certification of the therapy plan).                     Certification Date From:  01/16/23   Certification Date To:  03/13/23    Referring Provider:  Hilda Callaway    Initial Assessment        See Epic Evaluation SOC Date: 01/16/23

## 2023-01-16 NOTE — PROGRESS NOTES
Physical Therapy Initial Evaluation  Subjective:  Pt reports onset of urinary urgency, incontinence and constipation over the past 6 months of unknown etiology. Pt referred by MD for physical therapy on 1-11-23      SUBJECTIVE:  Urination:  Do you feel the sensation of your bladder filling? Yes  Do you leak on the way to the bathroom or with a strong urge to void? Yes  Do you leak with cough,sneeze, jumping, running? No  Do you have triggers that make you feel you can't wait to go to the bathroom? Yes what are they cold and activity.  How long can you delay the need to urinate? 3 - 10 minutes.   How many times do you get up to urinate at night? 2  Can you stop the flow of urine when on the toilet? Not sure  Is the volume of urine passed usually: large. (8sec rule= 250ml with average bladder storing 400-600ml)  Do you strain to pass urine? No  Do you have a slow or hesitant urinary stream? No  Do you have difficulty initiating the urine stream? No  Fluid intake(one glass is 8oz or one cup)  2glasses/day, 3 caffinated glasses/day  2 alcohol glasses/day.  Bowel habits:  Frequency of bowel movements?4 times a week  Consistancy of stool? soft,   Do you ignore the urge to defecate? No  Do you strain to pass stool? No  Do you have any pain? No  Are you currently on any medication for pain or bladder control Miralax    The history is provided by the patient. No  was used.   Therapist Generated HPI Evaluation         Type of problem:  Incontinence (urinary urgency).    This is a chronic condition.  Condition occurred with:  Insidious onset.  Where condition occurred: for unknown reasons.  Site of Pain: abdominal discomfort before a bowel movement   Pain is described as aching and is intermittent.  Pain is worse during the day.  Since onset symptoms are unchanged.  Exacerbated by: activity, fluid intake.  Relieved by: urinating.      Restrictions due to condition include:  Working in normal job without  restrictions ().                          Objective:  System                                 Pelvic Dysfunction Evaluation:        Flexibility:    Tightness present at:Adductors; Iliopsoas; Hamstrings; Piriformis and Gluteals    Abdominal Wall:  normal                Internal Assessment:      Contraction/Grade:  Good squeeze, good hold with lift, repeatable (4)  Accessory Muscle use-Abdominals:  Yes   Accessory Muscle use-Gluteals:  Yes  Accessory Muscle use-Adductors:  Yes    SEMG Biofeedback:    Equipment:  Biofeedback    Suraface electrode placement--Perianal:  Bilateral  Baseline EMG PM:  7 mV    Peak pelvic muscle contraction:  12 mV  Sustained contraction:  4 seconds     Position:  SupineAdditional History:        Caffeine Consumption:  3 cups of coffee per day                     General     ROS    Assessment/Plan:    Patient is a 68 year old male with lumbar complaints.    Patient has the following significant findings with corresponding treatment plan.                Diagnosis 1:  Urinary urgency, incontinence and constipation  Decreased strength - therapeutic exercise, therapeutic activities and home program  Impaired muscle performance - biofeedback, neuro re-education and home program    Therapy Evaluation Codes:   1) History comprised of:   Personal factors that impact the plan of care:      None.    Comorbidity factors that impact the plan of care are:      Bowel/bladder changes and Osteoarthritis.     Medications impacting care: Anti-inflammatory and miralax.  2) Examination of Body Systems comprised of:   Body structures and functions that impact the plan of care:      Pelvis.   Activity limitations that impact the plan of care are:      Urgency, Urinary incontinence and constipation.  3) Clinical presentation characteristics are:   Stable/Uncomplicated.  4) Decision-Making    Low complexity using standardized patient assessment instrument and/or measureable assessment of functional  outcome.  Cumulative Therapy Evaluation is: Low complexity.    Previous and current functional limitations:  (See Goal Flow Sheet for this information)    Short term and Long term goals: (See Goal Flow Sheet for this information)     Communication ability:  Patient appears to be able to clearly communicate and understand verbal and written communication and follow directions correctly.  Treatment Explanation - The following has been discussed with the patient:   RX ordered/plan of care  Anticipated outcomes  Possible risks and side effects  This patient would benefit from PT intervention to resume normal activities.   Rehab potential is good.    Frequency:  1 X week, once daily  Duration:  for 6 weeks  Discharge Plan:  Achieve all LTG.  Independent in home treatment program.  Reach maximal therapeutic benefit.    Please refer to the daily flowsheet for treatment today, total treatment time and time spent performing 1:1 timed codes.

## 2023-01-26 ENCOUNTER — THERAPY VISIT (OUTPATIENT)
Dept: PHYSICAL THERAPY | Facility: CLINIC | Age: 68
End: 2023-01-26
Attending: PHYSICIAN ASSISTANT
Payer: COMMERCIAL

## 2023-01-26 DIAGNOSIS — N39.41 URGE INCONTINENCE: Primary | ICD-10-CM

## 2023-01-26 DIAGNOSIS — R35.0 URINARY FREQUENCY: ICD-10-CM

## 2023-01-26 DIAGNOSIS — K59.01 SLOW TRANSIT CONSTIPATION: ICD-10-CM

## 2023-01-26 PROCEDURE — 90912 BFB TRAINING 1ST 15 MIN: CPT | Mod: GP | Performed by: PHYSICAL THERAPIST

## 2023-01-26 PROCEDURE — 90913 BFB TRAINING EA ADDL 15 MIN: CPT | Mod: GP | Performed by: PHYSICAL THERAPIST

## 2023-02-02 ENCOUNTER — THERAPY VISIT (OUTPATIENT)
Dept: PHYSICAL THERAPY | Facility: CLINIC | Age: 68
End: 2023-02-02
Attending: PHYSICIAN ASSISTANT
Payer: COMMERCIAL

## 2023-02-02 DIAGNOSIS — R35.0 URINARY FREQUENCY: ICD-10-CM

## 2023-02-02 DIAGNOSIS — N39.41 URGE INCONTINENCE: Primary | ICD-10-CM

## 2023-02-02 DIAGNOSIS — K59.01 SLOW TRANSIT CONSTIPATION: ICD-10-CM

## 2023-02-02 PROCEDURE — 90912 BFB TRAINING 1ST 15 MIN: CPT | Mod: GP | Performed by: PHYSICAL THERAPIST

## 2023-02-02 PROCEDURE — 90913 BFB TRAINING EA ADDL 15 MIN: CPT | Mod: GP | Performed by: PHYSICAL THERAPIST

## 2023-02-02 NOTE — PROGRESS NOTES
Subjective:  HPI  Physical Exam                    Objective:  System    Physical Exam    General     ROS    Assessment/Plan:    SUBJECTIVE  Subjective changes as noted by pt:  Pt notes increased pelvic floor strength     Current pain level: 0/10     Changes in function:  Pt is able to delay urination by performing quick flicks     Adverse reaction to treatment or activity:  None    OBJECTIVE  Changes in objective findings:  Pt demonstrates improved pelvic floor contractions in supine, siting and standing.         ASSESSMENT  Guanako continues to require intervention to meet STG and LTG's: PT  Patient's symptoms are resolving.  Response to therapy has shown an improvement in  strength and muscle control  Progress made towards STG/LTG?  Yes,     PLAN  Current treatment program is being advanced to more complex exercises.    PTA/ATC plan:  N/A    Please refer to the daily flowsheet for treatment today, total treatment time and time spent performing 1:1 timed codes.

## 2023-02-16 ENCOUNTER — THERAPY VISIT (OUTPATIENT)
Dept: PHYSICAL THERAPY | Facility: CLINIC | Age: 68
End: 2023-02-16
Payer: COMMERCIAL

## 2023-02-16 DIAGNOSIS — R35.0 URINARY FREQUENCY: ICD-10-CM

## 2023-02-16 DIAGNOSIS — N39.41 URGE INCONTINENCE: Primary | ICD-10-CM

## 2023-02-16 DIAGNOSIS — K59.01 SLOW TRANSIT CONSTIPATION: ICD-10-CM

## 2023-02-16 PROCEDURE — 90913 BFB TRAINING EA ADDL 15 MIN: CPT | Mod: GP | Performed by: PHYSICAL THERAPIST

## 2023-02-16 PROCEDURE — 90912 BFB TRAINING 1ST 15 MIN: CPT | Mod: GP | Performed by: PHYSICAL THERAPIST

## 2023-02-16 NOTE — PROGRESS NOTES
Subjective:  HPI  Physical Exam                    Objective:  System    Physical Exam    General     ROS    Assessment/Plan:    SUBJECTIVE  Subjective changes as noted by pt:  Pt has not noticed any incontinence since the last visit but still notes urgency.      Current pain level: 0/10     Changes in function:  Pt able to rise from chair without incontinence     Adverse reaction to treatment or activity:  None    OBJECTIVE  Changes in objective findings:  Pt demonstrates strong Kegel with rising from a chair and with ambulation        ASSESSMENT  Guanako continues to require intervention to meet STG and LTG's: PT  Patient's symptoms are resolving.  Response to therapy has shown an improvement in  incontinence  Progress made towards STG/LTG?  Yes,     PLAN  Current treatment program is being advanced to more complex exercises.    PTA/ATC plan:  N/A    Please refer to the daily flowsheet for treatment today, total treatment time and time spent performing 1:1 timed codes.

## 2023-03-02 ENCOUNTER — OFFICE VISIT (OUTPATIENT)
Dept: INTERNAL MEDICINE | Facility: CLINIC | Age: 68
End: 2023-03-02
Payer: COMMERCIAL

## 2023-03-02 ENCOUNTER — THERAPY VISIT (OUTPATIENT)
Dept: PHYSICAL THERAPY | Facility: CLINIC | Age: 68
End: 2023-03-02
Payer: COMMERCIAL

## 2023-03-02 VITALS
OXYGEN SATURATION: 98 % | SYSTOLIC BLOOD PRESSURE: 127 MMHG | WEIGHT: 248 LBS | RESPIRATION RATE: 18 BRPM | HEIGHT: 74 IN | DIASTOLIC BLOOD PRESSURE: 84 MMHG | TEMPERATURE: 97.9 F | BODY MASS INDEX: 31.83 KG/M2 | HEART RATE: 83 BPM

## 2023-03-02 DIAGNOSIS — N39.41 URGE INCONTINENCE: Primary | ICD-10-CM

## 2023-03-02 DIAGNOSIS — Z13.220 SCREENING FOR HYPERLIPIDEMIA: ICD-10-CM

## 2023-03-02 DIAGNOSIS — R06.83 SNORING: ICD-10-CM

## 2023-03-02 DIAGNOSIS — K59.01 SLOW TRANSIT CONSTIPATION: ICD-10-CM

## 2023-03-02 DIAGNOSIS — Z00.00 ENCOUNTER FOR ANNUAL WELLNESS EXAM IN MEDICARE PATIENT: Primary | ICD-10-CM

## 2023-03-02 DIAGNOSIS — Z13.29 SCREENING FOR THYROID DISORDER: ICD-10-CM

## 2023-03-02 DIAGNOSIS — Z12.5 SCREENING FOR PROSTATE CANCER: ICD-10-CM

## 2023-03-02 PROBLEM — R35.0 URINARY FREQUENCY: Status: RESOLVED | Noted: 2023-01-16 | Resolved: 2023-03-02

## 2023-03-02 PROCEDURE — 99213 OFFICE O/P EST LOW 20 MIN: CPT | Mod: 25 | Performed by: INTERNAL MEDICINE

## 2023-03-02 PROCEDURE — 90912 BFB TRAINING 1ST 15 MIN: CPT | Mod: GP | Performed by: PHYSICAL THERAPIST

## 2023-03-02 PROCEDURE — 90913 BFB TRAINING EA ADDL 15 MIN: CPT | Mod: GP | Performed by: PHYSICAL THERAPIST

## 2023-03-02 PROCEDURE — G0439 PPPS, SUBSEQ VISIT: HCPCS | Performed by: INTERNAL MEDICINE

## 2023-03-02 ASSESSMENT — ENCOUNTER SYMPTOMS
PALPITATIONS: 0
HEMATURIA: 0
ABDOMINAL PAIN: 0
WEAKNESS: 0
COUGH: 0
HEADACHES: 0
SORE THROAT: 0
HEMATOCHEZIA: 0
DYSURIA: 0
NAUSEA: 0
FREQUENCY: 0
CHILLS: 0
MYALGIAS: 1
DIZZINESS: 0
CONSTIPATION: 1
SHORTNESS OF BREATH: 0
HEARTBURN: 0
EYE PAIN: 0
FEVER: 0
DIARRHEA: 0
PARESTHESIAS: 0
NERVOUS/ANXIOUS: 0
ARTHRALGIAS: 1
JOINT SWELLING: 0

## 2023-03-02 ASSESSMENT — ACTIVITIES OF DAILY LIVING (ADL): CURRENT_FUNCTION: NO ASSISTANCE NEEDED

## 2023-03-02 NOTE — PATIENT INSTRUCTIONS
Preventive Health Recommendations:     See your health care provider every year to  Review health changes.   Discuss preventive care.    Review your medicines if your doctor has prescribed any.    Talk with your health care provider about whether you should have a test to screen for prostate cancer (PSA).  Every 3 years, have a diabetes test (fasting glucose). If you are at risk for diabetes, you should have this test more often.  Every 5 years, have a cholesterol test. Have this test more often if you are at risk for high cholesterol or heart disease.   Every 10 years, have a colonoscopy. Or, have a yearly FIT test (stool test). These exams will check for colon cancer.  Talk to with your health care provider about screening for Abdominal Aortic Aneurysm if you have a family history of AAA or have a history of smoking.    Shots:   Get a flu shot each year.   Get a tetanus shot every 10 years.   Talk to your doctor about your pneumonia vaccines. There are now two you should receive - Pneumovax (PPSV 23) and Prevnar (PCV 13).   Talk to your pharmacist about a shingles vaccine.   Talk to your doctor about the hepatitis B vaccine.  Nutrition:   Eat at least 5 servings of fruits and vegetables each day.   Eat whole-grain bread, whole-wheat pasta and brown rice instead of white grains and rice.   Get adequate Calcium and Vitamin D.   Lifestyle  Exercise for at least 150 minutes a week (30 minutes a day, 5 days a week). This will help you control your weight and prevent disease.   Limit alcohol to one drink per day.   No smoking.   Wear sunscreen to prevent skin cancer.  See your dentist every six months for an exam and cleaning.  See your eye doctor every 1 to 2 years to screen for conditions such as glaucoma, macular degeneration, cataracts, etc.    Personalized Prevention Plan  You are due for the preventive services outlined below.  Your care team is available to assist you in scheduling these services.  If you have  already completed any of these items, please share that information with your care team to update in your medical record.  Health Maintenance Due   Topic Date Due    Zoster (Shingles) Vaccine (1 of 2) Never done    LUNG CANCER SCREENING  Never done    AORTIC ANEURYSM SCREENING (SYSTEM ASSIGNED)  Never done    ANNUAL REVIEW OF HM ORDERS  12/16/2022

## 2023-03-02 NOTE — PROGRESS NOTES
Subjective:  HPI  Physical Exam                    Objective:  System    Physical Exam    General     ROS    Assessment/Plan:    SUBJECTIVE  Subjective changes as noted by pt:  Pt notes increased strength and decreased incontinence but pt has to concentrate on keeping the pelvic muscles tight to prevent leaking     Current pain level: 0/10     Changes in function:  Pt notes decreased incontinence with transfers and ambulation     Adverse reaction to treatment or activity:  None    OBJECTIVE  Changes in objective findings:  Maximum Kegel contraction 49mV. Pt is able to hold Kegel while rising from the chair.         ASSESSMENT  Guanako continues to require intervention to meet STG and LTG's: PT  Patient's symptoms are resolving.  Response to therapy has shown an improvement in  incontinence  Progress made towards STG/LTG?  Yes,     PLAN  Current treatment program is being advanced to more complex exercises.    PTA/ATC plan:  N/A    Please refer to the daily flowsheet for treatment today, total treatment time and time spent performing 1:1 timed codes.

## 2023-03-02 NOTE — PROGRESS NOTES
"SUBJECTIVE:   Galen is a 68 year old who presents for Preventive Visit.    Patient has been advised of split billing requirements and indicates understanding: Yes  Are you in the first 12 months of your Medicare coverage?  No    Patient is a 68-year-old  male who presents to the clinic for his annual physical.  Patient has had some chronic issues with constipation, and he has previously been seen by [facility] in May 2020.  At that time, they did recommend that he continue with the use of MiraLAX as he did seem to be getting some improvement with the use of this medication.  They did encourage him to return should his symptoms persist.  There was some discussion of a possible upper endoscopy and further testing to evaluate for other etiologies of his constipation.  Due to the pandemic, patient has not had any follow-up.  He continues to report issues with persistent constipation.  He does get mixed results with his use of MiraLAX.  Patient is wondering if he could receive a second opinion from another gastroenterologist.  He also reports some concerns about snoring.  Patient reports that his wife has noticed over the past 1 year that he tends to \"make a lot of noise while sleeping.\"  Patient states that he will awaken at night frequently as well.  He does not feel that he snores or has any issues with breathing at night.  Patient does report that he has had some issues with fatigue and daytime somnolence over the past 1 year as well.  Patient does drive a schoolbus, and he would like to have this issue further evaluated.  Otherwise, patient not reporting any other concerns.  He reports a stable appetite.  He he is not fasting for lab work at this time.    Healthy Habits:     In general, how would you rate your overall health?  Good    Frequency of exercise:  None    Do you usually eat at least 4 servings of fruit and vegetables a day, include whole grains    & fiber and avoid regularly eating " "high fat or \"junk\" foods?  Yes    Taking medications regularly:  Yes    Medication side effects:  None    Ability to successfully perform activities of daily living:  No assistance needed    Home Safety:  Lack of grab bars in the bathroom    Hearing Impairment:  Difficulty following a conversation in a noisy restaurant or crowded room and need to ask people to speak up or repeat themselves    In the past 6 months, have you been bothered by leaking of urine? Yes    In general, how would you rate your overall mental or emotional health?  Good      PHQ-2 Total Score: 0    Additional concerns today:  Yes      Have you ever done Advance Care Planning? (For example, a Health Directive, POLST, or a discussion with a medical provider or your loved ones about your wishes): No, advance care planning information given to patient to review.  Advanced care planning was discussed at today's visit.       Fall risk  Fallen 2 or more times in the past year?: Yes  Any fall with injury in the past year?: Yes    Cognitive Screening   1) Repeat 3 items (Leader, Season, Table)      2) Clock draw:   NORMAL  3) 3 item recall:   Recalls 1 object   Results: NORMAL clock, 1-2 items recalled: COGNITIVE IMPAIRMENT LESS LIKELY    Mini-CogTM Copyright JANETH Das. Licensed by the author for use in [facility]; reprinted with permission (kerri@[facility]). All rights reserved.      Do you have sleep apnea, excessive snoring or daytime drowsiness?: yes    Reviewed and updated as needed this visit by clinical staff                  Reviewed and updated as needed this visit by Provider                 Social History     Tobacco Use     Smoking status: Former     Packs/day: 0.50     Types: Cigarettes     Smokeless tobacco: Never   Substance Use Topics     Alcohol use: Yes     Alcohol/week: 0.0 standard drinks     Comment: 1 1/2 beers per day         Alcohol Use 3/2/2023   Prescreen: >3 drinks/day or >7 drinks/week? No   AUDIT SCORE  - "               Current providers sharing in care for this patient include:   Patient Care Team:  Gricel Woo MD as PCP - General (Family Practice)  Susan Raymond as Personal Advocate & Liaison (PAL) (Family Medicine)  Roberto Vega MD as Assigned PCP  Hilda Callaway PA-C as Physician Assistant (Urology)  Hilda Callaway PA-C as Assigned Surgical Provider    The following health maintenance items are reviewed in Epic and correct as of today:  Health Maintenance   Topic Date Due     ZOSTER IMMUNIZATION (1 of 2) Never done     LUNG CANCER SCREENING  Never done     AORTIC ANEURYSM SCREENING (SYSTEM ASSIGNED)  Never done     MEDICARE ANNUAL WELLNESS VISIT  12/16/2022     ANNUAL REVIEW OF HM ORDERS  12/16/2022     FALL RISK ASSESSMENT  03/02/2024     LIPID  08/25/2025     COLORECTAL CANCER SCREENING  01/08/2026     ADVANCE CARE PLANNING  12/16/2026     DTAP/TDAP/TD IMMUNIZATION (4 - Td or Tdap) 07/24/2027     HEPATITIS C SCREENING  Completed     PHQ-2 (once per calendar year)  Completed     INFLUENZA VACCINE  Completed     Pneumococcal Vaccine: 65+ Years  Completed     COVID-19 Vaccine  Completed     IPV IMMUNIZATION  Aged Out     MENINGITIS IMMUNIZATION  Aged Out     Lab work is in process          Review of Systems   Constitutional: Negative for chills and fever.   HENT: Positive for hearing loss. Negative for congestion, ear pain and sore throat.    Eyes: Negative for pain and visual disturbance.   Respiratory: Negative for cough and shortness of breath.    Cardiovascular: Negative for chest pain, palpitations and peripheral edema.   Gastrointestinal: Positive for constipation. Negative for abdominal pain, diarrhea, heartburn, hematochezia and nausea.   Genitourinary: Positive for impotence and urgency. Negative for dysuria, frequency, genital sores, hematuria and penile discharge.   Musculoskeletal: Positive for arthralgias and myalgias. Negative for joint swelling.   Skin: Negative for  "rash.   Neurological: Negative for dizziness, weakness, headaches and paresthesias.   Psychiatric/Behavioral: Negative for mood changes. The patient is not nervous/anxious.          OBJECTIVE:   Blood pressure 127/84, pulse 83, temperature 97.9  F (36.6  C), resp. rate 18, height 1.88 m (6' 2\"), weight 112.5 kg (248 lb), SpO2 98 %.     Estimated body mass index is 30.4 kg/m  as calculated from the following:    Height as of 1/11/23: 1.892 m (6' 2.5\").    Weight as of 1/11/23: 108.9 kg (240 lb).  Physical Exam  Vitals reviewed.   Constitutional:       Appearance: Normal appearance.   HENT:      Head: Normocephalic and atraumatic.      Right Ear: Tympanic membrane, ear canal and external ear normal.      Left Ear: Tympanic membrane, ear canal and external ear normal.      Mouth/Throat:      Mouth: Mucous membranes are moist.      Pharynx: Oropharynx is clear.   Eyes:      Extraocular Movements: Extraocular movements intact.      Conjunctiva/sclera: Conjunctivae normal.      Pupils: Pupils are equal, round, and reactive to light.   Cardiovascular:      Rate and Rhythm: Normal rate and regular rhythm.      Pulses: Normal pulses.      Heart sounds: Normal heart sounds.   Pulmonary:      Effort: Pulmonary effort is normal.      Breath sounds: Normal breath sounds.   Abdominal:      General: Bowel sounds are normal.      Palpations: Abdomen is soft.   Musculoskeletal:         General: Normal range of motion.      Cervical back: Normal range of motion and neck supple.   Skin:     General: Skin is warm.      Capillary Refill: Capillary refill takes less than 2 seconds.   Neurological:      General: No focal deficit present.      Mental Status: He is alert and oriented to person, place, and time.       Diagnostic Test Results: Future lab orders for CMP, CBC, FLP, TSH, and PSA have been placed.    ASSESSMENT / PLAN:   (Z00.00) Encounter for annual wellness exam in Medicare patient  (primary encounter diagnosis)  Comment: Adult " "wellness plan reviewed    (K59.01) Slow transit constipation  Comment: Given that he has had ongoing issues with persistent constipation despite his use of MiraLAX, I do feel that it would be worthwhile to consider repeat evaluation by gastroenterologist.  Patient did prefer to be seen by a different gastroenterologist group for a second opinion.  A GI referral was placed for him today.  We will follow-up on the recommendations once the evaluation has been completed.    (Z13.220) Screening for hyperlipidemia  Comment: Lipid panel reflex to direct LDL Fasting is pending.    (Z12.5) Screening for prostate cancer  Comment: PSA is pending.    (Z13.29) Screening for thyroid disorder  Comment: TSH with free T4 reflex is pending.    (R06.83) Snoring  Comment: Given reports of snoring and daytime somnolence, I do feel that it would be worthwhile for him to have a sleep study for further evaluation.  Patient was in agreement.  A referral for sleep study was placed today.  Patient will be contacted for scheduling of this study.      Patient has been advised of split billing requirements and indicates understanding: Yes      COUNSELING:  Reviewed preventive health counseling, as reflected in patient instructions      BMI:   Estimated body mass index is 30.4 kg/m  as calculated from the following:    Height as of 1/11/23: 1.892 m (6' 2.5\").    Weight as of 1/11/23: 108.9 kg (240 lb).   Weight management plan: Discussed healthy diet and exercise guidelines      He reports that he has quit smoking. His smoking use included cigarettes. He smoked an average of .5 packs per day. He has never used smokeless tobacco.      Appropriate preventive services were discussed with this patient, including applicable screening as appropriate for cardiovascular disease, diabetes, osteopenia/osteoporosis, and glaucoma.  As appropriate for age/gender, discussed screening for colorectal cancer, prostate cancer, breast cancer, and cervical cancer. " Checklist reviewing preventive services available has been given to the patient.    Reviewed patients plan of care and provided an AVS. The Basic Care Plan (routine screening as documented in Health Maintenance) for Guan[place] meets the Care Plan requirement. This Care Plan has been established and reviewed with the Patient.      Roberto Vega MD  [facility]    Identified Health Risks:

## 2023-03-06 ENCOUNTER — MYC MEDICAL ADVICE (OUTPATIENT)
Dept: INTERNAL MEDICINE | Facility: CLINIC | Age: 68
End: 2023-03-06
Payer: COMMERCIAL

## 2023-03-06 DIAGNOSIS — Z13.6 ENCOUNTER FOR ABDOMINAL AORTIC ANEURYSM SCREENING: Primary | ICD-10-CM

## 2023-03-06 NOTE — TELEPHONE ENCOUNTER
Please review MyChart message and advise.   Aortic aneurysm screening is listed as overdue in Health Maintenance.     Janie Diaz RN  [facility]

## 2023-03-08 ENCOUNTER — LAB (OUTPATIENT)
Dept: LAB | Facility: CLINIC | Age: 68
End: 2023-03-08
Payer: COMMERCIAL

## 2023-03-08 DIAGNOSIS — Z13.220 SCREENING FOR HYPERLIPIDEMIA: ICD-10-CM

## 2023-03-08 DIAGNOSIS — Z12.5 SCREENING FOR PROSTATE CANCER: ICD-10-CM

## 2023-03-08 DIAGNOSIS — Z00.00 ENCOUNTER FOR ANNUAL WELLNESS EXAM IN MEDICARE PATIENT: ICD-10-CM

## 2023-03-08 DIAGNOSIS — R31.29 MICROSCOPIC HEMATURIA: ICD-10-CM

## 2023-03-08 DIAGNOSIS — Z13.29 SCREENING FOR THYROID DISORDER: ICD-10-CM

## 2023-03-08 LAB
ALBUMIN SERPL BCG-MCNC: 4.1 G/DL (ref 3.5–5.2)
ALP SERPL-CCNC: 75 U/L (ref 40–129)
ALT SERPL W P-5'-P-CCNC: 17 U/L (ref 10–50)
ANION GAP SERPL CALCULATED.3IONS-SCNC: 10 MMOL/L (ref 7–15)
AST SERPL W P-5'-P-CCNC: 26 U/L (ref 10–50)
BASOPHILS # BLD AUTO: 0.1 10E3/UL (ref 0–0.2)
BASOPHILS NFR BLD AUTO: 1 %
BILIRUB SERPL-MCNC: 0.9 MG/DL
BUN SERPL-MCNC: 18.2 MG/DL (ref 8–23)
CALCIUM SERPL-MCNC: 9.3 MG/DL (ref 8.8–10.2)
CHLORIDE SERPL-SCNC: 105 MMOL/L (ref 98–107)
CHOLEST SERPL-MCNC: 228 MG/DL
CREAT SERPL-MCNC: 0.97 MG/DL (ref 0.67–1.17)
DEPRECATED HCO3 PLAS-SCNC: 26 MMOL/L (ref 22–29)
EOSINOPHIL # BLD AUTO: 0.1 10E3/UL (ref 0–0.7)
EOSINOPHIL NFR BLD AUTO: 2 %
ERYTHROCYTE [DISTWIDTH] IN BLOOD BY AUTOMATED COUNT: 14.2 % (ref 10–15)
GFR SERPL CREATININE-BSD FRML MDRD: 85 ML/MIN/1.73M2
GLUCOSE SERPL-MCNC: 87 MG/DL (ref 70–99)
HCT VFR BLD AUTO: 42.1 % (ref 40–53)
HDLC SERPL-MCNC: 74 MG/DL
HGB BLD-MCNC: 13.5 G/DL (ref 13.3–17.7)
HYALINE CASTS #/AREA URNS LPF: ABNORMAL /LPF
LDLC SERPL CALC-MCNC: 138 MG/DL
LYMPHOCYTES # BLD AUTO: 1.5 10E3/UL (ref 0.8–5.3)
LYMPHOCYTES NFR BLD AUTO: 27 %
MCH RBC QN AUTO: 28.8 PG (ref 26.5–33)
MCHC RBC AUTO-ENTMCNC: 32.1 G/DL (ref 31.5–36.5)
MCV RBC AUTO: 90 FL (ref 78–100)
MONOCYTES # BLD AUTO: 0.6 10E3/UL (ref 0–1.3)
MONOCYTES NFR BLD AUTO: 10 %
NEUTROPHILS # BLD AUTO: 3.4 10E3/UL (ref 1.6–8.3)
NEUTROPHILS NFR BLD AUTO: 60 %
NONHDLC SERPL-MCNC: 154 MG/DL
PLATELET # BLD AUTO: 228 10E3/UL (ref 150–450)
POTASSIUM SERPL-SCNC: 4.5 MMOL/L (ref 3.4–5.3)
PROT SERPL-MCNC: 7.2 G/DL (ref 6.4–8.3)
PSA SERPL-MCNC: 0.2 NG/ML (ref 0–4.5)
RBC # BLD AUTO: 4.68 10E6/UL (ref 4.4–5.9)
RBC #/AREA URNS AUTO: ABNORMAL /HPF
SODIUM SERPL-SCNC: 141 MMOL/L (ref 136–145)
TRIGL SERPL-MCNC: 82 MG/DL
TSH SERPL DL<=0.005 MIU/L-ACNC: 1.31 UIU/ML (ref 0.3–4.2)
WBC # BLD AUTO: 5.7 10E3/UL (ref 4–11)
WBC #/AREA URNS AUTO: ABNORMAL /HPF

## 2023-03-08 PROCEDURE — 80061 LIPID PANEL: CPT

## 2023-03-08 PROCEDURE — 84443 ASSAY THYROID STIM HORMONE: CPT

## 2023-03-08 PROCEDURE — 80053 COMPREHEN METABOLIC PANEL: CPT

## 2023-03-08 PROCEDURE — G0103 PSA SCREENING: HCPCS

## 2023-03-08 PROCEDURE — 81015 MICROSCOPIC EXAM OF URINE: CPT

## 2023-03-08 PROCEDURE — 85025 COMPLETE CBC W/AUTO DIFF WBC: CPT

## 2023-03-08 PROCEDURE — 36415 COLL VENOUS BLD VENIPUNCTURE: CPT

## 2023-03-08 NOTE — TELEPHONE ENCOUNTER
Family-Mingle message sent to patient/caregiver with provider recommendations.     Janie Diaz RN  [facility]

## 2023-03-16 ENCOUNTER — THERAPY VISIT (OUTPATIENT)
Dept: PHYSICAL THERAPY | Facility: CLINIC | Age: 68
End: 2023-03-16
Payer: COMMERCIAL

## 2023-03-16 DIAGNOSIS — K59.01 SLOW TRANSIT CONSTIPATION: ICD-10-CM

## 2023-03-16 DIAGNOSIS — N39.41 URGE INCONTINENCE: Primary | ICD-10-CM

## 2023-03-16 PROCEDURE — 90913 BFB TRAINING EA ADDL 15 MIN: CPT | Mod: GP | Performed by: PHYSICAL THERAPIST

## 2023-03-16 PROCEDURE — 90912 BFB TRAINING 1ST 15 MIN: CPT | Mod: GP | Performed by: PHYSICAL THERAPIST

## 2023-03-16 NOTE — PROGRESS NOTES
Subjective:  HPI  Physical Exam                    Objective:  System    Physical Exam    General     ROS    Assessment/Plan:    DISCHARGE REPORT    Progress reporting period is from 1/16/23 to 3/16/23.       SUBJECTIVE  Subjective changes noted by patient:  Pt notes increased strength and decreased incontinence.       Current pain level is 0/10  .     Previous pain level was  0/10  .   Changes in function:  Pt reports only one episode of slight urinary leakage in the past 2 weeks. Pt is no longer using any pads or diapers. Pt is waking 2 times per night to urinate.  Pt able to delay urination for 10 to 15 minutes . Adverse reaction to treatment or activity: None    OBJECTIVE  Changes noted in objective findings:  maximum pelvic floor contraction initial 7 mV final 33 mV. Pt is able to perform strong pelvic floor contractions in supine/sitting/standing positions, rising from a chair and with ambulation.         ASSESSMENT/PLAN  Updated problem list and treatment plan: Diagnosis 1:  Post op incontinence   Decreased strength - therapeutic exercise, therapeutic activities and home program  Impaired muscle performance - biofeedback, neuro re-education and home program  STG/LTGs have been met or progress has been made towards goals:  Yes  Assessment of Progress: The patient's condition is improving.  Self Management Plans:  Patient is independent in a home treatment program.  I have re-evaluated this patient and find that the nature, scope, duration and intensity of the therapy is appropriate for the medical condition of the patient.  [place] continues to require the following intervention to meet STG and LTG's:  PT intervention is no longer required to meet STG/LTG.    Recommendations:  This patient is ready to be discharged from therapy and continue their home treatment program.    Please refer to the daily flowsheet for treatment today, total treatment time and time spent performing 1:1 timed codes.

## 2023-03-27 ENCOUNTER — TRANSFERRED RECORDS (OUTPATIENT)
Dept: HEALTH INFORMATION MANAGEMENT | Facility: CLINIC | Age: 68
End: 2023-03-27

## 2023-04-14 ENCOUNTER — OFFICE VISIT (OUTPATIENT)
Dept: UROLOGY | Facility: CLINIC | Age: 68
End: 2023-04-14
Payer: COMMERCIAL

## 2023-04-14 VITALS
BODY MASS INDEX: 30.46 KG/M2 | SYSTOLIC BLOOD PRESSURE: 114 MMHG | DIASTOLIC BLOOD PRESSURE: 70 MMHG | HEIGHT: 75 IN | WEIGHT: 245 LBS

## 2023-04-14 DIAGNOSIS — R39.15 URINARY URGENCY: Primary | ICD-10-CM

## 2023-04-14 DIAGNOSIS — K59.01 SLOW TRANSIT CONSTIPATION: ICD-10-CM

## 2023-04-14 PROCEDURE — 99213 OFFICE O/P EST LOW 20 MIN: CPT | Performed by: PHYSICIAN ASSISTANT

## 2023-04-14 ASSESSMENT — PAIN SCALES - GENERAL: PAINLEVEL: MILD PAIN (3)

## 2023-04-14 NOTE — PROGRESS NOTES
Subjective      CHIEF COMPLAINT/REASON FOR VISIT   Follow up on urinary urgency and frequency    HISTORY OF PRESENT ILLNESS   Mr. Padilla is very pleasant 68 year old year old male, who presents today for follow-up on urinary urgency and frequency.  I initially saw him in January 2023 for urgency, frequency, and occasional urge incontinence but have been going on for a few years.  His PSA was very low.  Patient empties his bladder adequately with a postvoid residual of 30 mL.    He also had issues with chronic constipation.    Recommendation was for a trial of pelvic floor physical therapy, as his digital rectal examination did not show a particularly enlarged prostate and PSA was very low.  Urinalysis was also checked for microscopic hematuria and was negative.    Patient does feel like his urgency, frequency, and nocturia have improved since doing pelvic floor physical therapy.  Patient started his first session on 01/16/2023 and had his most recent session on 063/16/23.  He has been utilizing quick flex.  He is not had any urge incontinence since starting PT.  He does feel like his urgency and frequency are improved.  His bowel movements are    Approximately 1 time per day.  He did he undergo a colonoscopy.  Patient lost 18 pounds with his colonoscopy prep.  He is still continuing to have difficulties with constipation.    The following portions of the patient's history were reviewed and updated as appropriate: allergies, current medications, past family history, past medical history, past social history, past surgical history, and problem list.     REVIEW OF SYSTEMS   Review of Systems   Constitutional: Negative.    Gastrointestinal: Positive for constipation (Improving).   Genitourinary: Positive for frequency (Better) and urgency (Better).      Per HPI.     Patient Active Problem List   Diagnosis     Disorder of bursae and tendons in shoulder region     Shoulder impingement syndrome     Internal derangement of  "shoulder     CARDIOVASCULAR SCREENING; LDL GOAL LESS THAN 160     Pseudogout of left knee     Peptic ulcer     Primary osteoarthritis of both knees     Iron deficiency anemia due to chronic blood loss     Arthritis of elbow     Incomplete tear of right rotator cuff     Slow transit constipation     Benign prostatic hyperplasia with urinary hesitancy     Smoking     Bilateral carpal tunnel syndrome     Chronic right shoulder pain     H/O shoulder surgery     Urge incontinence      Past Medical History:   Diagnosis Date     Arthritis of elbow 11/16/2018     Broken foot     fall     Gastric ulcer     2016, had endoscopy     Shingles         Objective      PHYSICAL EXAM   /70   Ht 1.892 m (6' 2.5\")   Wt 111.1 kg (245 lb)   BMI 31.04 kg/m     Physical Exam  Constitutional:       Appearance: Normal appearance.   HENT:      Head: Normocephalic.      Nose: Nose normal.   Eyes:      General: No scleral icterus.  Pulmonary:      Effort: Pulmonary effort is normal.   Neurological:      General: No focal deficit present.      Mental Status: He is alert and oriented to person, place, and time.   Psychiatric:         Mood and Affect: Mood normal.         Behavior: Behavior normal.         LABORATORY     Lab Results   Component Value Date    PSA 0.20 03/08/2023    PSA 0.23 12/16/2021    PSA 0.37 08/25/2020    PSA 0.26 11/16/2018      Recent Labs   Lab Test 03/08/23  1255 01/11/23  1248   COLOR  --  Yellow   APPEARANCE  --  Clear   URINEGLC  --  Negative   URINEBILI  --  Negative   URINEKETONE  --  Negative   SG  --  1.025   UBLD  --  Trace*   URINEPH  --  6.0   PROTEIN  --  Negative   UROBILINOGEN  --  0.2   NITRITE  --  Negative   LEUKEST  --  Negative   RBCU 0-2  --    WBCU 0-5  --      Assessment & Plan    1. Urinary urgency    2. Slow transit constipation      I had the pleasure today of meeting with Mr. Padilla and his wife to discuss his urinary symptomatology.  He has been doing pelvic floor physical therapy and has " noted quite a bit of improvement.    We had also discussed other treatments for overactivity of the bladder such as overactive bladder medications, posterior tibial nerve stimulation, or Botox.  We were trying to avoid medication due to the risk of worsening patient's constipation.    He is continuing to work on his constipation.    Urinalysis was negative for microscopic hematuria.    We will plan on the following:  -Would recommend continuing to work on constipation.  This can worsen urinary symptoms.  Continue with 17 g of Miralax 1-2 times daily.     -Hydrate.    -Continue to do your pelvic floor PT exercises, since that has helped.  Would like to avoid medication due to the risk of increasing your constipation.    -Urine microscopy was negative for microscopic blood.    -Follow up as needed.    Contact us in the interim with questions, concerns, or changes in symptomatology.    Signed by:       Hilda Callaway PA-C 4/14/2023 1:11 PM

## 2023-04-14 NOTE — LETTER
4/14/2023       RE: Guanako Padilla  [address] [address] [facility] [address]     Dear Colleague,    Thank you for referring your patient, Guanako Padilla, to the [facility] [place] at [facility]. Please see a copy of my visit note below.    Subjective      CHIEF COMPLAINT/REASON FOR VISIT   Follow up on urinary urgency and frequency    HISTORY OF PRESENT ILLNESS   Mr. Padilla is very pleasant 68 year old year old male, who presents today for follow-up on urinary urgency and frequency.  I initially saw him in January 2023 for urgency, frequency, and occasional urge incontinence but have been going on for a few years.  His PSA was very low.  Patient empties his bladder adequately with a postvoid residual of 30 mL.    He also had issues with chronic constipation.    Recommendation was for a trial of pelvic floor physical therapy, as his digital rectal examination did not show a particularly enlarged prostate and PSA was very low.  Urinalysis was also checked for microscopic hematuria and was negative.    Patient does feel like his urgency, frequency, and nocturia have improved since doing pelvic floor physical therapy.  Patient started his first session on 01/16/2023 and had his most recent session on 063/16/23.  He has been utilizing quick flex.  He is not had any urge incontinence since starting PT.  He does feel like his urgency and frequency are improved.  His bowel movements are    Approximately 1 time per day.  He did he undergo a colonoscopy.  Patient lost 18 pounds with his colonoscopy prep.  He is still continuing to have difficulties with constipation.    The following portions of the patient's history were reviewed and updated as appropriate: allergies, current medications, past family history, past medical history, past social history, past surgical history, and problem list.     REVIEW OF SYSTEMS   Review of Systems   Constitutional:  "Negative.    Gastrointestinal: Positive for constipation (Improving).   Genitourinary: Positive for frequency (Better) and urgency (Better).      Per HPI.     Patient Active Problem List   Diagnosis    Disorder of bursae and tendons in shoulder region    Shoulder impingement syndrome    Internal derangement of shoulder    CARDIOVASCULAR SCREENING; LDL GOAL LESS THAN 160    Pseudogout of left knee    Peptic ulcer    Primary osteoarthritis of both knees    Iron deficiency anemia due to chronic blood loss    Arthritis of elbow    Incomplete tear of right rotator cuff    Slow transit constipation    Benign prostatic hyperplasia with urinary hesitancy    Smoking    Bilateral carpal tunnel syndrome    Chronic right shoulder pain    H/O shoulder surgery    Urge incontinence      Past Medical History:   Diagnosis Date    Arthritis of elbow 11/16/2018    Broken foot     fall    Gastric ulcer     2016, had endoscopy    Shingles         Objective      PHYSICAL EXAM   /70   Ht 1.892 m (6' 2.5\")   Wt 111.1 kg (245 lb)   BMI 31.04 kg/m     Physical Exam  Constitutional:       Appearance: Normal appearance.   HENT:      Head: Normocephalic.      Nose: Nose normal.   Eyes:      General: No scleral icterus.  Pulmonary:      Effort: Pulmonary effort is normal.   Neurological:      General: No focal deficit present.      Mental Status: He is alert and oriented to person, place, and time.   Psychiatric:         Mood and Affect: Mood normal.         Behavior: Behavior normal.         LABORATORY     Lab Results   Component Value Date    PSA 0.20 03/08/2023    PSA 0.23 12/16/2021    PSA 0.37 08/25/2020    PSA 0.26 11/16/2018      Recent Labs   Lab Test 03/08/23  1255 01/11/23  1248   COLOR  --  Yellow   APPEARANCE  --  Clear   URINEGLC  --  Negative   URINEBILI  --  Negative   URINEKETONE  --  Negative   SG  --  1.025   UBLD  --  Trace*   URINEPH  --  6.0   PROTEIN  --  Negative   UROBILINOGEN  --  0.2   NITRITE  --  Negative "   LEUKEST  --  Negative   RBCU 0-2  --    WBCU 0-5  --      Assessment & Plan    1. Urinary urgency    2. Slow transit constipation      I had the pleasure today of meeting with Mr. Padilla and his wife to discuss his urinary symptomatology.  He has been doing pelvic floor physical therapy and has noted quite a bit of improvement.    We had also discussed other treatments for overactivity of the bladder such as overactive bladder medications, posterior tibial nerve stimulation, or Botox.  We were trying to avoid medication due to the risk of worsening patient's constipation.    He is continuing to work on his constipation.    Urinalysis was negative for microscopic hematuria.    We will plan on the following:  -Would recommend continuing to work on constipation.  This can worsen urinary symptoms.  Continue with 17 g of Miralax 1-2 times daily.     -Hydrate.    -Continue to do your pelvic floor PT exercises, since that has helped.  Would like to avoid medication due to the risk of increasing your constipation.    -Urine microscopy was negative for microscopic blood.    -Follow up as needed.    Contact us in the interim with questions, concerns, or changes in symptomatology.    Signed by:       Hilda Callaway PA-C 4/14/2023 1:11 PM

## 2023-04-14 NOTE — PATIENT INSTRUCTIONS
-Would recommend continuing to work on constipation.  This can worsen urinary symptoms.  Continue with 17 g of Miralax 1-2 times daily.     -Hydrate.    -Continue to do your pelvic floor PT exercises, since that has helped.  Would like to avoid medication due to the risk of increasing your constipation.    -Urine microscopy was negative for microscopic blood.    -Follow up as needed.    Contact us in the interim with questions, concerns, or changes in symptomatology.  [phone]

## 2023-04-14 NOTE — NURSING NOTE
Chief Complaint   Patient presents with     Follow Up     Urinary frequency/urgency     Pt has completed physical therapy and feels as though symptoms have improved.  Still getting up 1-2 times per night.  Urinary urgency is much improved.    Raisa Hancock, EMT

## 2023-05-11 ENCOUNTER — OFFICE VISIT (OUTPATIENT)
Dept: URGENT CARE | Facility: URGENT CARE | Age: 68
End: 2023-05-11
Payer: COMMERCIAL

## 2023-05-11 VITALS
BODY MASS INDEX: 30.46 KG/M2 | RESPIRATION RATE: 16 BRPM | WEIGHT: 245 LBS | HEART RATE: 91 BPM | OXYGEN SATURATION: 98 % | HEIGHT: 75 IN | DIASTOLIC BLOOD PRESSURE: 80 MMHG | SYSTOLIC BLOOD PRESSURE: 118 MMHG | TEMPERATURE: 98.8 F

## 2023-05-11 DIAGNOSIS — J06.9 VIRAL URI: ICD-10-CM

## 2023-05-11 DIAGNOSIS — R07.0 THROAT PAIN: Primary | ICD-10-CM

## 2023-05-11 LAB
DEPRECATED S PYO AG THROAT QL EIA: NEGATIVE
FLUAV AG SPEC QL IA: NEGATIVE
FLUBV AG SPEC QL IA: NEGATIVE
GROUP A STREP BY PCR: NOT DETECTED

## 2023-05-11 PROCEDURE — 87635 SARS-COV-2 COVID-19 AMP PRB: CPT | Performed by: FAMILY MEDICINE

## 2023-05-11 PROCEDURE — 87804 INFLUENZA ASSAY W/OPTIC: CPT | Performed by: FAMILY MEDICINE

## 2023-05-11 PROCEDURE — 87651 STREP A DNA AMP PROBE: CPT | Performed by: FAMILY MEDICINE

## 2023-05-11 PROCEDURE — 99214 OFFICE O/P EST MOD 30 MIN: CPT | Mod: CS | Performed by: FAMILY MEDICINE

## 2023-05-11 NOTE — PROGRESS NOTES
ASSESSMENT/ PLAN:    Throat pain     - Influenza A & B Antigen - Clinic Collect  - Streptococcus A Rapid Screen w/Reflex to PCR - Clinic Collect  - Symptomatic COVID-19 Virus (Coronavirus) by PCR Nose  - Group A Streptococcus PCR Throat Swab    Viral URI        We discussed that based on the patient's description and physical exam, that a respiratory virus is the most likely cause of the the current illness.  Treatment is symptomatic with OTC cold remedies, acetaminophen, ibuprofen for pain and fever    If he has covid 19 he could be treated with paxlovid     Symptomatic measures encouraged, humidified air, plenty of fluids.  Patient may consider OTC expectorant and/or cough suppressant to treat symptoms.  Return if worsening    ------------------------------------------------------------------------------------------------------------------------------------------------    SUBJECTIVE:  Chief Complaint   Patient presents with     Urgent Care     Congestion, cough x 3-4 days grandson possible strep/flu     Guanako Padilla is a 68 year old male who presents to the clinic today with a chief complaint of cough  and sore throat for 4 day(s).  Patient denies shortness of breath., central chest pain., pleuritic chest pain and wheezing.  His cough is described as occasional, daytime, nightime and nonproductive.    The patient's symptoms are moderate and improving.  Associated symptoms include malaise. The patient's symptoms are exacerbated by no particular triggers  Patient has been using nothing  to improve symptoms.    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/2018     Broken foot     fall     Gastric ulcer     2016, had endoscopy     Shingles      Patient Active Problem List   Diagnosis     Disorder of bursae and tendons in shoulder region     Shoulder impingement syndrome     Internal derangement of shoulder     CARDIOVASCULAR  SCREENING; LDL GOAL LESS THAN 160     Pseudogout of left knee     Peptic ulcer     Primary osteoarthritis of both knees     Iron deficiency anemia due to chronic blood loss     Arthritis of elbow     Incomplete tear of right rotator cuff     Slow transit constipation     Benign prostatic hyperplasia with urinary hesitancy     Smoking     Bilateral carpal tunnel syndrome     Chronic right shoulder pain     H/O shoulder surgery     Urge incontinence       ALLERGIES:  Patient has no known allergies.    MEDs  Bioflavonoid Products (VITAMIN C) CHEW,   Cholecalciferol (VITAMIN D3 PO), Take by mouth daily  magnesium oxide 400 MG CAPS,   omeprazole (PRILOSEC) 20 MG DR capsule, Take 20 mg by mouth  polyethylene glycol (MIRALAX) 17 GM/Dose powder, Take 1 capful by mouth daily    No current facility-administered medications on file prior to visit.      Social History     Tobacco Use     Smoking status: Former     Packs/day: 0.00     Years: 5.00     Pack years: 0.00     Types: Cigarettes     Start date: 2015     Quit date: 2022     Years since quittin.3     Smokeless tobacco: Never   Vaping Use     Vaping status: Not on file   Substance Use Topics     Alcohol use: Yes     Comment: 1 1/2 beers per day       Family History   Problem Relation Age of Onset     Alzheimer Disease Mother         Dementia     Anxiety Disorder Mother      Mental Illness Mother         Alzheimer's     Osteoporosis Mother      Cerebrovascular Disease Father      Diabetes Father      Hyperlipidemia Father      Cancer Maternal Grandfather      Diabetes Brother      Depression Sister      Colon Cancer No family hx of          ROS  CONSTITUTIONAL:NEGATIVE for fever, chills,    INTEGUMENTARY/SKIN: NEGATIVE for worrisome rashes  or lesions  EYES: NEGATIVE for vision changes or irritation  GI: NEGATIVE for nausea, abdominal pain,  or change in bowel habits    OBJECTIVE:  /80   Pulse 91   Temp 98.8  F (37.1  C)   Resp 16   Ht 1.892 m (6'  "2.5\")   Wt 111.1 kg (245 lb)   SpO2 98%   BMI 31.04 kg/m    GENERAL APPEARANCE: alert, mild distress and cooperative,  Rare dry cough  EYES: EOMI,  PERRL, conjunctiva clear  HENT: ear canals and TM's normal.  Nose and mouth without ulcers, erythema or lesions  NECK: supple, nontender, no lymphadenopathy  RESP: lungs clear to auscultation - no rales, rhonchi or wheezes  CV: regular rates and rhythm, normal S1 S2, no murmur noted  NEURO: Normal strength and tone, sensory exam grossly normal,  normal speech and mentation  SKIN: no suspicious lesions or rashes       Results for orders placed or performed in visit on 05/11/23   Influenza A & B Antigen - Clinic Collect     Status: Normal    Specimen: Nose; Swab   Result Value Ref Range    Influenza A antigen Negative Negative    Influenza B antigen Negative Negative    Narrative    Test results must be correlated with clinical data. If necessary, results should be confirmed by a molecular assay or viral culture.   Streptococcus A Rapid Screen w/Reflex to PCR - Clinic Collect     Status: Normal    Specimen: Throat; Swab   Result Value Ref Range    Group A Strep antigen Negative Negative     "

## 2023-05-12 LAB — SARS-COV-2 RNA RESP QL NAA+PROBE: NEGATIVE

## 2023-06-20 ENCOUNTER — OFFICE VISIT (OUTPATIENT)
Dept: SLEEP MEDICINE | Facility: CLINIC | Age: 68
End: 2023-06-20
Attending: INTERNAL MEDICINE
Payer: COMMERCIAL

## 2023-06-20 VITALS
DIASTOLIC BLOOD PRESSURE: 82 MMHG | SYSTOLIC BLOOD PRESSURE: 129 MMHG | HEART RATE: 76 BPM | HEIGHT: 75 IN | WEIGHT: 247 LBS | BODY MASS INDEX: 30.71 KG/M2 | OXYGEN SATURATION: 96 %

## 2023-06-20 DIAGNOSIS — R06.83 SNORING: ICD-10-CM

## 2023-06-20 PROCEDURE — 99205 OFFICE O/P NEW HI 60 MIN: CPT | Performed by: INTERNAL MEDICINE

## 2023-06-20 RX ORDER — IBUPROFEN 200 MG
200 TABLET ORAL EVERY 4 HOURS PRN
COMMUNITY

## 2023-06-20 ASSESSMENT — SLEEP AND FATIGUE QUESTIONNAIRES
HOW LIKELY ARE YOU TO NOD OFF OR FALL ASLEEP IN A CAR, WHILE STOPPED FOR A FEW MINUTES IN TRAFFIC: WOULD NEVER DOZE
HOW LIKELY ARE YOU TO NOD OFF OR FALL ASLEEP WHILE SITTING QUIETLY AFTER LUNCH WITHOUT ALCOHOL: SLIGHT CHANCE OF DOZING
HOW LIKELY ARE YOU TO NOD OFF OR FALL ASLEEP WHEN YOU ARE A PASSENGER IN A CAR FOR AN HOUR WITHOUT A BREAK: SLIGHT CHANCE OF DOZING
HOW LIKELY ARE YOU TO NOD OFF OR FALL ASLEEP WHILE SITTING AND READING: MODERATE CHANCE OF DOZING
HOW LIKELY ARE YOU TO NOD OFF OR FALL ASLEEP WHILE SITTING INACTIVE IN A PUBLIC PLACE: WOULD NEVER DOZE
HOW LIKELY ARE YOU TO NOD OFF OR FALL ASLEEP WHILE SITTING AND TALKING TO SOMEONE: WOULD NEVER DOZE
HOW LIKELY ARE YOU TO NOD OFF OR FALL ASLEEP WHILE WATCHING TV: HIGH CHANCE OF DOZING
HOW LIKELY ARE YOU TO NOD OFF OR FALL ASLEEP WHILE LYING DOWN TO REST IN THE AFTERNOON WHEN CIRCUMSTANCES PERMIT: HIGH CHANCE OF DOZING

## 2023-06-20 NOTE — NURSING NOTE
"Chief Complaint   Patient presents with     Sleep Problem     Sleep with mouth wide open, Snoring       Initial /82   Pulse 76   Ht 1.892 m (6' 2.5\")   Wt 112 kg (247 lb)   SpO2 96%   BMI 31.29 kg/m   Estimated body mass index is 31.29 kg/m  as calculated from the following:    Height as of this encounter: 1.892 m (6' 2.5\").    Weight as of this encounter: 112 kg (247 lb).    Medication Reconciliation: complete  ESS 10  Neck circumference: 49 centimeters.  Jasmin Salguero MA  "

## 2023-06-20 NOTE — PROGRESS NOTES
"[facility]   Outpatient Sleep Medicine Consultation  June 20, 2023      Name: Guanako Padilla MRN# [id number]   Age: 68 year old YOB: 1955     Date of Consultation: June 20, 2023  Consultation is requested by: Roberto Vega MD  [address] [place] [address] Roberto Vega  Primary care provider: Gricel Woo         Reason for Sleep Consult:     Guanako Padilla is a 68 year old male for complaints of loud snoring, daytime sleepiness, witnessed apneas and frequent awakenings for more than 1 years         Assessment and Plan:     Summary Sleep Diagnoses:    Suspected SHREYA  High stop bang score of 7 and very typical symptoms. We discussed the pathophysiology of SHREYA including the cardio-neurologic complications of significant untreated sleep apnea as well as the treatment options including CPAP, oral appliances and hypoglossal nerve stimulator. He will be a good candidate for home sleep study due to the high pre-test probability.     Summary Recommendations:      Orders Placed This Encounter   Procedures     HST-Home Sleep Apnea Test - Noxturnal Returnable       Summary Counseling:  See instructions    Counseling included a comprehensive review of diagnostic and therapeutic strategies as well as risks of inadequate therapy.  Educational materials provided in instructions.             History of Present Illness:   I had the pleasure of seeing Guanako Padilla, who is a 68 year old male who presents for an evaluation of symptoms of loud snoring and frequent nocturnal awakenings. He is here in company of his wife- Myranda.  Patient reports that his wife has noticed over the past year that he tends to \"make a lot of noise while sleeping.\" \"My wife states she cannot sleep with me because i make noises most of the night. I have a pattern of not breathing at times\"  Patient states that he will awaken at night frequently as well for unclear reasons and use the bathroom. He " also snores very loudly and his wife has to sleep in a separate room. There is also witnessed apneas for up to 20 seconds as well as nocturnal gaspings. Patient does report that he has had some issues with fatigue and daytime somnolence over the past 1 year as well. Patient does drive a schoolbus, and he would like to have this issue further evaluated.  He denies any restless leg symptoms and he has no issues with cataplexy, sleep paralysis or increased sleep inertia. He has no issues with falling asleep.    SLEEP-WAKE SCHEDULE:   -Describes themself as a morning person;  prefer to go to sleep at 9:00 PM and wake up at 7:00 AM.     -Naps 1-2 times per week for 30-60 minutes, feels refreshed after naps; takes frequent inadvertant naps.     -ON WEEKDAYS, goes to sleep at 9:00 PM during the week; awakens  5:00 AM with an alarm; falls asleep in 20 minutes; denies difficulty falling asleep.     -ON WEEKENDS, goes to sleep at 11:00 PM.  He wakes up at 6:30 AM with an alarm; falls asleep in 20 minutes.       -Awakens 2-4 times a night for 20 minutes before falling back to sleep; awakens to go to the bathroom with social jet lag of 2 hours.         SCALES       SLEEP APNEA: Stopbang score - 7       INSOMNIA:  Insomnia severity score:        SLEEPINESS: Warren sleepiness scale: 10 [normal < 11]   Drowsy driving/near accidents- No Consequences:No       PHQ9: 0    SLEEP COMPLAINTS:  Cardio-respiratory    Snoring- 5/week  Dyspnea- denies  Morning headaches or confusion-denies  Coexisting Lung disease: denies    Coexisting Heart disease: denies    Does patient have a bed partner: Yes  Has bed partner been sleeping separately because of snoring:  yes            RLS Screen: When you try to relax in the evening or sleep at  night, do you ever have unpleasant, restless feelings in your  legs that can be relieved by walking or movement? denies    Periodic limb movement: denies    Narcolepsy:  denies sudden urges of sleep  attacks    Cataplexy:  denies     Sleep paralysis:  denies      Hallucinations:   denies       Sleep Behaviors:    Leg symptoms/movements: denies    Motor restlessness:denies    Night terrors: denies    Bruxism: denies    Automatic behaviors: none    Other subjective complaints:    Anxiety or rumination denies    Pain and discomfort at  night: denies    Waking up with heart pounding or racing: denies    GERD or aspiration:denies         Parasomnia:   NREM - denies recurrent persistent confusional arousal, night eating, sleep walking or sleep terrors   REM  - denies dream enactment; injuries   Safety: No issues             Medications:     Current Outpatient Medications   Medication Sig     Bioflavonoid Products (VITAMIN C) CHEW      Cholecalciferol (VITAMIN D3 PO) Take by mouth daily     ibuprofen (ADVIL/MOTRIN) 200 MG tablet Take 200 mg by mouth every 4 hours as needed for pain     magnesium oxide 400 MG CAPS      omeprazole (PRILOSEC) 20 MG DR capsule Take 20 mg by mouth     polyethylene glycol (MIRALAX) 17 GM/Dose powder Take 1 capful by mouth daily     No current facility-administered medications for this visit.        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 Covarrubias [facility]     COLONOSCOPY  01/08/2016    Procedure: COLONOSCOPY;  Surgeon: Juan Covarrubias MD;  [facility]: [facility]     ESOPHAGOSCOPY, GASTROSCOPY, DUODENOSCOPY (EGD), COMBINED  01/08/2016    Dr. Satish GARCIA     ESOPHAGOSCOPY, GASTROSCOPY, DUODENOSCOPY (EGD), COMBINED N/A 01/08/2016    Procedure: COMBINED ESOPHAGOSCOPY, GASTROSCOPY, DUODENOSCOPY (EGD), BIOPSY SINGLE OR MULTIPLE;  Surgeon:  "Juan Covarrubias MD;  Location:  GI     ORTHOPEDIC SURGERY Left     left shoulder, rotator cuff repair     ORTHOPEDIC SURGERY Right     wrist tendon repair     ORTHOPEDIC SURGERY Right     right foot fx-metal in foot-ORIF     ORTHOPEDIC SURGERY Right     Right elbow, dx \"little league elbow\", it was cleaned out     ORTHOPEDIC SURGERY Right     Right shoulder, rotator cuff repair            Social History:     Social History     Tobacco Use     Smoking status: Former     Packs/day: 0.00     Years: 5.00     Pack years: 0.00     Types: Cigarettes     Start date: 2015     Quit date: 2022     Years since quittin.4     Smokeless tobacco: Never   Vaping Use     Vaping status: Never Used   Substance Use Topics     Alcohol use: Yes     Comment: 1 1/2 beers per day         Chemical History:     Tobacco: Former      Uses 2 cups/day of coffee. Last caffeine intake is usually before noon.    Supplements for wakefulness: None    EtOH:  None of the patient's responses to the CAGE screening were positive / Negative CAGE score  Recreational Drugs: None     Psych Hx:   PHQ9- 0  Current dangers to self or others:none           Family History:     Family History   Problem Relation Age of Onset     Alzheimer Disease Mother         Dementia     Anxiety Disorder Mother      Mental Illness Mother         Alzheimer's     Osteoporosis Mother      Cerebrovascular Disease Father      Diabetes Father      Hyperlipidemia Father      Cancer Maternal Grandfather      Diabetes Brother      Depression Sister      Colon Cancer No family hx of         Sleep Family Hx:        RLS- None  SHREYA - None  Insomnia - daughter and son  Parasomnia - None         Review of Systems:     A complete 10 point review of systems was negative other than HPI or as commented below:   Guanako Padilla has gained 20-30 pounds in 10 years.               Physical Examination:     /82   Pulse 76   Ht 1.892 m (6' 2.5\")   Wt 112 kg (247 " lb)   SpO2 96%   BMI 31.29 kg/m    Exam:  Constitutional: healthy, alert and no distress  Head: Normocephalic. No masses, lesions, tenderness or abnormalities  ENT: ENT exam normal, no neck nodes or sinus tenderness  Cardiovascular: negative, PMI normal. No lifts, heaves, or thrills. RRR. No murmurs, clicks gallops or rub  Respiratory: negative, Percussion normal. Good diaphragmatic excursion. Lungs clear  Gastrointestinal: Abdomen soft, non-tender. BS normal. No masses, organomegaly  : Deferred  Musculoskeletal: extremities normal- no gross deformities noted, gait normal and normal muscle tone  Skin: no suspicious lesions or rashes  Neurologic: Gait normal. Reflexes normal and symmetric. Sensation grossly WNL.  Psychiatric: mentation appears normal and affect normal/bright            Data: All pertinent previous laboratory data reviewed     No results found for: PH, PHARTERIAL, PO2, [id number], SAT, PCO2, HCO3, BASEEXCESS, ELLE, BEB  Lab Results   Component Value Date    TSH 1.31 03/08/2023    TSH 1.14 12/16/2021     Lab Results   Component Value Date    GLC 87 03/08/2023    GLC 85 12/16/2021     Lab Results   Component Value Date    HGB 13.5 03/08/2023    HGB 13.3 03/06/2020     Lab Results   Component Value Date    BUN 18.2 03/08/2023    BUN 21 12/16/2021    CR 0.97 03/08/2023    CR 0.95 12/16/2021     Lab Results   Component Value Date    AST 26 03/08/2023    AST 19 12/16/2021    ALT 17 03/08/2023    ALT 23 12/16/2021    ALKPHOS 75 03/08/2023    ALKPHOS 72 12/16/2021    BILITOTAL 0.9 03/08/2023    BILITOTAL 0.6 12/16/2021     No results found for: UAMP, UBARB, BENZODIAZEUR, UCANN, UCOC, OPIT, UPCP    Copy to: Gricel Woo MD 6/20/2023     Total time spent with patient: 60 min >50% counseling  RTC 2 weeks after study

## 2023-06-21 ENCOUNTER — ALLIED HEALTH/NURSE VISIT (OUTPATIENT)
Dept: FAMILY MEDICINE | Facility: CLINIC | Age: 68
End: 2023-06-21
Payer: COMMERCIAL

## 2023-06-21 DIAGNOSIS — Z23 NEED FOR SHINGLES VACCINE: Primary | ICD-10-CM

## 2023-06-21 PROCEDURE — 90750 HZV VACC RECOMBINANT IM: CPT

## 2023-06-21 PROCEDURE — 99207 PR NO CHARGE NURSE ONLY: CPT

## 2023-06-21 PROCEDURE — 90471 IMMUNIZATION ADMIN: CPT

## 2023-06-21 ASSESSMENT — ENCOUNTER SYMPTOMS
CONSTIPATION: 1
CONSTITUTIONAL NEGATIVE: 1
FREQUENCY: 1

## 2023-06-21 NOTE — PROGRESS NOTES
Prior to immunization administration, verified patients identity using patient s name and date of birth. Please see Immunization Activity for additional information.     Screening Questionnaire for Adult Immunization    Are you sick today?   No   Do you have allergies to medications, food, a vaccine component or latex?   No   Have you ever had a serious reaction after receiving a vaccination?   No   Do you have a long-term health problem with heart, lung, kidney, or metabolic disease (e.g., diabetes), asthma, a blood disorder, no spleen, complement component deficiency, a cochlear implant, or a spinal fluid leak?  Are you on long-term aspirin therapy?   No   Do you have cancer, leukemia, HIV/AIDS, or any other immune system problem?   No   Do you have a parent, brother, or sister with an immune system problem?   No   In the past 3 months, have you taken medications that affect  your immune system, such as prednisone, other steroids, or anticancer drugs; drugs for the treatment of rheumatoid arthritis, Crohn s disease, or psoriasis; or have you had radiation treatments?   No   Have you had a seizure, or a brain or other nervous system problem?   No   During the past year, have you received a transfusion of blood or blood    products, or been given immune (gamma) globulin or antiviral drug?   No   For women: Are you pregnant or is there a chance you could become       pregnant during the next month?   No   Have you received any vaccinations in the past 4 weeks?   No     Immunization questionnaire answers were all negative.    I have reviewed the following standing orders:   This patient is due and qualifies for the Zoster vaccine.    Click here for Zoster Standing Order    I have reviewed the vaccines inclusion and exclusion criteria; No concerns regarding eligibility.         Patient instructed to remain in clinic for 15 minutes afterwards, and to report any adverse reactions.     Screening performed by Annika  DAGMAR Suresh on 6/21/2023 at 9:48 AM.

## 2023-07-05 ENCOUNTER — HOSPITAL ENCOUNTER (OUTPATIENT)
Dept: ULTRASOUND IMAGING | Facility: CLINIC | Age: 68
Discharge: HOME | End: 2023-07-05
Attending: INTERNAL MEDICINE | Admitting: INTERNAL MEDICINE
Payer: COMMERCIAL

## 2023-07-05 DIAGNOSIS — Z13.6 ENCOUNTER FOR ABDOMINAL AORTIC ANEURYSM SCREENING: ICD-10-CM

## 2023-07-05 PROCEDURE — 76775 US EXAM ABDO BACK WALL LIM: CPT

## 2023-07-24 ASSESSMENT — SLEEP AND FATIGUE QUESTIONNAIRES
HOW LIKELY ARE YOU TO NOD OFF OR FALL ASLEEP WHILE SITTING AND TALKING TO SOMEONE: WOULD NEVER DOZE
HOW LIKELY ARE YOU TO NOD OFF OR FALL ASLEEP WHILE SITTING QUIETLY AFTER LUNCH WITHOUT ALCOHOL: WOULD NEVER DOZE
HOW LIKELY ARE YOU TO NOD OFF OR FALL ASLEEP WHILE SITTING INACTIVE IN A PUBLIC PLACE: SLIGHT CHANCE OF DOZING
HOW LIKELY ARE YOU TO NOD OFF OR FALL ASLEEP IN A CAR, WHILE STOPPED FOR A FEW MINUTES IN TRAFFIC: WOULD NEVER DOZE
HOW LIKELY ARE YOU TO NOD OFF OR FALL ASLEEP WHILE WATCHING TV: WOULD NEVER DOZE
HOW LIKELY ARE YOU TO NOD OFF OR FALL ASLEEP WHEN YOU ARE A PASSENGER IN A CAR FOR AN HOUR WITHOUT A BREAK: WOULD NEVER DOZE
HOW LIKELY ARE YOU TO NOD OFF OR FALL ASLEEP WHILE SITTING AND READING: WOULD NEVER DOZE
HOW LIKELY ARE YOU TO NOD OFF OR FALL ASLEEP WHILE LYING DOWN TO REST IN THE AFTERNOON WHEN CIRCUMSTANCES PERMIT: HIGH CHANCE OF DOZING

## 2023-07-27 ENCOUNTER — OFFICE VISIT (OUTPATIENT)
Dept: SLEEP MEDICINE | Facility: CLINIC | Age: 68
End: 2023-07-27
Payer: COMMERCIAL

## 2023-07-27 DIAGNOSIS — R06.83 SNORING: ICD-10-CM

## 2023-07-27 PROCEDURE — G0399 HOME SLEEP TEST/TYPE 3 PORTA: HCPCS | Performed by: INTERNAL MEDICINE

## 2023-07-27 NOTE — PROGRESS NOTES
Pt is completing a home sleep test. Pt was instructed on how to put on the Noxturnal T3 device and associated equipment before going to bed and given the opportunity to practice putting it on before leaving the sleep center. Pt was reminded to bring the home sleep test kit back to the center tomorrow, at agreed upon time for download and reporting.   Neck circumference: 49 CM / 19 1/4 inches.

## 2023-07-28 ENCOUNTER — DOCUMENTATION ONLY (OUTPATIENT)
Dept: SLEEP MEDICINE | Facility: CLINIC | Age: 68
End: 2023-07-28
Payer: COMMERCIAL

## 2023-07-28 NOTE — PROGRESS NOTES
HST POST-STUDY QUESTIONNAIRE    What time did you go to bed?  10:45 pm  How long do you think it took to fall asleep?  5 min  What time did you wake up to start the day?  7 am  Did you get up during the night at all?  yes  If you woke up, do you remember approximately what time(s)? 5:30 am  Did you have any difficulty with the equipment?  No  Did you us any type of treatment with this study?  None  Was the head of the bed elevated? No  Did you sleep in a recliner?  No  Did you stop using CPAP at least 3 days before this test?  NA  Any other information you'd like us to know? No

## 2023-07-31 NOTE — PROGRESS NOTES
This HSAT was performed using a Noxturnal T3 device which recorded snore, sound, movement activity, body position, nasal pressure, oronasal thermal airflow, pulse, oximetry and both chest and abdominal respiratory effort. HSAT data was restricted to the time patient states they were in bed.     HSAT was scored using 1B 4% hypopnea rule.     HST AHI (Non-PAT): 15.2  Snoring was reported as mild, moderate and intermittent.  Time with SpO2 below 89% was 1.5 minutes.   Overall signal quality was fair flow signal was usable but weak for most of study     Pt will follow up with sleep provider to determine appropriate therapy.

## 2023-08-01 ENCOUNTER — MYC MEDICAL ADVICE (OUTPATIENT)
Dept: PULMONOLOGY | Facility: CLINIC | Age: 68
End: 2023-08-01
Payer: COMMERCIAL

## 2023-08-01 ENCOUNTER — DOCUMENTATION ONLY (OUTPATIENT)
Dept: SLEEP MEDICINE | Facility: CLINIC | Age: 68
End: 2023-08-01
Payer: COMMERCIAL

## 2023-08-01 DIAGNOSIS — G47.33 OSA (OBSTRUCTIVE SLEEP APNEA): Primary | ICD-10-CM

## 2023-08-01 NOTE — PROCEDURES
"HOME SLEEP STUDY INTERPRETATION    Patient: Guanako Padilla  MRN: [id number]  YOB: 1955  Study Date: 7/21/2023  Referring Provider: Roberto Vega MD  Ordering Provider: Lindsay Castañeda MD       Indications for Home Study: Guanako Padilla is a 68 year old male with a history of arthritis who presents with symptoms suggestive of obstructive sleep apnea.    Estimated body mass index is 31.29 kg/m  as calculated from the following:    Height as of 6/20/23: 1.892 m (6' 2.5\").    Weight as of 6/20/23: 112 kg (247 lb).  Total score - Morley: 4 (7/24/2023 12:44 PM)  Total Score: 5 (7/24/2023 12:45 PM)    Data: A full night home sleep study was performed recording the standard physiologic parameters including body position, movement, sound, nasal pressure, thermal oral airflow, chest and abdominal movements with respiratory inductance plethysmography, and oxygen saturation by pulse oximetry. Pulse rate was estimated by oximetry recording. This study was considered adequate based on > 4 hours of quality oximetry and respiratory recording. As specified by the AASM Manual for the Scoring of Sleep and Associated events, version 2.3, Rule VIII.D 1B, 4% oxygen desaturation scoring for hypopneas is used as a standard of care on all home sleep apnea testing.    Analysis Time:  350 minutes    Respiration:   Sleep Associated Hypoxemia: sustained hypoxemia was not present. Baseline oxygen saturation was 96%.  Time with saturation less than or equal to 88% was 1.5 minutes. The lowest oxygen saturation was 83%.   Snoring: Snoring was present.  Respiratory events: The home study revealed a presence of 29 obstructive apneas and 3 mixed and central apneas. There were 55 hypopneas resulting in a combined apnea/hypopnea index [AHI] of 15.2 events per hour.  AHI was 12 per hour supine, 0 per hour prone, 24.9 per hour on left side, and 0 per hour on right side.   Pattern: Excluding events noted above, respiratory rate and " pattern was Normal.    Position: Percent of time spent: supine - 74%, prone - 0%, on left - 24.1%, on right - 0%.    Heart Rate: By pulse oximetry normal rate was noted.     Assessment:   Moderate obstructive sleep apnea.  Sleep associated hypoxemia was not present.    Recommendations:  Consider auto-CPAP at 5-15 cmH2O or oral appliance therapy.  Suggest optimizing sleep hygiene and avoiding sleep deprivation.  Weight management.    Diagnosis Code(s): Obstructive Sleep Apnea G47.33    Lindsay Castañeda MD, August 1, 2023   Diplomate, American Board of Internal Medicine, Sleep Medicine

## 2023-08-04 NOTE — TELEPHONE ENCOUNTER
Patient wanting to start auto CPAP, order pended for provider consideration.  Route back and will message patient on the process.

## 2023-09-15 ENCOUNTER — DOCUMENTATION ONLY (OUTPATIENT)
Dept: SLEEP MEDICINE | Facility: CLINIC | Age: 68
End: 2023-09-15
Payer: COMMERCIAL

## 2023-09-15 DIAGNOSIS — G47.33 OBSTRUCTIVE SLEEP APNEA (ADULT) (PEDIATRIC): Primary | ICD-10-CM

## 2023-09-16 NOTE — PROGRESS NOTES
Patient was offered choice of vendor and chose [facility].  Patient Guanako Padilla was set up at [place] on September 15, 2023. Patient received a Resmed Airsense 10 Pressures were set at  5-15 cm H2O.   Patient s ramp is 5 cm H2O for Auto and FLEX/EPR is EPR, 2.  Patient received a Resmed Mask name: AIRTOUCH F20  Full Face mask size Medium, heated tubing and heated humidifier.  Patient has the following compliance requirements: using and visit requirements  Patient has a follow up on TBD with Dr. Castañeda.    Paola Riley

## 2023-09-19 ENCOUNTER — DOCUMENTATION ONLY (OUTPATIENT)
Dept: SLEEP MEDICINE | Facility: CLINIC | Age: 68
End: 2023-09-19
Payer: COMMERCIAL

## 2023-09-19 NOTE — PROGRESS NOTES
3 day Sleep therapy management telephone visit    Diagnostic AHI:  15.2 HST    Confirmed with patient at time of call- N/A Patient is still interested in STM service.       Message left for patient to return call.        Objective data     Order Settings for PAP  CPAP min     CPAP max              Device settings from machine CPAP min 5     CPAP max 15                      Assessment: Nightly usage over four hours      Action plan: Patient to have 14 day STM visit. Patient has a follow up visit scheduled:   no    Replacement device: No  STM ordered by provider: Yes     Total time spent on accessing and  interpreting remote patient PAP therapy data  10 minutes    Total time spent counseling, coaching  and reviewing PAP therapy data with patient  1 minutes    [id number] no

## 2023-10-03 ENCOUNTER — DOCUMENTATION ONLY (OUTPATIENT)
Dept: SLEEP MEDICINE | Facility: CLINIC | Age: 68
End: 2023-10-03
Payer: COMMERCIAL

## 2023-10-03 NOTE — PROGRESS NOTES
14  DAY STM VISIT    Diagnostic AHI:  15.2 HST    Message left for patient to return call     Assessment: Pt meeting objective benchmarks.      Action plan: waiting for patient to return call.  and pt to have 30 day STM visit.      Device type: Auto-CPAP    PAP settings:  CPAP MIN CPAP MAX 95TH % PRESSURE EPR RESMED SOFT RESPONSE SETTING   5.0 cm  H20 15.0 cm  H20 14 cm  H20  TWO OFF     Mask type:  Nasal Mask    Objective measures: 14 day rolling measures   COMPLIANCE LEAK AHI AVERAGE USE IN MINUTES   100 % 13.12 1.73 401   GOAL >70% GOAL < 24 LPM GOAL <5 GOAL >240      Patient has the following upcoming sleep appts:      Total time spent on accessing and interpreting remote patient PAP therapy data  10 minutes    Total time spent counseling, coaching  and reviewing PAP therapy data with patient  1 minute    [id number]  [id number]  no (3 day STM)

## 2023-10-27 ENCOUNTER — OFFICE VISIT (OUTPATIENT)
Dept: URGENT CARE | Facility: URGENT CARE | Age: 68
End: 2023-10-27
Payer: COMMERCIAL

## 2023-10-27 VITALS
DIASTOLIC BLOOD PRESSURE: 90 MMHG | OXYGEN SATURATION: 97 % | TEMPERATURE: 98 F | BODY MASS INDEX: 29.84 KG/M2 | WEIGHT: 240 LBS | HEIGHT: 75 IN | HEART RATE: 53 BPM | SYSTOLIC BLOOD PRESSURE: 134 MMHG

## 2023-10-27 DIAGNOSIS — S61.411A LACERATION OF RIGHT HAND WITHOUT FOREIGN BODY, INITIAL ENCOUNTER: Primary | ICD-10-CM

## 2023-10-27 PROCEDURE — 12001 RPR S/N/AX/GEN/TRNK 2.5CM/<: CPT | Mod: RT | Performed by: PHYSICIAN ASSISTANT

## 2023-10-27 RX ORDER — PREDNISONE 20 MG/1
TABLET ORAL
COMMUNITY
Start: 2023-03-23

## 2023-10-27 RX ORDER — TRAMADOL HYDROCHLORIDE 50 MG/1
TABLET ORAL
COMMUNITY
Start: 2023-04-26

## 2023-10-27 RX ORDER — HYDROCODONE BITARTRATE AND ACETAMINOPHEN 5; 325 MG/1; MG/1
TABLET ORAL
COMMUNITY
Start: 2023-03-22

## 2023-10-27 RX ORDER — COLCHICINE 0.6 MG/1
TABLET ORAL
COMMUNITY
Start: 2023-03-23

## 2023-10-27 NOTE — PROGRESS NOTES
Assessment & Plan:        ICD-10-CM    1. Laceration of right hand without foreign body, initial encounter  S61.411A             Plan/Clinical Decision Making:    Patient with small 1cm laceration of base of right thumb. Normal neurovascular exam.   Clean edges. No FB.   Discussed closure options with glue vs. Sutures.   Patient decided to pursue glue to close wound.     PLAN:   Wound cleaned by MA.   Glue applied. Bandage applied.  Wound care instructions provided.  Observe for any signs of infection or other problems.          No follow-ups on file.     At the end of the encounter, I discussed results, diagnosis, medications. Discussed red flags for immediate return to clinic/ER, as well as indications for follow up if no improvement. Patient understood and agreed to plan. Patient was stable for discharge.        Mechelle Pabon PA-C on 10/27/2023 at 3:41 PM          Subjective:     HPI:    Galen is a 68 year old male who presents to clinic today for the following health issues:  Chief Complaint   Patient presents with    Urgent Care     Pt accidentally cut his left dorsal hand with a knife while trying to cut tape.      HPI    68 year old male sustained laceration of hand 1 hours ago. Nature of injury: cut with newer knife at home in kitchen. Tetanus vaccination status reviewed: last tetanus booster within 10 years.    Review of Systems  No paresthesias.     Patient Active Problem List   Diagnosis    Disorder of bursae and tendons in shoulder region    Shoulder impingement syndrome    Internal derangement of shoulder    CARDIOVASCULAR SCREENING; LDL GOAL LESS THAN 160    Pseudogout of left knee    Peptic ulcer    Primary osteoarthritis of both knees    Iron deficiency anemia due to chronic blood loss    Arthritis of elbow    Incomplete tear of right rotator cuff    Slow transit constipation    Benign prostatic hyperplasia with urinary hesitancy    Smoking    Bilateral carpal tunnel syndrome    Chronic right  "shoulder pain    H/O shoulder surgery    Urge incontinence        Past Medical History:   Diagnosis Date    Arthritis of elbow 2018    Broken foot     fall    Gastric ulcer     , had endoscopy    Shingles        Social History     Tobacco Use    Smoking status: Former     Packs/day: 0.00     Years: 5.00     Additional pack years: 0.00     Total pack years: 0.00     Types: Cigarettes     Start date: 2015     Quit date: 2022     Years since quittin.8    Smokeless tobacco: Never   Substance Use Topics    Alcohol use: Yes     Comment: 1 1/2 beers per day             Objective:     Vitals:    10/27/23 1507   BP: (!) 134/90   Pulse: 53   Temp: 98  F (36.7  C)   TempSrc: Oral   SpO2: 97%   Weight: 108.9 kg (240 lb)   Height: 1.892 m (6' 2.5\")         Physical Exam   EXAM:   Pleasant, alert, appropriate appearance. NAD.  Laceration 1 cm noted.  Description: clean wound edges, no foreign bodies. Neurovascular and tendon structures are intact.    Results:  No results found for any visits on 10/27/23.      "

## 2023-11-29 ENCOUNTER — OFFICE VISIT (OUTPATIENT)
Dept: SLEEP MEDICINE | Facility: CLINIC | Age: 68
End: 2023-11-29
Payer: COMMERCIAL

## 2023-11-29 DIAGNOSIS — G47.33 OSA (OBSTRUCTIVE SLEEP APNEA): Primary | ICD-10-CM

## 2023-11-29 DIAGNOSIS — E66.811 OBESITY (BMI 30.0-34.9): ICD-10-CM

## 2023-11-29 PROCEDURE — 99213 OFFICE O/P EST LOW 20 MIN: CPT | Performed by: INTERNAL MEDICINE

## 2023-11-29 ASSESSMENT — SLEEP AND FATIGUE QUESTIONNAIRES
HOW LIKELY ARE YOU TO NOD OFF OR FALL ASLEEP WHILE WATCHING TV: SLIGHT CHANCE OF DOZING
HOW LIKELY ARE YOU TO NOD OFF OR FALL ASLEEP WHILE SITTING AND READING: SLIGHT CHANCE OF DOZING
HOW LIKELY ARE YOU TO NOD OFF OR FALL ASLEEP WHILE SITTING QUIETLY AFTER LUNCH WITHOUT ALCOHOL: WOULD NEVER DOZE
HOW LIKELY ARE YOU TO NOD OFF OR FALL ASLEEP WHILE SITTING INACTIVE IN A PUBLIC PLACE: WOULD NEVER DOZE
HOW LIKELY ARE YOU TO NOD OFF OR FALL ASLEEP WHEN YOU ARE A PASSENGER IN A CAR FOR AN HOUR WITHOUT A BREAK: WOULD NEVER DOZE
HOW LIKELY ARE YOU TO NOD OFF OR FALL ASLEEP IN A CAR, WHILE STOPPED FOR A FEW MINUTES IN TRAFFIC: WOULD NEVER DOZE
HOW LIKELY ARE YOU TO NOD OFF OR FALL ASLEEP WHILE SITTING AND TALKING TO SOMEONE: WOULD NEVER DOZE
HOW LIKELY ARE YOU TO NOD OFF OR FALL ASLEEP WHILE LYING DOWN TO REST IN THE AFTERNOON WHEN CIRCUMSTANCES PERMIT: HIGH CHANCE OF DOZING

## 2023-11-29 NOTE — PATIENT INSTRUCTIONS
Your Body mass index is 31.16 kg/m  (pended).  Weight management is a personal decision.  If you are interested in exploring weight loss strategies, the following discussion covers the approaches that may be successful. Body mass index (BMI) is one way to tell whether you are at a healthy weight, overweight, or obese. It measures your weight in relation to your height.  A BMI of 18.5 to 24.9 is in the healthy range. A person with a BMI of 25 to 29.9 is considered overweight, and someone with a BMI of 30 or greater is considered obese. More than two-thirds of American adults are considered overweight or obese.  Being overweight or obese increases the risk for further weight gain. Excess weight may lead to heart disease and diabetes.  Creating and following plans for healthy eating and physical activity may help you improve your health.  Weight control is part of healthy lifestyle and includes exercise, emotional health, and healthy eating habits. Careful eating habits lifelong are the mainstay of weight control. Though there are significant health benefits from weight loss, long-term weight loss with diet alone may be very difficult to achieve- studies show long-term success with dietary management in less than 10% of people. Attaining a healthy weight may be especially difficult to achieve in those with severe obesity. In some cases, medications, devices and surgical management might be considered.  What can you do?  If you are overweight or obese and are interested in methods for weight loss, you should discuss this with your provider.   Consider reducing daily calorie intake by 500 calories.   Keep a food journal.   Avoiding skipping meals, consider cutting portions instead.    Diet combined with exercise helps maintain muscle while optimizing fat loss. Strength training is particularly important for building and maintaining muscle mass. Exercise helps reduce stress, increase energy, and improves fitness.  Increasing exercise without diet control, however, may not burn enough calories to loose weight.     Start walking three days a week 10-20 minutes at a time  Work towards walking thirty minutes five days a week   Eventually, increase the speed of your walking for 1-2 minutes at time    In addition, we recommend that you review healthy lifestyles and methods for weight loss available through the National Institutes of Health patient information sites:  http://win.niddk.nih.gov/publications/index.htm    And look into health and wellness programs that may be available through your health insurance provider, employer, local community center, or joseline club.

## 2023-11-29 NOTE — PROGRESS NOTES
Additional 15 minutes on the date of service was spent performing the following:    -Preparing to see the patient  -Obtaining and/or reviewing separately obtained history   -Ordering medications, tests, or procedures   -Documenting clinical information in the electronic or other health record     Thank you for the opportunity to participate in the care of Guanako Padilla.     He is a 68 year old y/o male patient who comes to the sleep medicine clinic for follow up.  The patient was diagnosed with obstructive sleep apnea at our facility on 07/21/2023 (AHI = 15.2)  The patient has not noticed any changes since starting CPAP. His wife has noticed that he has stopped snoring during sleep and is no longer waking up multiple times in the middle of the night.     Assessment and Plan:  In summary Guanako Padilla is a 68 year old year old male who is here for follow up.  1. SHREYA (obstructive sleep apnea)  I congratulated patient on his excellent CPAP usage.  I will narrow his pressure to a set pressure of 13 CWP.  Return to clinic annually.  - COMPREHENSIVE DME    2. Obesity (BMI 30.0-34.9)  Healthy weigh management is recommended as part of the long term goal to improve SHREYA. I will give the patient a hand out on the topic in the AVS.    Compliance Download data for 30 days:  Compliance: 87%  Pressure setting: APAP 5-15 CWP  95% pressure: 13 CWP  Leak: Minimal  Residual AHI: 1.5 events per hour  Mask Tolerance: Good  Skin irritation: None  DME: [facility]    Lab reviewed: Discussed with patient.    Cpap Fu Template    Question 11/29/2023 10:53 AM CST - Filed by Patient   Do you use a CPAP Machine at home? Yes   Overall, on a scale of 0-10 how would you rate your CPAP? 6   Is your mask comfortable? No   If not, why? it could be a better fit doesnt fit my nose or around my mouthright   Is your mask leaking? Yes   If yes, where do you feel it? chin and cheeks   How many nights per week does the mask leak? 2   Do you notice  snoring with mask on? No   Do you notice gasping arousals with mask on? No   Are you having significant oral or nasal dryness? Yes   Is the pressure setting comfortable? Yes   What type of mask do you use? Full Face Mask   What is your typical bedtime? 9   How long does it take you to go to sleep on PAP therapy? no time at all   What time do you typically get out of bed for the day? 430am   How many hours on average per night are you using PAP therapy? 5to 6   How many hours are you sleeping per night? 5to6   Do you feel well rested in the morning? Yes       ELTON:  ELTON Total Score: 12  Total score - [place]: 5 (11/29/2023 10:55 AM)       Patient Active Problem List   Diagnosis    Disorder of bursae and tendons in shoulder region    Shoulder impingement syndrome    Internal derangement of shoulder    CARDIOVASCULAR SCREENING; LDL GOAL LESS THAN 160    Pseudogout of left knee    Peptic ulcer    Primary osteoarthritis of both knees    Iron deficiency anemia due to chronic blood loss    Arthritis of elbow    Incomplete tear of right rotator cuff    Slow transit constipation    Benign prostatic hyperplasia with urinary hesitancy    Smoking    Bilateral carpal tunnel syndrome    Chronic right shoulder pain    H/O shoulder surgery    Urge incontinence       Past Medical History:   Diagnosis Date    Arthritis of elbow 11/16/2018    Broken foot     fall    Gastric ulcer     2016, had endoscopy    Shingles        Past Surgical History:   Procedure Laterality Date    COLONOSCOPY  01/08/2016    Dr. Covarrubias [facility]    COLONOSCOPY  01/08/2016    Procedure: COLONOSCOPY;  Surgeon: Juan Covarrubias MD;  Location:  GI    ESOPHAGOSCOPY, GASTROSCOPY, DUODENOSCOPY (EGD), COMBINED  01/08/2016    Dr. Satish GARCIA    ESOPHAGOSCOPY, GASTROSCOPY, DUODENOSCOPY (EGD), COMBINED N/A 01/08/2016    Procedure: COMBINED ESOPHAGOSCOPY, GASTROSCOPY, DUODENOSCOPY (EGD), BIOPSY SINGLE OR MULTIPLE;  Surgeon: Juan Covarrubias MD;  Location:  GI     "ORTHOPEDIC SURGERY Left 1976    left shoulder, rotator cuff repair    ORTHOPEDIC SURGERY Right 1980    wrist tendon repair    ORTHOPEDIC SURGERY Right 2011    right foot fx-metal in foot-ORIF    ORTHOPEDIC SURGERY Right 1981    Right elbow, dx \"little league elbow\", it was cleaned out    ORTHOPEDIC SURGERY Right 2008    Right shoulder, rotator cuff repair       Current Outpatient Medications   Medication Sig Dispense Refill    Bioflavonoid Products (VITAMIN C) CHEW       Cholecalciferol (VITAMIN D3 PO) Take by mouth daily      ibuprofen (ADVIL/MOTRIN) 200 MG tablet Take 200 mg by mouth every 4 hours as needed for pain      magnesium oxide 400 MG CAPS       omeprazole (PRILOSEC) 20 MG DR capsule Take 20 mg by mouth      polyethylene glycol (MIRALAX) 17 GM/Dose powder Take 1 capful by mouth daily      colchicine (COLCRYS) 0.6 MG tablet  (Patient not taking: Reported on 10/27/2023)      HYDROcodone-acetaminophen (NORCO) 5-325 MG tablet  (Patient not taking: Reported on 10/27/2023)      predniSONE (DELTASONE) 20 MG tablet  (Patient not taking: Reported on 10/27/2023)      traMADol (ULTRAM) 50 MG tablet  (Patient not taking: Reported on 10/27/2023)         No Known Allergies      Physical Exam:  BP (P) 132/81   Pulse (P) 71   Wt (P) 111.6 kg (246 lb)   SpO2 (P) 98%   BMI (P) 31.16 kg/m    BMI:Body mass index is 31.16 kg/m  (pended).   GEN: NAD, obese  Head: Normocephalic.  EYES: EOMI  Psych: normal mood, normal affect    Labs/Studies:      No results found for: \"PH\", \"PHARTERIAL\", \"PO2\", \"ZU3PBVCMYOI\", \"SAT\", \"PCO2\", \"HCO3\", \"BASEEXCESS\", \"ELLE\", \"BEB\"  Lab Results   Component Value Date    TSH 1.31 03/08/2023    TSH 1.14 12/16/2021     Lab Results   Component Value Date    GLC 87 03/08/2023    GLC 85 12/16/2021     Lab Results   Component Value Date    HGB 13.5 03/08/2023    HGB 13.3 03/06/2020     Lab Results   Component Value Date    BUN 18.2 03/08/2023    BUN 21 12/16/2021    CR 0.97 03/08/2023    CR 0.95 " "12/16/2021     Lab Results   Component Value Date    AST 26 03/08/2023    AST 19 12/16/2021    ALT 17 03/08/2023    ALT 23 12/16/2021    ALKPHOS 75 03/08/2023    ALKPHOS 72 12/16/2021    BILITOTAL 0.9 03/08/2023    BILITOTAL 0.6 12/16/2021     No results found for: \"UAMP\", \"UBARB\", \"BENZODIAZEUR\", \"UCANN\", \"UCOC\", \"OPIT\", \"UPCP\"    Recent Labs   Lab Test 03/08/23  1251 12/16/21  1152    137   POTASSIUM 4.5 4.3   CHLORIDE 105 106   CO2 26 26   ANIONGAP 10 5   GLC 87 85   BUN 18.2 21   CR 0.97 0.95   SARY 9.3 8.7       Ferritin   Date Value Ref Range Status   02/01/2019 223 26 - 388 ng/mL Final       I reviewed the efficacy and compliance report from his device. Data summarized on the HPI and the PAP compliance flow sheet.     Patient verbalized understanding of these issues, agrees with the plan and all questions were answered today. Patient was given an opportuntity to voice any other symptoms or concerns not listed above. Patient did not have any other symptoms or concerns.      Trey Kelley DO  Board Certified in Internal Medicine and Sleep Medicine    (Note created with Dragon voice recognition and unintended spelling errors and word substitutions may occur)     Audio and visual devices were used for this virtual clinic visit with permission from patient.    "

## 2023-11-29 NOTE — NURSING NOTE
"Chief Complaint   Patient presents with    CPAP Follow Up       Initial BP (P) 132/81   Pulse (P) 71   Wt (P) 111.6 kg (246 lb)   SpO2 (P) 98%   BMI (P) 31.16 kg/m   Estimated body mass index is 31.16 kg/m  (pended) as calculated from the following:    Height as of 10/27/23: 1.892 m (6' 2.5\").    Weight as of this encounter: (P) 111.6 kg (246 lb).    Medication Reconciliation: complete    Neck circumference:  inches /  centimeters.    DME:   Yoni Fernandez MA  [facility]    "

## 2023-11-29 NOTE — NURSING NOTE
CPAP pressure changed from auto to set Cpap pressure of 13 per Allie Polo order    Pressure changed remotely     Yoni Fernandez MA  [facility]

## 2023-12-21 ENCOUNTER — TRANSFERRED RECORDS (OUTPATIENT)
Dept: HEALTH INFORMATION MANAGEMENT | Facility: CLINIC | Age: 68
End: 2023-12-21
Payer: COMMERCIAL

## 2024-01-22 ENCOUNTER — OFFICE VISIT (OUTPATIENT)
Dept: URGENT CARE | Facility: URGENT CARE | Age: 69
End: 2024-01-22
Payer: COMMERCIAL

## 2024-01-22 VITALS
RESPIRATION RATE: 18 BRPM | HEART RATE: 103 BPM | OXYGEN SATURATION: 96 % | TEMPERATURE: 97.6 F | DIASTOLIC BLOOD PRESSURE: 80 MMHG | SYSTOLIC BLOOD PRESSURE: 128 MMHG

## 2024-01-22 DIAGNOSIS — H10.9 BACTERIAL CONJUNCTIVITIS OF BOTH EYES: Primary | ICD-10-CM

## 2024-01-22 DIAGNOSIS — B96.89 BACTERIAL CONJUNCTIVITIS OF BOTH EYES: Primary | ICD-10-CM

## 2024-01-22 PROCEDURE — 99213 OFFICE O/P EST LOW 20 MIN: CPT | Performed by: PHYSICIAN ASSISTANT

## 2024-01-22 RX ORDER — TOBRAMYCIN 3 MG/ML
1-2 SOLUTION/ DROPS OPHTHALMIC EVERY 4 HOURS
Qty: 5 ML | Refills: 0 | Status: SHIPPED | OUTPATIENT
Start: 2024-01-22 | End: 2024-01-25

## 2024-01-23 NOTE — PROGRESS NOTES
URGENT CARE VISIT:    SUBJECTIVE:   Guanako Padilla is a 69 year old male who presents complaining of mild both eyes discharge, redness for 1 day(s).  Onset/timing was sudden. Associated signs and symptoms include none. He denies vision changes, headache, sore throat, dry cough, fever, chills, and sinus and nasal congestion. He has tried none with none relief of symptoms.  Patient does not wear contacts. Recent sick contacts include none.    PMH:   Past Medical History:   Diagnosis Date    Arthritis of elbow 11/16/2018    Broken foot     fall    Gastric ulcer     2016, had endoscopy    Shingles      Allergies: Patient has no known allergies.  Medications:   Current Outpatient Medications   Medication Sig Dispense Refill    tobramycin (TOBREX) 0.3 % ophthalmic solution Place 1-2 drops into both eyes every 4 hours for 5 days 5 mL 0    Bioflavonoid Products (VITAMIN C) CHEW       Cholecalciferol (VITAMIN D3 PO) Take by mouth daily      colchicine (COLCRYS) 0.6 MG tablet  (Patient not taking: Reported on 10/27/2023)      HYDROcodone-acetaminophen (NORCO) 5-325 MG tablet  (Patient not taking: Reported on 10/27/2023)      ibuprofen (ADVIL/MOTRIN) 200 MG tablet Take 200 mg by mouth every 4 hours as needed for pain      magnesium oxide 400 MG CAPS       omeprazole (PRILOSEC) 20 MG DR capsule Take 20 mg by mouth      polyethylene glycol (MIRALAX) 17 GM/Dose powder Take 1 capful by mouth daily      predniSONE (DELTASONE) 20 MG tablet  (Patient not taking: Reported on 10/27/2023)      traMADol (ULTRAM) 50 MG tablet  (Patient not taking: Reported on 10/27/2023)       Social History:   Social History     Socioeconomic History    Marital status:      Spouse name: Not on file    Number of children: Not on file    Years of education: Not on file    Highest education level: Not on file   Occupational History    Not on file   Tobacco Use    Smoking status: Former     Packs/day: 0.00     Years: 5.00     Additional pack years:  0.00     Total pack years: 0.00     Types: Cigarettes     Start date: 2015     Quit date: 2022     Years since quittin.0    Smokeless tobacco: Never   Vaping Use    Vaping Use: Never used   Substance and Sexual Activity    Alcohol use: Yes     Comment: 1 1/2 beers per day    Drug use: No    Sexual activity: Yes     Partners: Female     Birth control/protection: None   Other Topics Concern    Parent/sibling w/ CABG, MI or angioplasty before 65F 55M? No   Social History Narrative    , 3 children    Employed for [employer]     Social Determinants of Health     Financial Resource Strain: Not on file   Food Insecurity: Not on file   Transportation Needs: Not on file   Physical Activity: Not on file   Stress: Not on file   Social Connections: Not on file   Interpersonal Safety: Not on file   Housing Stability: Not on file       ROS: ROS otherwise found to be negative except as noted above.    OBJECTIVE:  /80 (BP Location: Right arm, Patient Position: Sitting, Cuff Size: Adult Regular)   Pulse 103   Temp 97.6  F (36.4  C) (Tympanic)   Resp 18   SpO2 96%   General: WDWN in NAD.   Head: normocephalic, atraumatic   Eyes: bilateral conjunctival injection with mattering.  Nose: No rhinorrhea.  Cardiac: RRR without murmurs, rubs, or gallops.  Respiratory: LCTAB without adventitious sounds. Non-labored breathing.  Lymph nodes: no cervical adenopathy.  Skin: no rashes or lesions      ASSESSMENT:     ICD-10-CM    1. Bacterial conjunctivitis of both eyes  H10.9 tobramycin (TOBREX) 0.3 % ophthalmic solution    B96.89            PLAN:  Patient Instructions   Patient was educated on the natural course of condition.  Apply medication as directed. Conservative measures discussed including applying warm washcloth.  Avoid contamination by frequent handwashing and avoid touching eyes. See your primary care provider if symptoms worsen or do not improve in 7 days. Seek emergency care if you develop vision loss,  severe eye pain, swelling, or redness.      Patient verbalized understanding and is agreeable to plan. The patient was discharged ambulatory and in stable condition.    Judy Singer PA-C on 1/22/2024 at 7:43 PM

## 2024-01-23 NOTE — PATIENT INSTRUCTIONS
Patient was educated on the natural course of condition.  Apply medication as directed. Conservative measures discussed including applying warm washcloth.  Avoid contamination by frequent handwashing and avoid touching eyes. See your primary care provider if symptoms worsen or do not improve in 7 days. Seek emergency care if you develop vision loss, severe eye pain, swelling, or redness.

## 2024-01-25 ENCOUNTER — MYC MEDICAL ADVICE (OUTPATIENT)
Dept: INTERNAL MEDICINE | Facility: CLINIC | Age: 69
End: 2024-01-25
Payer: COMMERCIAL

## 2024-01-25 DIAGNOSIS — H10.9 BACTERIAL CONJUNCTIVITIS OF BOTH EYES: ICD-10-CM

## 2024-01-25 DIAGNOSIS — B96.89 BACTERIAL CONJUNCTIVITIS OF BOTH EYES: ICD-10-CM

## 2024-01-25 RX ORDER — TOBRAMYCIN 3 MG/ML
1-2 SOLUTION/ DROPS OPHTHALMIC EVERY 4 HOURS
Qty: 5 ML | Refills: 0 | Status: SHIPPED | OUTPATIENT
Start: 2024-01-25

## 2024-01-25 NOTE — TELEPHONE ENCOUNTER
Please review [employer] message and advise if new bottle of tobramycin drops can be sent to the pharmacy. Ordered by  for pink eye, but patient lost bottle and requesting replacement.     Janie Diaz RN  [facility]

## 2024-01-27 NOTE — ED TRIAGE NOTES
Pt arrives via EMS after fall during work. Pt is  and slipped on ice. Fall was witnessed, no LOC, no blood thinners. Laceration on back of head. Pt having issues with short term memory recall per EMS. A&Ox4 upon ED arrival. Complaining of head pain where laceration is and right sided neck pain. No midline neck pain.        chest pain

## 2024-02-01 ENCOUNTER — PATIENT OUTREACH (OUTPATIENT)
Dept: CARE COORDINATION | Facility: CLINIC | Age: 69
End: 2024-02-01
Payer: COMMERCIAL

## 2024-02-15 ENCOUNTER — PATIENT OUTREACH (OUTPATIENT)
Dept: CARE COORDINATION | Facility: CLINIC | Age: 69
End: 2024-02-15
Payer: COMMERCIAL

## 2024-10-21 ENCOUNTER — APPOINTMENT (OUTPATIENT)
Dept: CT IMAGING | Facility: CLINIC | Age: 69
DRG: 184 | End: 2024-10-21
Attending: EMERGENCY MEDICINE
Payer: COMMERCIAL

## 2024-10-21 ENCOUNTER — HOSPITAL ENCOUNTER (INPATIENT)
Facility: CLINIC | Age: 69
LOS: 4 days | Discharge: HOME HEALTH | DRG: 184 | End: 2024-10-26
Attending: EMERGENCY MEDICINE | Admitting: INTERNAL MEDICINE
Payer: COMMERCIAL

## 2024-10-21 ENCOUNTER — APPOINTMENT (OUTPATIENT)
Dept: GENERAL RADIOLOGY | Facility: CLINIC | Age: 69
DRG: 184 | End: 2024-10-21
Attending: EMERGENCY MEDICINE
Payer: COMMERCIAL

## 2024-10-21 DIAGNOSIS — S22.42XA CLOSED FRACTURE OF MULTIPLE RIBS OF LEFT SIDE, INITIAL ENCOUNTER: ICD-10-CM

## 2024-10-21 DIAGNOSIS — D50.0 IRON DEFICIENCY ANEMIA DUE TO CHRONIC BLOOD LOSS: ICD-10-CM

## 2024-10-21 DIAGNOSIS — N39.41 URGE INCONTINENCE: ICD-10-CM

## 2024-10-21 DIAGNOSIS — M67.919 DISORDER OF BURSAE AND TENDONS IN SHOULDER REGION: ICD-10-CM

## 2024-10-21 DIAGNOSIS — M17.0 PRIMARY OSTEOARTHRITIS OF BOTH KNEES: ICD-10-CM

## 2024-10-21 DIAGNOSIS — G89.29 CHRONIC RIGHT SHOULDER PAIN: Primary | ICD-10-CM

## 2024-10-21 DIAGNOSIS — K59.01 SLOW TRANSIT CONSTIPATION: ICD-10-CM

## 2024-10-21 DIAGNOSIS — M71.9 DISORDER OF BURSAE AND TENDONS IN SHOULDER REGION: ICD-10-CM

## 2024-10-21 DIAGNOSIS — M25.511 CHRONIC RIGHT SHOULDER PAIN: Primary | ICD-10-CM

## 2024-10-21 DIAGNOSIS — S32.009A CLOSED FRACTURE OF TRANSVERSE PROCESS OF LUMBAR VERTEBRA, INITIAL ENCOUNTER (H): ICD-10-CM

## 2024-10-21 DIAGNOSIS — G56.03 BILATERAL CARPAL TUNNEL SYNDROME: ICD-10-CM

## 2024-10-21 LAB
ANION GAP SERPL CALCULATED.3IONS-SCNC: 17 MMOL/L (ref 7–15)
BASOPHILS # BLD AUTO: 0 10E3/UL (ref 0–0.2)
BASOPHILS NFR BLD AUTO: 1 %
BUN SERPL-MCNC: 24.7 MG/DL (ref 8–23)
CALCIUM SERPL-MCNC: 8.8 MG/DL (ref 8.8–10.4)
CHLORIDE SERPL-SCNC: 109 MMOL/L (ref 98–107)
CREAT SERPL-MCNC: 1.01 MG/DL (ref 0.67–1.17)
EGFRCR SERPLBLD CKD-EPI 2021: 81 ML/MIN/1.73M2
EOSINOPHIL # BLD AUTO: 0.1 10E3/UL (ref 0–0.7)
EOSINOPHIL NFR BLD AUTO: 1 %
ERYTHROCYTE [DISTWIDTH] IN BLOOD BY AUTOMATED COUNT: 14.3 % (ref 10–15)
GLUCOSE SERPL-MCNC: 157 MG/DL (ref 70–99)
HCO3 SERPL-SCNC: 19 MMOL/L (ref 22–29)
HCT VFR BLD AUTO: 38.8 % (ref 40–53)
HGB BLD-MCNC: 12.1 G/DL (ref 13.3–17.7)
HOLD SPECIMEN: NORMAL
HOLD SPECIMEN: NORMAL
IMM GRANULOCYTES # BLD: 0 10E3/UL
IMM GRANULOCYTES NFR BLD: 1 %
LYMPHOCYTES # BLD AUTO: 0.7 10E3/UL (ref 0.8–5.3)
LYMPHOCYTES NFR BLD AUTO: 13 %
MCH RBC QN AUTO: 27.9 PG (ref 26.5–33)
MCHC RBC AUTO-ENTMCNC: 31.2 G/DL (ref 31.5–36.5)
MCV RBC AUTO: 90 FL (ref 78–100)
MONOCYTES # BLD AUTO: 0.5 10E3/UL (ref 0–1.3)
MONOCYTES NFR BLD AUTO: 9 %
NEUTROPHILS # BLD AUTO: 4.3 10E3/UL (ref 1.6–8.3)
NEUTROPHILS NFR BLD AUTO: 76 %
NRBC # BLD AUTO: 0 10E3/UL
NRBC BLD AUTO-RTO: 0 /100
PLATELET # BLD AUTO: 245 10E3/UL (ref 150–450)
POTASSIUM SERPL-SCNC: 4.2 MMOL/L (ref 3.4–5.3)
RBC # BLD AUTO: 4.33 10E6/UL (ref 4.4–5.9)
SODIUM SERPL-SCNC: 145 MMOL/L (ref 135–145)
TROPONIN T SERPL HS-MCNC: 30 NG/L
WBC # BLD AUTO: 5.7 10E3/UL (ref 4–11)

## 2024-10-21 PROCEDURE — 99285 EMERGENCY DEPT VISIT HI MDM: CPT | Mod: 25

## 2024-10-21 PROCEDURE — 96375 TX/PRO/DX INJ NEW DRUG ADDON: CPT | Mod: 59

## 2024-10-21 PROCEDURE — 73130 X-RAY EXAM OF HAND: CPT | Mod: RT

## 2024-10-21 PROCEDURE — 74177 CT ABD & PELVIS W/CONTRAST: CPT

## 2024-10-21 PROCEDURE — 80048 BASIC METABOLIC PNL TOTAL CA: CPT | Performed by: EMERGENCY MEDICINE

## 2024-10-21 PROCEDURE — 84484 ASSAY OF TROPONIN QUANT: CPT | Performed by: EMERGENCY MEDICINE

## 2024-10-21 PROCEDURE — 250N000011 HC RX IP 250 OP 636: Performed by: EMERGENCY MEDICINE

## 2024-10-21 PROCEDURE — 93005 ELECTROCARDIOGRAM TRACING: CPT | Mod: 59

## 2024-10-21 PROCEDURE — 250N000009 HC RX 250: Performed by: EMERGENCY MEDICINE

## 2024-10-21 PROCEDURE — 99223 1ST HOSP IP/OBS HIGH 75: CPT | Performed by: INTERNAL MEDICINE

## 2024-10-21 PROCEDURE — 0HQFXZZ REPAIR RIGHT HAND SKIN, EXTERNAL APPROACH: ICD-10-PCS | Performed by: EMERGENCY MEDICINE

## 2024-10-21 PROCEDURE — 85025 COMPLETE CBC W/AUTO DIFF WBC: CPT | Performed by: EMERGENCY MEDICINE

## 2024-10-21 PROCEDURE — 36415 COLL VENOUS BLD VENIPUNCTURE: CPT | Performed by: EMERGENCY MEDICINE

## 2024-10-21 PROCEDURE — 96374 THER/PROPH/DIAG INJ IV PUSH: CPT | Mod: 59

## 2024-10-21 PROCEDURE — 70450 CT HEAD/BRAIN W/O DYE: CPT

## 2024-10-21 PROCEDURE — 12002 RPR S/N/AX/GEN/TRNK2.6-7.5CM: CPT

## 2024-10-21 RX ORDER — ONDANSETRON 2 MG/ML
4 INJECTION INTRAMUSCULAR; INTRAVENOUS EVERY 30 MIN PRN
Status: DISCONTINUED | OUTPATIENT
Start: 2024-10-21 | End: 2024-10-22

## 2024-10-21 RX ORDER — LIDOCAINE 40 MG/G
CREAM TOPICAL
Status: DISCONTINUED | OUTPATIENT
Start: 2024-10-21 | End: 2024-10-25

## 2024-10-21 RX ORDER — MORPHINE SULFATE 4 MG/ML
4 INJECTION, SOLUTION INTRAMUSCULAR; INTRAVENOUS
Status: DISCONTINUED | OUTPATIENT
Start: 2024-10-21 | End: 2024-10-22

## 2024-10-21 RX ORDER — IOPAMIDOL 755 MG/ML
500 INJECTION, SOLUTION INTRAVASCULAR ONCE
Status: COMPLETED | OUTPATIENT
Start: 2024-10-21 | End: 2024-10-21

## 2024-10-21 RX ADMIN — ONDANSETRON 4 MG: 2 INJECTION INTRAMUSCULAR; INTRAVENOUS at 21:05

## 2024-10-21 RX ADMIN — Medication: at 21:10

## 2024-10-21 RX ADMIN — MORPHINE SULFATE 4 MG: 4 INJECTION, SOLUTION INTRAMUSCULAR; INTRAVENOUS at 21:05

## 2024-10-21 RX ADMIN — HYDROMORPHONE HYDROCHLORIDE 1 MG: 1 INJECTION, SOLUTION INTRAMUSCULAR; INTRAVENOUS; SUBCUTANEOUS at 23:19

## 2024-10-21 RX ADMIN — SODIUM CHLORIDE 65 ML: 9 INJECTION, SOLUTION INTRAVENOUS at 21:51

## 2024-10-21 RX ADMIN — Medication: at 20:52

## 2024-10-21 RX ADMIN — IOPAMIDOL 100 ML: 755 INJECTION, SOLUTION INTRAVENOUS at 21:51

## 2024-10-21 ASSESSMENT — ACTIVITIES OF DAILY LIVING (ADL)
ADLS_ACUITY_SCORE: 33
ADLS_ACUITY_SCORE: 35

## 2024-10-21 ASSESSMENT — COLUMBIA-SUICIDE SEVERITY RATING SCALE - C-SSRS
2. HAVE YOU ACTUALLY HAD ANY THOUGHTS OF KILLING YOURSELF IN THE PAST MONTH?: NO
1. IN THE PAST MONTH, HAVE YOU WISHED YOU WERE DEAD OR WISHED YOU COULD GO TO SLEEP AND NOT WAKE UP?: NO
6. HAVE YOU EVER DONE ANYTHING, STARTED TO DO ANYTHING, OR PREPARED TO DO ANYTHING TO END YOUR LIFE?: NO

## 2024-10-22 ENCOUNTER — APPOINTMENT (OUTPATIENT)
Dept: PHYSICAL THERAPY | Facility: CLINIC | Age: 69
DRG: 184 | End: 2024-10-22
Attending: INTERNAL MEDICINE
Payer: COMMERCIAL

## 2024-10-22 ENCOUNTER — APPOINTMENT (OUTPATIENT)
Dept: CARDIOLOGY | Facility: CLINIC | Age: 69
DRG: 184 | End: 2024-10-22
Attending: INTERNAL MEDICINE
Payer: COMMERCIAL

## 2024-10-22 PROBLEM — S32.009A CLOSED FRACTURE OF TRANSVERSE PROCESS OF LUMBAR VERTEBRA, INITIAL ENCOUNTER (H): Status: ACTIVE | Noted: 2024-10-22

## 2024-10-22 PROBLEM — S22.42XA CLOSED FRACTURE OF MULTIPLE RIBS OF LEFT SIDE, INITIAL ENCOUNTER: Status: ACTIVE | Noted: 2024-10-22

## 2024-10-22 LAB
ANION GAP SERPL CALCULATED.3IONS-SCNC: 11 MMOL/L (ref 7–15)
ATRIAL RATE - MUSE: 100 BPM
BUN SERPL-MCNC: 26.3 MG/DL (ref 8–23)
CALCIUM SERPL-MCNC: 8.3 MG/DL (ref 8.8–10.4)
CHLORIDE SERPL-SCNC: 109 MMOL/L (ref 98–107)
CREAT SERPL-MCNC: 0.92 MG/DL (ref 0.67–1.17)
DIASTOLIC BLOOD PRESSURE - MUSE: NORMAL MMHG
EGFRCR SERPLBLD CKD-EPI 2021: 90 ML/MIN/1.73M2
ERYTHROCYTE [DISTWIDTH] IN BLOOD BY AUTOMATED COUNT: 14.3 % (ref 10–15)
GLUCOSE SERPL-MCNC: 141 MG/DL (ref 70–99)
HCO3 SERPL-SCNC: 22 MMOL/L (ref 22–29)
HCT VFR BLD AUTO: 36.9 % (ref 40–53)
HGB BLD-MCNC: 11.5 G/DL (ref 13.3–17.7)
INTERPRETATION ECG - MUSE: NORMAL
LVEF ECHO: NORMAL
MAGNESIUM SERPL-MCNC: 1.9 MG/DL (ref 1.7–2.3)
MCH RBC QN AUTO: 28.6 PG (ref 26.5–33)
MCHC RBC AUTO-ENTMCNC: 31.2 G/DL (ref 31.5–36.5)
MCV RBC AUTO: 92 FL (ref 78–100)
P AXIS - MUSE: 56 DEGREES
PHOSPHATE SERPL-MCNC: 3.6 MG/DL (ref 2.5–4.5)
PLATELET # BLD AUTO: 194 10E3/UL (ref 150–450)
POTASSIUM SERPL-SCNC: 4.5 MMOL/L (ref 3.4–5.3)
PR INTERVAL - MUSE: 172 MS
PTH-INTACT SERPL-MCNC: 55 PG/ML (ref 15–65)
QRS DURATION - MUSE: 78 MS
QT - MUSE: 380 MS
QTC - MUSE: 490 MS
R AXIS - MUSE: 35 DEGREES
RBC # BLD AUTO: 4.02 10E6/UL (ref 4.4–5.9)
SODIUM SERPL-SCNC: 142 MMOL/L (ref 135–145)
SYSTOLIC BLOOD PRESSURE - MUSE: NORMAL MMHG
T AXIS - MUSE: -2 DEGREES
TROPONIN T SERPL HS-MCNC: 27 NG/L
TROPONIN T SERPL HS-MCNC: 28 NG/L
TROPONIN T SERPL HS-MCNC: 33 NG/L
VENTRICULAR RATE- MUSE: 100 BPM
VIT D+METAB SERPL-MCNC: 29 NG/ML (ref 20–50)
WBC # BLD AUTO: 7.8 10E3/UL (ref 4–11)

## 2024-10-22 PROCEDURE — 250N000013 HC RX MED GY IP 250 OP 250 PS 637: Performed by: INTERNAL MEDICINE

## 2024-10-22 PROCEDURE — 120N000001 HC R&B MED SURG/OB

## 2024-10-22 PROCEDURE — 36415 COLL VENOUS BLD VENIPUNCTURE: CPT | Performed by: INTERNAL MEDICINE

## 2024-10-22 PROCEDURE — 36415 COLL VENOUS BLD VENIPUNCTURE: CPT | Performed by: EMERGENCY MEDICINE

## 2024-10-22 PROCEDURE — 84484 ASSAY OF TROPONIN QUANT: CPT | Performed by: EMERGENCY MEDICINE

## 2024-10-22 PROCEDURE — 99222 1ST HOSP IP/OBS MODERATE 55: CPT | Performed by: SURGERY

## 2024-10-22 PROCEDURE — 85027 COMPLETE CBC AUTOMATED: CPT | Performed by: INTERNAL MEDICINE

## 2024-10-22 PROCEDURE — 80048 BASIC METABOLIC PNL TOTAL CA: CPT | Performed by: INTERNAL MEDICINE

## 2024-10-22 PROCEDURE — 82306 VITAMIN D 25 HYDROXY: CPT | Performed by: INTERNAL MEDICINE

## 2024-10-22 PROCEDURE — 99232 SBSQ HOSP IP/OBS MODERATE 35: CPT | Performed by: INTERNAL MEDICINE

## 2024-10-22 PROCEDURE — 99222 1ST HOSP IP/OBS MODERATE 55: CPT | Performed by: PHYSICIAN ASSISTANT

## 2024-10-22 PROCEDURE — 999N000208 ECHOCARDIOGRAM COMPLETE

## 2024-10-22 PROCEDURE — 255N000002 HC RX 255 OP 636: Performed by: INTERNAL MEDICINE

## 2024-10-22 PROCEDURE — 83970 ASSAY OF PARATHORMONE: CPT | Performed by: INTERNAL MEDICINE

## 2024-10-22 PROCEDURE — 84484 ASSAY OF TROPONIN QUANT: CPT | Performed by: INTERNAL MEDICINE

## 2024-10-22 PROCEDURE — 97530 THERAPEUTIC ACTIVITIES: CPT | Mod: GP | Performed by: PHYSICAL THERAPIST

## 2024-10-22 PROCEDURE — 97116 GAIT TRAINING THERAPY: CPT | Mod: GP | Performed by: PHYSICAL THERAPIST

## 2024-10-22 PROCEDURE — 97161 PT EVAL LOW COMPLEX 20 MIN: CPT | Mod: GP | Performed by: PHYSICAL THERAPIST

## 2024-10-22 PROCEDURE — 250N000011 HC RX IP 250 OP 636: Performed by: INTERNAL MEDICINE

## 2024-10-22 PROCEDURE — 84100 ASSAY OF PHOSPHORUS: CPT | Performed by: INTERNAL MEDICINE

## 2024-10-22 PROCEDURE — 83735 ASSAY OF MAGNESIUM: CPT | Performed by: INTERNAL MEDICINE

## 2024-10-22 PROCEDURE — 93306 TTE W/DOPPLER COMPLETE: CPT | Mod: 26 | Performed by: INTERNAL MEDICINE

## 2024-10-22 RX ORDER — ONDANSETRON 4 MG/1
4 TABLET, ORALLY DISINTEGRATING ORAL EVERY 6 HOURS PRN
Status: DISCONTINUED | OUTPATIENT
Start: 2024-10-22 | End: 2024-10-26 | Stop reason: HOSPADM

## 2024-10-22 RX ORDER — GABAPENTIN 100 MG/1
100 CAPSULE ORAL 3 TIMES DAILY
Status: DISCONTINUED | OUTPATIENT
Start: 2024-10-22 | End: 2024-10-22

## 2024-10-22 RX ORDER — ACETAMINOPHEN 325 MG/1
975 TABLET ORAL EVERY 8 HOURS
Status: DISCONTINUED | OUTPATIENT
Start: 2024-10-22 | End: 2024-10-26 | Stop reason: HOSPADM

## 2024-10-22 RX ORDER — PROCHLORPERAZINE MALEATE 5 MG/1
5 TABLET ORAL EVERY 6 HOURS PRN
Status: DISCONTINUED | OUTPATIENT
Start: 2024-10-22 | End: 2024-10-26 | Stop reason: HOSPADM

## 2024-10-22 RX ORDER — HYDROMORPHONE HCL IN WATER/PF 6 MG/30 ML
0.4 PATIENT CONTROLLED ANALGESIA SYRINGE INTRAVENOUS
Status: DISCONTINUED | OUTPATIENT
Start: 2024-10-22 | End: 2024-10-26 | Stop reason: HOSPADM

## 2024-10-22 RX ORDER — LIDOCAINE 40 MG/G
CREAM TOPICAL
Status: DISCONTINUED | OUTPATIENT
Start: 2024-10-22 | End: 2024-10-26 | Stop reason: HOSPADM

## 2024-10-22 RX ORDER — IBUPROFEN 400 MG/1
400 TABLET, FILM COATED ORAL EVERY 6 HOURS PRN
Status: DISCONTINUED | OUTPATIENT
Start: 2024-10-22 | End: 2024-10-26 | Stop reason: HOSPADM

## 2024-10-22 RX ORDER — KETOROLAC TROMETHAMINE 15 MG/ML
15 INJECTION, SOLUTION INTRAMUSCULAR; INTRAVENOUS ONCE
Status: COMPLETED | OUTPATIENT
Start: 2024-10-22 | End: 2024-10-22

## 2024-10-22 RX ORDER — GABAPENTIN 300 MG/1
300 CAPSULE ORAL 3 TIMES DAILY
Status: DISCONTINUED | OUTPATIENT
Start: 2024-10-22 | End: 2024-10-26 | Stop reason: HOSPADM

## 2024-10-22 RX ORDER — POLYETHYLENE GLYCOL 3350 17 G/17G
17 POWDER, FOR SOLUTION ORAL 2 TIMES DAILY PRN
Status: DISCONTINUED | OUTPATIENT
Start: 2024-10-22 | End: 2024-10-26 | Stop reason: HOSPADM

## 2024-10-22 RX ORDER — HYDROMORPHONE HCL IN WATER/PF 6 MG/30 ML
0.2 PATIENT CONTROLLED ANALGESIA SYRINGE INTRAVENOUS
Status: DISCONTINUED | OUTPATIENT
Start: 2024-10-22 | End: 2024-10-26 | Stop reason: HOSPADM

## 2024-10-22 RX ORDER — AMOXICILLIN 250 MG
1 CAPSULE ORAL 2 TIMES DAILY PRN
Status: DISCONTINUED | OUTPATIENT
Start: 2024-10-22 | End: 2024-10-23

## 2024-10-22 RX ORDER — ATORVASTATIN CALCIUM 10 MG/1
10 TABLET, FILM COATED ORAL AT BEDTIME
Status: DISCONTINUED | OUTPATIENT
Start: 2024-10-22 | End: 2024-10-26 | Stop reason: HOSPADM

## 2024-10-22 RX ORDER — OXYCODONE HYDROCHLORIDE 10 MG/1
10 TABLET ORAL EVERY 4 HOURS PRN
Status: DISCONTINUED | OUTPATIENT
Start: 2024-10-22 | End: 2024-10-26 | Stop reason: HOSPADM

## 2024-10-22 RX ORDER — LIDOCAINE 4 G/G
1 PATCH TOPICAL
Status: DISCONTINUED | OUTPATIENT
Start: 2024-10-22 | End: 2024-10-26 | Stop reason: HOSPADM

## 2024-10-22 RX ORDER — ONDANSETRON 2 MG/ML
4 INJECTION INTRAMUSCULAR; INTRAVENOUS EVERY 6 HOURS PRN
Status: DISCONTINUED | OUTPATIENT
Start: 2024-10-22 | End: 2024-10-26 | Stop reason: HOSPADM

## 2024-10-22 RX ORDER — CALCIUM CARBONATE 500 MG/1
1000 TABLET, CHEWABLE ORAL 4 TIMES DAILY PRN
Status: DISCONTINUED | OUTPATIENT
Start: 2024-10-22 | End: 2024-10-26 | Stop reason: HOSPADM

## 2024-10-22 RX ORDER — AMOXICILLIN 250 MG
2 CAPSULE ORAL 2 TIMES DAILY PRN
Status: DISCONTINUED | OUTPATIENT
Start: 2024-10-22 | End: 2024-10-23

## 2024-10-22 RX ORDER — ATORVASTATIN CALCIUM 10 MG/1
10 TABLET, FILM COATED ORAL AT BEDTIME
COMMUNITY
Start: 2024-10-08

## 2024-10-22 RX ORDER — OXYCODONE HYDROCHLORIDE 5 MG/1
5 TABLET ORAL EVERY 4 HOURS PRN
Status: DISCONTINUED | OUTPATIENT
Start: 2024-10-22 | End: 2024-10-26 | Stop reason: HOSPADM

## 2024-10-22 RX ORDER — METHOCARBAMOL 500 MG/1
500 TABLET, FILM COATED ORAL 3 TIMES DAILY
Status: COMPLETED | OUTPATIENT
Start: 2024-10-22 | End: 2024-10-24

## 2024-10-22 RX ORDER — PANTOPRAZOLE SODIUM 40 MG/1
40 TABLET, DELAYED RELEASE ORAL
Status: DISCONTINUED | OUTPATIENT
Start: 2024-10-22 | End: 2024-10-26 | Stop reason: HOSPADM

## 2024-10-22 RX ORDER — PROCHLORPERAZINE 25 MG
12.5 SUPPOSITORY, RECTAL RECTAL EVERY 12 HOURS PRN
Status: DISCONTINUED | OUTPATIENT
Start: 2024-10-22 | End: 2024-10-26 | Stop reason: HOSPADM

## 2024-10-22 RX ADMIN — GABAPENTIN 100 MG: 100 CAPSULE ORAL at 08:06

## 2024-10-22 RX ADMIN — OXYCODONE HYDROCHLORIDE 5 MG: 5 TABLET ORAL at 16:41

## 2024-10-22 RX ADMIN — IBUPROFEN 400 MG: 400 TABLET, FILM COATED ORAL at 10:27

## 2024-10-22 RX ADMIN — LIDOCAINE 1 PATCH: 4 PATCH TOPICAL at 09:07

## 2024-10-22 RX ADMIN — SENNOSIDES AND DOCUSATE SODIUM 2 TABLET: 50; 8.6 TABLET ORAL at 14:50

## 2024-10-22 RX ADMIN — ACETAMINOPHEN 325MG 975 MG: 325 TABLET ORAL at 08:06

## 2024-10-22 RX ADMIN — PERFLUTREN 2 ML: 6.52 INJECTION, SUSPENSION INTRAVENOUS at 08:35

## 2024-10-22 RX ADMIN — KETOROLAC TROMETHAMINE 15 MG: 15 INJECTION, SOLUTION INTRAMUSCULAR; INTRAVENOUS at 01:19

## 2024-10-22 RX ADMIN — GABAPENTIN 100 MG: 100 CAPSULE ORAL at 01:20

## 2024-10-22 RX ADMIN — ACETAMINOPHEN 325MG 975 MG: 325 TABLET ORAL at 01:19

## 2024-10-22 RX ADMIN — ACETAMINOPHEN 325MG 975 MG: 325 TABLET ORAL at 16:43

## 2024-10-22 RX ADMIN — HYDROMORPHONE HYDROCHLORIDE 0.2 MG: 0.2 INJECTION, SOLUTION INTRAMUSCULAR; INTRAVENOUS; SUBCUTANEOUS at 14:50

## 2024-10-22 RX ADMIN — IBUPROFEN 400 MG: 400 TABLET, FILM COATED ORAL at 17:43

## 2024-10-22 RX ADMIN — OXYCODONE HYDROCHLORIDE 5 MG: 5 TABLET ORAL at 20:53

## 2024-10-22 RX ADMIN — PANTOPRAZOLE SODIUM 40 MG: 40 TABLET, DELAYED RELEASE ORAL at 05:43

## 2024-10-22 RX ADMIN — OXYCODONE HYDROCHLORIDE 5 MG: 5 TABLET ORAL at 01:20

## 2024-10-22 RX ADMIN — ATORVASTATIN CALCIUM 10 MG: 10 TABLET, FILM COATED ORAL at 20:54

## 2024-10-22 RX ADMIN — METHOCARBAMOL 500 MG: 500 TABLET ORAL at 14:43

## 2024-10-22 RX ADMIN — GABAPENTIN 300 MG: 300 CAPSULE ORAL at 16:43

## 2024-10-22 RX ADMIN — METHOCARBAMOL 500 MG: 500 TABLET ORAL at 01:20

## 2024-10-22 RX ADMIN — HYDROMORPHONE HYDROCHLORIDE 0.4 MG: 0.2 INJECTION, SOLUTION INTRAMUSCULAR; INTRAVENOUS; SUBCUTANEOUS at 09:08

## 2024-10-22 RX ADMIN — OXYCODONE HYDROCHLORIDE 5 MG: 5 TABLET ORAL at 12:13

## 2024-10-22 RX ADMIN — GABAPENTIN 300 MG: 300 CAPSULE ORAL at 20:53

## 2024-10-22 RX ADMIN — METHOCARBAMOL 500 MG: 500 TABLET ORAL at 08:06

## 2024-10-22 RX ADMIN — METHOCARBAMOL 500 MG: 500 TABLET ORAL at 20:53

## 2024-10-22 ASSESSMENT — ACTIVITIES OF DAILY LIVING (ADL)
ADLS_ACUITY_SCORE: 29
ADLS_ACUITY_SCORE: 35
ADLS_ACUITY_SCORE: 29
ADLS_ACUITY_SCORE: 35
ADLS_ACUITY_SCORE: 31
ADLS_ACUITY_SCORE: 29

## 2024-10-22 NOTE — ED NOTES
[facility]  ED Nurse Handoff Report    ED Chief complaint: Fall  . ED Diagnosis:   Final diagnoses:   Closed fracture of multiple ribs of left side, initial encounter   Closed fracture of transverse process of lumbar vertebra, initial encounter (H)       Allergies: No Known Allergies    Code Status: Full Code    Activity level - Baseline/Home:  independent.  Activity Level - Current:   in bed and standby.   Lift room needed: No.   Bariatric: No   Needed: No   Isolation: No.   Infection: Not Applicable.     Respiratory status: Room air    Vital Signs (within 30 minutes):   Vitals:    10/21/24 2100 10/21/24 2102 10/21/24 2315 10/21/24 2330   BP: (!) 149/101 (!) 149/101 129/84    Pulse: 95 95  87   Resp:       Temp:       TempSrc:       SpO2:  96%  91%       Cardiac Rhythm:  ,      Pain level:    Patient confused: No.   Patient Falls Risk: assistive device/personal items within reach, activity supervised, mobility aid in reach, room door open, and toileting schedule implemented.   Elimination Status: Unable to void     Patient Report - Initial Complaint: Fall after feeling dizzy .   Focused Assessment: Guanako Padilla is a 69 year old male who presents after a fall. The patient reports he fell backwards and twisted, landing on his left side. Reports falling down 7-8 stairs. Reports falling due to dizziness. Patient reports he might have broke a rib. Daughter reports the patient has been having problems with dizziness while standing and walked recently. Reports he has been seen by neuro/psych and has not received a diagnoses. Daughter reports needing to help the patient walk to the car after the fall. Patient denies loss of consciousness. Denies lightheadedness or seizure prior to fall.  Reports of laceration to right hand and cuts to right arm/elbow. Reports hitting head, patient has received negative head CT. Denies use of blood thinners. Daughter reports of aneurism to his liver.       Abnormal Results:   Labs Ordered and Resulted from Time of ED Arrival to Time of ED Departure   BASIC METABOLIC PANEL - Abnormal       Result Value    Sodium 145      Potassium 4.2      Chloride 109 (*)     Carbon Dioxide (CO2) 19 (*)     Anion Gap 17 (*)     Urea Nitrogen 24.7 (*)     Creatinine 1.01      GFR Estimate 81      Calcium 8.8      Glucose 157 (*)    TROPONIN T, HIGH SENSITIVITY - Abnormal    Troponin T, High Sensitivity 30 (*)    CBC WITH PLATELETS AND DIFFERENTIAL - Abnormal    WBC Count 5.7      RBC Count 4.33 (*)     Hemoglobin 12.1 (*)     Hematocrit 38.8 (*)     MCV 90      MCH 27.9      MCHC 31.2 (*)     RDW 14.3      Platelet Count 245      % Neutrophils 76      % Lymphocytes 13      % Monocytes 9      % Eosinophils 1      % Basophils 1      % Immature Granulocytes 1      NRBCs per 100 WBC 0      Absolute Neutrophils 4.3      Absolute Lymphocytes 0.7 (*)     Absolute Monocytes 0.5      Absolute Eosinophils 0.1      Absolute Basophils 0.0      Absolute Immature Granulocytes 0.0      Absolute NRBCs 0.0     TROPONIN T, HIGH SENSITIVITY        XR Hand Right G/E 3 Views   Final Result   IMPRESSION: No acute fracture or dislocation. 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 Relatively mild osteoarthritis of the triscaphe    joint and a few other interphalangeal joints of the fingers has also worsened.      CT Chest/Abdomen/Pelvis w Contrast   Final Result   IMPRESSION:    1.  Acute, nondisplaced to mildly displaced fractures of the lateral left third through eighth ribs.   2.  Acute, nondisplaced fracture of the left L2 transverse process.   3.  Trace left pleural fluid. No pneumothorax.   4.  Unchanged ascending thoracic aortic aneurysm, measuring 4.2 cm.   5.  Mild hepatic steatosis.   6.  Nonobstructing left nephrolithiasis.         Head CT w/o contrast   Final Result   IMPRESSION:      1.  No evidence of acute intracranial hemorrhage or mass effect.          Treatments provided: See MAR   Family Comments: Daughter   OBS brochure/video discussed/provided to patient:  N/A  ED Medications:   Medications   lidocaine 1 % 0.1-1 mL (has no administration in time range)   lidocaine (LMX4) cream (has no administration in time range)   sodium chloride (PF) 0.9% PF flush 3 mL (3 mLs Intracatheter Not Given 10/21/24 2344)   sodium chloride (PF) 0.9% PF flush 3 mL (has no administration in time range)   morphine (PF) injection 4 mg (4 mg Intravenous $Given 10/21/24 2105)   ondansetron (ZOFRAN) injection 4 mg (4 mg Intravenous $Given 10/21/24 2105)   lido-EPINEPHrine-tetracaine (LET) topical gel GEL ( Topical $Given 10/21/24 2052)   lido-EPINEPHrine-tetracaine (LET) topical gel GEL ( Topical $Given 10/21/24 2110)   iopamidol (ISOVUE-370) solution 500 mL (100 mLs Intravenous $Given 10/21/24 2151)   CT scan flush (65 mLs Intravenous $Given 10/21/24 2151)   HYDROmorphone (DILAUDID) injection 1 mg (1 mg Intravenous $Given 10/21/24 2319)       Drips infusing:  No  For the majority of the shift this patient was Green.   Interventions performed were NA    Sepsis treatment initiated: No    Cares/treatment/interventions/medications to be completed following ED care: No spinal precaution     ED Nurse Name: Diamond Negrete RN  12:31 AM    RECEIVING UNIT ED HANDOFF REVIEW    Above ED Nurse Handoff Report was reviewed: Yes  Reviewed by: Allison Matamoros RN on October 22, 2024 at 1:14 AM   I Tamar called the ED to inform them the note was read: Yes

## 2024-10-22 NOTE — PROGRESS NOTES
[facility]    Medicine Progress Note - Hospitalist Service    Date of Admission:  10/21/2024    Assessment & Plan     Guanako Padilla is a 69 year old male with PMH including cognitive impairment with ataxia and dizziness, HLD, gastric ulcer, BPH, SHREYA, and obesity who presents after a fall at home.  He was walking down the stairs on his deck when he slipped and fell backwards landing on his back and left side.  He fell down about 7-8 stairs.  He was reporting left lateral chest pain.  He had a right hand laceration and also cut to the arm and elbow.  He did hit his head, but did not have loss conscious.  He was having some dizziness prior to falling which is not unusual for him.  His daughter says that he had a bad fall about 2 years ago and since then he said some worsening cognitive impairment and persistent dizziness and balance difficulties.  They are following with neurology in the outpatient setting.  He currently has improved pain control following IV opiates that he received.  He does have increased pain with taking a deep breath, but does not feel short of breath.     History obtained from patient, patient's daughter secondary to his cognitive impairment, medical record, and from Dr. Ortiz in the emergency department.  Blood pressure 131/96, heart rate 92, temperature 98.3  F, oxygen 96% on room air.  WBC 5.7, hemoglobin 12.0, platelet count 245.  Sodium is 145, potassium 4.2, chloride 109, bicarb 19, BUN 24, creatinine 1.01, glucose 157.  Troponin T elevated 30 with repeat 27.  EKG shows NSR with no ST elevation or depression.  Right hand x-ray shows osteoarthritis.  CT of the head is unremarkable.  CT chest/ab/pelvis shows mildly displaced lateral left 3rd-8th rib fractures, acute L2 transverse process fracture, left nonobstructive kidney stone, 4.2 cm ascending aortic aneurysm.  He received Zofran, 4 mg IV morphine, and 1 mg IV Dilaudid.  Pain control is improved.  Dr. Ortiz did  speak with thoracic surgery who recommended admission here for pain control and no current indication for plating procedure.     Continuing pain control, likely needs 2-3 days.       Fall  Acute traumatic left 3rd-8th rib fractures  Acute traumatic L2 transverse process fracture: He was walking down the stairs on the deck at home and is a little bit dizzy and he is falling landing on his back and left side.  He fell down about 7-8 stairs.  He is reporting left lateral chest pain that is worse with movement and taking a deep breath.  He did hit his head, but did not have loss of consciousness.  CT head unremarkable.  X-ray of the right hand shows osteoarthritis.  CT chest/ab/pelvis shows mildly displaced left lateral 3rd-8th rib fractures and a L2 transverse process fracture.  Contacted general surgery for trauma eval contacted thoracic surgery who recommended admission here for pain control and no current indication for bleeding.  -Consult general surgery for trauma evaluation  -Consult thoracic surgery given the multiple rib fractures in case plating will be needed fails  -Consult anesthesia for nerve block  -Consult neurosurgery for the L2 fracture  -Consult PT/OT/SW, fall precautions  -Scheduled acetaminophen 975 mg 3 times daily, scheduled gabapentin 100 mg 3 times daily, scheduled methocarbamol 500 mg 3 times daily, place lidocaine patch, give 1 dose IV Toradol now, ibuprofen 400 mg every 6 hours as needed, oxycodone 5 mg for moderate and 10 mg for severe pain every 4 hours as needed, IV Dilaudid 0.2 mg for moderate and 0.4 mg for severe acute pain every 2 hours as needed  -Daily chest x-ray ordered x 2  -Incentive spirometer, continuous pulse ox     Cognitive impairment  Ataxia: He is currently following with neurology as an outpatient for worsening cognitive impairment and ataxia with dizziness with ambulation.  This has been progressive ever since a bad fall couple years ago per his daughter.  -Fall  "precautions     HLD: Resume PTA atorvastatin 10 mg at bedtime.     Elevated troponin: Troponin T was elevated at 30 repeat downtrending to 27.  EKG shows NSR with no ST elevation or depression.  Chest pain is secondary to his rib fractures.  Has trace-1+ bilateral extremity edema on exam.     -TTE grossly normal.     Ascending thoracic aortic aneurysm: 4.2 cm ascending thoracic aortic aneurysm seen on CT.     History of gastric ulcer: Ordered pantoprazole daily in case he is receiving any as needed ibuprofen.     BPH: Monitor for any retention.  Not currently on Flomax.     SHREYA: Unclear if using CPAP or not, may need to add this while here if not meeting incentive spirometry goals per the rib fracture order sent.     Obesity: BMI 31.        Diet: Combination Diet Regular Diet Adult    DVT Prophylaxis: Pneumatic Compression Devices  Pal[place]ios Catheter: Not present  Lines: None     Cardiac Monitoring: ACTIVE order. Indication: Syncope- low cardiac risk (24 hours)  Code Status: Full Code      Clinically Significant Risk Factors Present on Admission          # Hyperchloremia: Highest Cl = 109 mmol/L in last 2 days, will monitor as appropriate                      # Overweight: Estimated body mass index is 29.45 kg/m  as calculated from the following:    Height as of this encounter: 1.88 m (6' 2\").    Weight as of this encounter: 104.1 kg (229 lb 6.4 oz).              Disposition Plan     Medically Ready for Discharge: Anticipated in 2-4 Days             Cristian Garcia MD  Hospitalist Service  [facility]  Securely message with [employer] (more info)  Text page via [facility] Paging/Directory   ______________________________________________________________________    Interval History     Feels ok, pain meds working  Updated family at the bedside.  Encouraging deep breathing      Physical Exam   Vital Signs: Temp: 97.6  F (36.4  C) Temp src: Oral BP: 121/70 Pulse: 68   Resp: 20 SpO2: 95 % O2 Device: " None (Room air)    Weight: 229 lbs 6.4 oz    General: Alert, awake, no acute distress.  HEENT: NC/AT, eyes anicteric, external occular movements intact, face symmetric.  Cardiac: RRR, S1, S2.  No murmurs appreciated.  Pulmonary: Normal chest rise, normal work of breathing.  Lungs CTA BL  Abdomen: soft, non-tender, non-distended.  Bowel Sounds Present.  No guarding.  Extremities: no deformities.  Warm, well perfused.  Skin: no rashes or lesions noted.  Warm and Dry.  Neuro: No focal deficits noted.  Speech clear.  Coordination and strength grossly normal.  Psych: Appropriate affect.      Medical Decision Making       40 MINUTES SPENT BY ME on the date of service doing chart review, history, exam, documentation & further activities per the note.      Data   Imaging results reviewed over the past 24 hrs:   Recent Results (from the past 24 hour(s))   Head CT w/o contrast    Narrative    EXAM: CT HEAD W/O CONTRAST  LOCATION: [facility]  DATE: 10/21/2024    INDICATION: head injury after fall  COMPARISON: None.  TECHNIQUE: Routine CT Head without IV contrast. Multiplanar reformats. Dose reduction techniques were used.    FINDINGS:  INTRACRANIAL CONTENTS: No evidence of acute intracranial hemorrhage or mass effect. Scattered foci of decreased attenuation within the cerebral hemispheric white matter which are nonspecific, though most commonly ascribed to chronic small vessel ischemic   disease. The ventricles and sulci are prominent consistent with mild brain parenchymal volume loss. Gray-white matter differentiation is maintained. The basilar cisterns are patent.    VISUALIZED ORBITS/SINUSES/MASTOIDS: The globes are unremarkable. The partially imaged paranasal sinuses, mastoid air cells and middle ear cavities are unremarkable.     BONES/SOFT TISSUES: The visualized skull base and calvarium are unremarkable.      Impression    IMPRESSION:    1.  No evidence of acute intracranial hemorrhage or mass  effect.   CT Chest/Abdomen/Pelvis w Contrast    Narrative    EXAM: CT CHEST/ABDOMEN/PELVIS W CONTRAST  LOCATION: [facility]  DATE: 10/21/2024    INDICATION: Left-sided chest pain after fall downstairs.  COMPARISON: CT chest 8/6/2024.  TECHNIQUE: CT scan of the chest, abdomen, and pelvis was performed following injection of IV contrast. Multiplanar reformats were obtained. Dose reduction techniques were used.   CONTRAST: 100mL Isovue 370    FINDINGS: Patient was imaged with arm(s) at side, limiting study quality.    LUNGS AND PLEURA: Trachea and large airways are patent. No focal airspace consolidation. Mild dependent atelectatic change.     No suspicious pulmonary nodule.    No pneumothorax.    Trace left pleural fluid.     MEDIASTINUM/AXILLAE: Unchanged mild mediastinal lymphadenopathy. No appreciable hilar lymphadenopathy.     Thoracic esophagus is unremarkable.    No axillary lymphadenopathy.    Mild bilateral gynecomastia.    Ascending thoracic aortic aneurysm, measuring 4.2 cm, unchanged. Mild atheromatous disease.    Mild cardiomegaly. No pericardial effusion.    CORONARY ARTERY CALCIFICATION: Mild.    HEPATOBILIARY: Mild hepatic steatosis.    Gallbladder is normal.    No intrahepatic or intrahepatic biliary ductal dilatation.    PANCREAS: Enhances normally. No peripancreatic inflammatory fat stranding.    SPLEEN: Enhances normally. Normal size.    ADRENAL GLANDS: Normal.    KIDNEYS: Both kidneys enhance symmetrically, without hydronephrosis. Benign appearing renal cyst(s), in addition to other low attenuation renal lesion(s), which are too small to characterize, though statistically likely to represent simple cysts; no   further follow up recommended. Additional small parapelvic cysts at the inferior pole left kidney.    Tiny nonobstructing stones of the inferior pole left kidney.    Urinary bladder is unremarkable.    PELVIC ORGANS: No suspicious abnormality.    BOWEL: No evidence of  acute gastrointestinal inflammation or obstruction. Normal appendix.    No intraperitoneal free fluid or free air. Small bilateral fat-containing inguinal hernias.    LYMPH NODES: No suspicious abdominal or pelvic lymphadenopathy.    VASCULATURE: No abdominal aortic aneurysm. Mild atheromatous disease.    MUSCULOSKELETAL: Acute, nondisplaced to mildly displaced fractures of the lateral left third through eighth ribs. Multiple old bilateral anterolateral rib fracture deformities. Old fracture deformity of the posterolateral right 12th rib. Acute,   nondisplaced fracture of the left L2 transverse process. Chronic, mild superior endplate height loss at the T3 vertebral body. Elastofibroma dorsi. Left glenohumeral joint effusion.    OTHER: No additionally significant abnormalities.      Impression    IMPRESSION:   1.  Acute, nondisplaced to mildly displaced fractures of the lateral left third through eighth ribs.  2.  Acute, nondisplaced fracture of the left L2 transverse process.  3.  Trace left pleural fluid. No pneumothorax.  4.  Unchanged ascending thoracic aortic aneurysm, measuring 4.2 cm.  5.  Mild hepatic steatosis.  6.  Nonobstructing left nephrolithiasis.     XR Hand Right G/E 3 Views    Narrative    EXAM: XR HAND RIGHT G/E 3 VIEWS  LOCATION: [facility]  DATE: 10/21/2024    INDICATION: fall, pain  COMPARISON: 7/4/2011.      Impression    IMPRESSION: No acute fracture or dislocation. 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 Relatively mild osteoarthritis of the triscaphe   joint and a few other interphalangeal joints of the fingers has also worsened.   Echocardiogram Complete   Result Value    LVEF  55-60%    Narrative    [id number]  [id number]  [id number]^CLAIRE^ANTONIETTA^[facility]  Echocardiography Laboratory  [address] [place] [address]     Name: KAROL SCHAFER  MRN: [id number]  : 1955  Study Date: 10/22/2024 08:04 AM  Age: 69 yrs  Gender: Male  Patient Location: [facility]  Reason For Study: Dizziness  Ordering Physician: ANTONIETTA RANGEL  Referring Physician: Roberto Vega  Performed By: Brooklynn Rowland     BSA: 2.3 m2  Height: 74 in  Weight: 229 lb  HR: 71  BP: 118/79 mmHg  ______________________________________________________________________________  Procedure  Complete Portable Echo Adult. Definity (NDC #78646-049) given intravenously.  Technically difficult study.Extremely difficult acoustic windows despite the  use of contrast for endcardial border definition.  ______________________________________________________________________________  Interpretation Summary     No cardiac cause of dizziness seen in this study. The study was technically  difficult.  ______________________________________________________________________________  Left Ventricle  The left ventricle is normal in size. There is normal left ventricular wall  thickness. The visual ejection fraction is 55-60%. Left ventricular diastolic  function is indeterminate.     Right Ventricle  The right ventricle is normal in structure, function and size.     Atria  The left atrium is mildly dilated. Right atrial size is normal.     Mitral Valve  There is mild mitral annular calcification. The mitral valve leaflets are  mildly thickened. There is trace to mild mitral regurgitation.     Tricuspid Valve  Normal tricuspid valve. There is trace tricuspid regurgitation. Right  ventricular systolic pressure is normal.     Aortic Valve  There is trivial trileaflet aortic sclerosis.     Pulmonic Valve  The pulmonic valve is not well visualized.     Vessels  Aortic root dilatation is present. Ascending aorta dilatation is present.  Dilation of the inferior vena cava is present with abnormal respiratory  variation in diameter.     Pericardium  There is no pericardial  effusion.     Rhythm  Sinus rhythm was noted.  ______________________________________________________________________________  MMode/2D Measurements & Calculations  IVSd: 1.6 cm     LVIDd: 5.5 cm  LVIDs: 3.8 cm  LVPWd: 1.4 cm  IVC diam: 2.6 cm  FS: 30.1 %  LV mass(C)d: 369.8 grams  LV mass(C)dI: 160.6 grams/m2  Ao root diam: 4.1 cm  LVOT diam: 2.4 cm  LVOT area: 4.4 cm2  Ao root diam index Ht(cm/m): 2.2  Ao root diam index BSA (cm/m2): 1.8  LA Volume (BP): 84.9 ml  LA Volume Index (BP): 36.9 ml/m2  RV Base: 3.3 cm     RWT: 0.51  TAPSE: 1.7 cm     Doppler Measurements & Calculations  MV E max jose: 82.7 cm/sec  MV A max jose: 45.0 cm/sec  MV E/A: 1.8  MV max PG: 3.0 mmHg  MV mean P.3 mmHg  MV V2 VTI: 20.4 cm  MVA(VTI): 4.0 cm2  MV dec slope: 489.3 cm/sec2  MV dec time: 0.17 sec  Ao V2 max: 112.4 cm/sec  Ao max P.0 mmHg  Ao V2 mean: 88.1 cm/sec  Ao mean PG: 3.3 mmHg  Ao V2 VTI: 24.0 cm  YENY(I,D): 3.4 cm2  YENY(V,D): 3.6 cm2  LV V1 max PG: 3.3 mmHg  LV V1 max: 91.3 cm/sec  LV V1 VTI: 18.4 cm  SV(LVOT): 81.6 ml  SI(LVOT): 35.4 ml/m2  PA V2 max: 72.1 cm/sec  PA max P.1 mmHg  PA acc time: 0.11 sec  AV Jose Ratio (DI): 0.81  YENY Index (cm2/m2): 1.5  E/E' av.3  Lateral E/e': 8.8  Medial E/e': 15.8     RV S Jose: 9.5 cm/sec     ______________________________________________________________________________  Report approved by: Marva Godfrey 10/22/2024 11:02 AM

## 2024-10-22 NOTE — ED TRIAGE NOTES
Here for fall due to dizziness. Stated that [facility] down the deck stairs of about 7-8 steps. C/o right rib pain. Did bump his left side of head. No LOC. No midline tenderness. Denies any blood thinners. Unable to get back up after the fall. ABCs intact.     Triage Assessment (Adult)       Row Name 10/21/24 1935          Triage Assessment    Airway WDL WDL        Respiratory WDL    Respiratory WDL WDL        Cardiac WDL    Cardiac WDL WDL

## 2024-10-22 NOTE — PLAN OF CARE
"Goal Outcome Evaluation:      Plan of Care Reviewed With: patient, spouse    Overall Patient Progress: improvingOverall Patient Progress: improving    Outcome Evaluation: pain improved w/ interventions    To Do:  End of Shift Summary  For vital signs and complete assessments, please see documentation flowsheets.     Pertinent assessments: VSS. Oriented x4, forgetful. Dressing applied to Left elbow r/t a skin tear, currently CDI. Pain elevated, scheduled and PRN meds provided, see mar. Decreased pain w/ interventions per patient. Bruising noted to right side. SL. Ls clear, IS encouraged and used by patient.    Major Shift Events: Tele d/c'd, echo completed.     Treatment Plan: Pain management, Encourage activity and use of IS, Discharge TBD.    Bedside Nurse: Kali Grubbs RN       Problem: Adult Inpatient Plan of Care  Goal: Plan of Care Review  Description: The Plan of Care Review/Shift note should be completed every shift.  The Outcome Evaluation is a brief statement about your assessment that the patient is improving, declining, or no change.  This information will be displayed automatically on your shift  note.  Outcome: Progressing  Flowsheets (Taken 10/22/2024 1513)  Outcome Evaluation: pain improved w/ interventions  Plan of Care Reviewed With:   patient   spouse  Overall Patient Progress: improving  Goal: Patient-Specific Goal (Individualized)  Description: You can add care plan individualizations to a care plan. Examples of Individualization might be:  \"Parent requests to be called daily at 9am for status\", \"I have a hard time hearing out of my right ear\", or \"Do not touch me to wake me up as it startles  me\".  Outcome: Progressing  Goal: Absence of Hospital-Acquired Illness or Injury  Outcome: Progressing  Intervention: Identify and Manage Fall Risk  Recent Flowsheet Documentation  Taken 10/22/2024 0918 by Kali Grubbs, RN  Safety Promotion/Fall Prevention:   activity supervised   assistive " device/personal items within reach   clutter free environment maintained   increased rounding and observation   increase visualization of patient   lighting adjusted   mobility aid in reach   nonskid shoes/slippers when out of bed   patient and family education   room near nurse's station   room organization consistent   safety round/check completed   supervised activity   treat reversible contributory factors   treat underlying cause  Intervention: Prevent Skin Injury  Recent Flowsheet Documentation  Taken 10/22/2024 0918 by Kali Grubbs RN  Body Position:   position changed independently   supine, head elevated  Intervention: Prevent Infection  Recent Flowsheet Documentation  Taken 10/22/2024 0918 by Kali Grubbs RN  Infection Prevention:   cohorting utilized   rest/sleep promoted   single patient room provided  Goal: Optimal Comfort and Wellbeing  Outcome: Progressing  Intervention: Monitor Pain and Promote Comfort  Recent Flowsheet Documentation  Taken 10/22/2024 1026 by Kali Grubbs RN  Pain Management Interventions: medication (see MAR)  Taken 10/22/2024 0907 by Kali Grubbs RN  Pain Management Interventions: medication (see MAR)  Taken 10/22/2024 0805 by Kali Grubbs RN  Pain Management Interventions: medication (see MAR)  Goal: Readiness for Transition of Care  Outcome: Progressing     Problem: Pain Acute  Goal: Optimal Pain Control and Function  Outcome: Progressing  Intervention: Develop Pain Management Plan  Recent Flowsheet Documentation  Taken 10/22/2024 1026 by Kali Grubbs RN  Pain Management Interventions: medication (see MAR)  Taken 10/22/2024 0907 by Kali Grubbs RN  Pain Management Interventions: medication (see MAR)  Taken 10/22/2024 0805 by Kali Grubbs RN  Pain Management Interventions: medication (see MAR)  Intervention: Prevent or Manage Pain  Recent Flowsheet Documentation  Taken 10/22/2024 0918 by Kali Grubbs RN  Medication Review/Management: medications  reviewed     Problem: Skin Injury Risk Increased  Goal: Skin Health and Integrity  Outcome: Progressing  Intervention: Optimize Skin Protection  Recent Flowsheet Documentation  Taken 10/22/2024 0918 by Kali Grubbs, RN  Activity Management: activity encouraged  Head of Bed (HOB) Positioning: HOB at 20 degrees

## 2024-10-22 NOTE — CONSULTS
CLINICAL NUTRITION SERVICES  -  ASSESSMENT NOTE      Recommendations Ordered by Registered Dietitian (RD):   Continue current diet order per MD    Nutrition supplements if intake begins to decrease    Recommend consistent intakes and encouraging protein with every meal    Will monitor PO intakes acutely   MALNUTRITION:  % Weight Loss:  Weight loss does not meet criteria for malnutrition   % Intake:  Pt was unable to tell me his usual intake  Subcutaneous Fat Loss:  None observed  Muscle Loss:  None observed  Fluid Retention:  2+ BLE    Malnutrition Diagnosis: Patient does not meet two of the above criteria necessary for diagnosing malnutrition          REASON FOR ASSESSMENT  Guanako Padilla is a 69 year old male seen by Registered Dietitian for Provider Order - Trauma patient with multiple rib fractures     PMH: cognitive impairment with ataxia and dizziness, HLD, gastric ulcer, BPH, SHREYA, and obesity     Per EMR, pt presented to ED on 10/21/24 after a fall at home. He was walking down the stairs on his deck when he slipped and fell backwards landing on his back and left side. He fell down about 7-8 stairs. He was reporting left lateral chest pain. He had a right hand laceration and also cut to the arm and elbow. He did hit his head, but did not have loss conscious. He was having some dizziness prior to falling which is not unusual for him. His daughter says that he had a bad fall about 2 years ago and since then he said some worsening cognitive impairment and persistent dizziness and balance difficulties       NUTRITION HISTORY    - Information obtained from chart review. Attempted to meet with pt at bedside. He was unable to recall details of his diet, including how frequently he ate or what he usually he eats. He was able to tell me that he has a good appetite usually.   - Allergies: NKFA    I spoke with him briefly about being there to let him know about nutrition recommendations to help with bone strengthening.  "He seemed a little overwhelmed, so I left the packet of information containing recommendations for protein, vitamin D, and calcium intakes. I also encouraged him to keep eating frequently.   He agreed to an NFPE. Findings above.  I made pt aware of supplement options in the event that his appetite does begin to decrease.     Pt verbalized understanding of all information presented and denied further questions at this time.    RD will follow as appropriate.      CURRENT NUTRITION ORDERS  Diet Order:     Regular    Current Intake/Tolerance:  No intakes recorded in flowsheet documentation.    Per [facility], 2 well portioned meals ordered so far today.      NUTRITION FOCUSED PHYSICAL ASSESSMENT FOR DIAGNOSING MALNUTRITION)  Yes         Observed:    See Above    Obtained from Chart/Interdisciplinary Team:  2+ BLE     ANTHROPOMETRICS  Height: 6' 2\"  Weight: 229 lbs 6.4 oz  Body mass index is 29.45 kg/m .  Weight Status:  Overweight BMI 25-29.9  Weight History: Slight weight loss, but not enough to meet criteria.  Wt Readings from Last 10 Encounters:   10/22/24 104.1 kg (229 lb 6.4 oz)   11/29/23 (P) 111.6 kg (246 lb)   10/27/23 108.9 kg (240 lb)   06/20/23 112 kg (247 lb)   05/11/23 111.1 kg (245 lb)   04/14/23 111.1 kg (245 lb)   03/02/23 112.5 kg (248 lb)   01/11/23 108.9 kg (240 lb)   12/27/22 114.6 kg (252 lb 9.6 oz)   12/20/22 111.1 kg (245 lb)      Care Everywhere:   105.7 kg (233 lb) 10/08/2024   104.8 kg (231 lb) 07/24/2024     LABS  Labs reviewed  Noted: K, Na, Phos, Mag WNL. High BG(141), high trop(33). Low bibi(8.3). Vit D is WNL - though on the lower end of normal.    MEDICATIONS  Medications reviewed    ASSESSED NUTRITION NEEDS PER APPROVED PRACTICE GUIDELINES:    Dosing Weight 104.1 kg (Actual BW)  Estimated Energy Needs: 15-20 Kcal/Kg  Justification: maintenance  Estimated Protein Needs: 1-1.2 g pro/Kg  Justification: maintenance and preservation of lean body mass w/ advancing age  Estimated Fluid Needs: " per provider pending fluid status      NUTRITION DIAGNOSIS:  Predicted inadequate nutrient intake (energy/protein) related to potential for decline in PO intakes pending appetite, LOS.       NUTRITION INTERVENTIONS  Recommendations / Nutrition Prescription  See Above      Implementation  Nutrition education: Provided education on See Above - of note, pt did seem very overwhelmed by receiving education so may be best to try this again with family in the room or just provide educational material that he can digest at his own pace and then reach out with questions.       Nutrition Goals  >75% PO of meals TID (on average)        MONITORING AND EVALUATION:  Progress towards goals will be monitored and evaluated per protocol and Practice Guidelines          Juliana Del Toro RD, LD  Clinical Dietitian  3rd Floor/ICU: [phone]  All Other Floors: [phone]  Weekends/Holiday: [phone]  Office: [phone]

## 2024-10-22 NOTE — PLAN OF CARE
"[id number] RN  IS- 2000. Fine crackles bases.   Mild edema ankles/feet.   Frequent falls- pain control. Up in chair watching TV.  Sutures to right hand covered. Skin tear to left and right arms covered.   Patient vital signs are at baseline: Yes. Room air  Patient able to ambulate as they were prior to admission or with assist devices provided by therapies during their stay:  Yes GB, 1 assist. Fall risk. Alarms on and within arms reach.  Patient MUST void prior to discharge:  Yes  Patient able to tolerate oral intake:  Yes  Pain has adequate pain control using Oral analgesics:  Yes  Does patient have an identified :  Yes. Wife.   Has goal D/C date and time been discussed with patient:  No,  Reason:  when medically ready.   Pleasant. Forgetful.    Problem: Adult Inpatient Plan of Care  Goal: Plan of Care Review  Description: The Plan of Care Review/Shift note should be completed every shift.  The Outcome Evaluation is a brief statement about your assessment that the patient is improving, declining, or no change.  This information will be displayed automatically on your shift  note.  Outcome: Progressing  Flowsheets (Taken 10/22/2024 1700)  Plan of Care Reviewed With: patient  Overall Patient Progress: improving  Goal: Patient-Specific Goal (Individualized)  Description: You can add care plan individualizations to a care plan. Examples of Individualization might be:  \"Parent requests to be called daily at 9am for status\", \"I have a hard time hearing out of my right ear\", or \"Do not touch me to wake me up as it startles  me\".  Outcome: Progressing  Goal: Absence of Hospital-Acquired Illness or Injury  Outcome: Progressing  Intervention: Identify and Manage Fall Risk  Recent Flowsheet Documentation  Taken 10/22/2024 1653 by Delia Gonzáles, RN  Safety Promotion/Fall Prevention:   activity supervised   assistive device/personal items within reach   clutter free environment maintained   increased rounding and " observation   increase visualization of patient   lighting adjusted   mobility aid in reach   nonskid shoes/slippers when out of bed   patient and family education   room near nurse's station   room organization consistent   safety round/check completed   supervised activity   treat reversible contributory factors   treat underlying cause  Intervention: Prevent Skin Injury  Recent Flowsheet Documentation  Taken 10/22/2024 1653 by Delia Gonzáles, RN  Body Position:   position changed independently   supine, head elevated  Intervention: Prevent Infection  Recent Flowsheet Documentation  Taken 10/22/2024 1653 by Delia Gonzáles, RN  Infection Prevention:   cohorting utilized   rest/sleep promoted   single patient room provided  Goal: Optimal Comfort and Wellbeing  Outcome: Progressing  Intervention: Monitor Pain and Promote Comfort  Recent Flowsheet Documentation  Taken 10/22/2024 1643 by Delia Gonzáles, RN  Pain Management Interventions: medication (see MAR)  Goal: Readiness for Transition of Care  Outcome: Progressing     Problem: Pain Acute  Goal: Optimal Pain Control and Function  Outcome: Progressing  Intervention: Develop Pain Management Plan  Recent Flowsheet Documentation  Taken 10/22/2024 1643 by Delia Gonzáles RN  Pain Management Interventions: medication (see MAR)  Intervention: Prevent or Manage Pain  Recent Flowsheet Documentation  Taken 10/22/2024 1653 by Delia Gonzáles, RN  Medication Review/Management: medications reviewed     Problem: Skin Injury Risk Increased  Goal: Skin Health and Integrity  Outcome: Progressing  Intervention: Optimize Skin Protection  Recent Flowsheet Documentation  Taken 10/22/2024 1653 by Delia Gonzáles, RN  Activity Management: activity encouraged     Problem: Hip Fracture Medical Management  Goal: Optimal Coping with Change in Health Status  Outcome: Progressing  Goal: Absence of Bleeding  Outcome: Progressing  Goal: Effective  Bowel Elimination  Outcome: Progressing  Goal: Baseline Cognitive Function Maintained  Outcome: Progressing  Goal: Absence of Embolism  Outcome: Progressing  Goal: Fracture Stability  Outcome: Progressing  Goal: Optimal Functional Performance  Outcome: Progressing  Intervention: Promote Optimal Functional Status  Recent Flowsheet Documentation  Taken 10/22/2024 1653 by Delia Gonzáles, RN  Activity Management: activity encouraged  Goal: Pain Control and Function  Outcome: Progressing  Intervention: Manage Acute Orthopaedic-Related Pain  Recent Flowsheet Documentation  Taken 10/22/2024 1643 by Delia Gonzáles, RN  Pain Management Interventions: medication (see MAR)  Goal: Effective Urinary Elimination  Outcome: Progressing   Goal Outcome Evaluation:      Plan of Care Reviewed With: patient    Overall Patient Progress: improvingOverall Patient Progress: improving

## 2024-10-22 NOTE — PHARMACY-ADMISSION MEDICATION HISTORY
Pharmacist Admission Medication History    Admission medication history is complete. The information provided in this note is only as accurate as the sources available at the time of the update.    Information Source(s): Patient, Family member, and CareEverywhere/SureScripts via in-person    Pertinent Information: None    Changes made to PTA medication list:  Added: biotin, multivitamin  Deleted: None  Changed: omeprazole, ibuprofen    Allergies reviewed with patient and updates made in EHR: yes    Medication History Completed By: Cici Norman [facility] 10/22/2024 10:54 AM    PTA Med List   Medication Sig Last Dose    Ascorbic Acid (VITAMIN C) 500 MG CHEW Take 1 tablet by mouth daily. 10/20/2024    atorvastatin (LIPITOR) 10 MG tablet Take 10 mg by mouth at bedtime. 10/20/2024 at pm    BIOTIN PO Take 1 tablet by mouth daily. 10/20/2024    Cholecalciferol (VITAMIN D3 PO) Take 1 capsule by mouth daily. 10/20/2024    ibuprofen (ADVIL/MOTRIN) 200 MG tablet Take 600 mg by mouth 2 times daily. 10/21/2024 at -    magnesium oxide (MAG-OX) 400 MG tablet Take 1 tablet by mouth daily. 10/20/2024 at -    Multiple Vitamins-Minerals (MULTIVITAMIN MEN 50+ PO) Take 1 tablet by mouth daily. 10/21/2024    omeprazole (PRILOSEC) 40 MG DR capsule Take 40 mg by mouth daily. 10/21/2024 at -    polyethylene glycol (MIRALAX) 17 GM/Dose powder Take 1 Capful by mouth daily. 10/21/2024 at -

## 2024-10-22 NOTE — CONSULTS
THORACIC SURGERY CONSULT NOTE    Consult Reason: Traumatic rib fractures left side 3-8    HPI: Guanako Padilla is a 69 year old male with history including cognitive impairment with ataxia and dizziness, HLD, gastric ulcer, BPH, SHREYA, and obesity who presented to the ED after a fall at home.  He was walking down the stairs on his deck when he became dizzy, slipped and fell backwards down 7-8 stairs and landing on his back and left side. He was reporting left lateral chest pain as well as lacerations to the hand, arm, and elbow.  He did report hitting his head, but did not have loss conscious. This is not his first event with falls or dizziness.  His daughter reported that he had a bad fall about 2 years ago and since then he said some worsening cognitive impairment and persistent dizziness and balance difficulties for which they are following with neurology outpatient. Pain improved with medications, but worse with deep breaths and movement, he denied any shortness of breath. CT chest/ab/pelvis showed mildly displaced lateral left 3rd-8th rib fractures, acute L2 transverse process fracture. Thoracic Surgery was consulted for evaluation of left sided acute traumatic rib fractures.       A/P: Patient is a 69 year old male with acute traumatic left sided rib 3-8 fractures after becoming dizzy and falling down stairs on his deck at home. Thoracic surgery consulted for rib fracture evaluation.     -No surgical intervention warranted at this time, recommend conservative management of rib fractures.  -Pain appears well controlled on current multimodal regimen. Seen ambulating the wood working with PT. Reports only minimal pain with deep inspirations.  -Satting well on room air, no SOB  -Long discussion had with patient and spouse, all questions addressed. They are planning to move to a more suitable living environment given Galen's more frequents falls.   -Remainder of care per primary service    Patient and plan discussed  "with staff surgeon Dr. Aixa Blackman    Thank you for the opportunity to participate in the care of this patient.    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 Covarrubias [facility]    COLONOSCOPY  01/08/2016    Procedure: COLONOSCOPY;  Surgeon: Juan Covarrubias MD;  Location:  GI    ESOPHAGOSCOPY, GASTROSCOPY, DUODENOSCOPY (EGD), COMBINED  01/08/2016    Dr. Covarrubias [facility]    ESOPHAGOSCOPY, GASTROSCOPY, DUODENOSCOPY (EGD), COMBINED N/A 01/08/2016    Procedure: COMBINED ESOPHAGOSCOPY, GASTROSCOPY, DUODENOSCOPY (EGD), BIOPSY SINGLE OR MULTIPLE;  Surgeon: Juan Covarrubias MD;  Location:  GI    ORTHOPEDIC SURGERY Left 1976    left shoulder, rotator cuff repair    ORTHOPEDIC SURGERY Right 1980    wrist tendon repair    ORTHOPEDIC SURGERY Right 2011    right foot fx-metal in foot-ORIF    ORTHOPEDIC SURGERY Right 1981    Right elbow, dx \"little league elbow\", it was cleaned out    ORTHOPEDIC SURGERY Right 2008    Right shoulder, rotator cuff repair       FH:  family history includes Alzheimer Disease in his mother; Anxiety Disorder in his mother; Cancer in his maternal grandfather; Cerebrovascular Disease in his father; Depression in his sister; Diabetes in his brother and father; Hyperlipidemia in his father; Mental Illness in his mother; Osteoporosis in his mother.      SH:  Social History     Tobacco Use    Smoking status: Former     Current packs/day: 0.00     Types: Cigarettes     Start date: 1/1/2015     Quit date: 1/1/2022     Years since " quittin.8    Smokeless tobacco: Never   Vaping Use    Vaping status: Never Used   Substance Use Topics    Alcohol use: Yes     Comment: 1 1/2 beers per day    Drug use: No        Allergies:  No Known Allergies    Home Meds:  Medications Prior to Admission   Medication Sig Dispense Refill Last Dose    Ascorbic Acid (VITAMIN C) 500 MG CHEW Take 1 tablet by mouth daily.   10/20/2024    atorvastatin (LIPITOR) 10 MG tablet Take 10 mg by mouth at bedtime.   10/20/2024 at pm    BIOTIN PO Take 1 tablet by mouth daily.   10/20/2024    Cholecalciferol (VITAMIN D3 PO) Take 1 capsule by mouth daily.   10/20/2024    ibuprofen (ADVIL/MOTRIN) 200 MG tablet Take 600 mg by mouth 2 times daily.   10/21/2024 at -    magnesium oxide (MAG-OX) 400 MG tablet Take 1 tablet by mouth daily.   10/20/2024 at -    Multiple Vitamins-Minerals (MULTIVITAMIN MEN 50+ PO) Take 1 tablet by mouth daily.   10/21/2024    omeprazole (PRILOSEC) 40 MG DR capsule Take 40 mg by mouth daily.   10/21/2024 at -    polyethylene glycol (MIRALAX) 17 GM/Dose powder Take 1 Capful by mouth daily.   10/21/2024 at -       ROS: Remainder of pertinent 12pt ROS negative.    Physical Exam:  Temp:  [97.6  F (36.4  C)-98.3  F (36.8  C)] 97.8  F (36.6  C)  Pulse:  [68-95] 81  Resp:  [18-24] 24  BP: (118-149)/() 126/72  SpO2:  [91 %-97 %] 97 %    Gen: NAD, seen ambulating with PT and sitting in chair  Lungs: CTAB, non-labored breathing on RA  CV: regular rhythm, normal rate  Abd: Non distended   Ext: Appear well perfused, right hand bandaged  Neuro: AOx3, seemed to take frequent ques from his spouse during conversation.     Labs:  ABG No lab results found in last 7 days.  CBC  Recent Labs   Lab 10/22/24  0546 10/21/24  2004   WBC 7.8 5.7   HGB 11.5* 12.1*    245     BMP  Recent Labs   Lab 10/22/24  0546 10/21/24  2004    145   POTASSIUM 4.5 4.2   CHLORIDE 109* 109*   CO2 22 19*   BUN 26.3* 24.7*   CR 0.92 1.01   * 157*     LFTNo lab results found  in last 7 days.  PancreasNo lab results found in last 7 days.    Imaging:  Reviewed

## 2024-10-22 NOTE — PROGRESS NOTES
10/22/24 3934   Appointment Info   Signing Clinician's Name / Credentials (PT) Radhames Byrd DPT   Living Environment   Living Environment Comments Pt lives in multilevel home per spouse with >14 stairs to navigate. Pt has been ind with no AD use. Just had initial evaluation with [facility], due to see audiologist soon.   Self-Care   Usual Activity Tolerance good   Current Activity Tolerance moderate   Equipment Currently Used at Home shower chair;walker, rolling;raised toilet seat   Fall history within last six months yes   Number of times patient has fallen within last six months 30  (in past year)   General Information   Onset of Illness/Injury or Date of Surgery 10/21/24   Patient/Family Therapy Goals Statement (PT) to improve balance, safety   Pertinent History of Current Problem (include personal factors and/or comorbidities that impact the POC) Per H&P, pt is a is a 69 year old male with PMH including cognitive impairment with ataxia and dizziness, HLD, gastric ulcer, BPH, SHREYA, and obesity who presents after a fall at home.   Existing Precautions/Restrictions fall   General Observations Spouse answering most of recent environment questions; reports some cognitive decline recently; had been seeing OP PT for balance difficulties   Cognition   Affect/Mental Status (Cognition) WFL   Orientation Status (Cognition) oriented to;person;place;situation   Follows Commands (Cognition) follows one-step commands;75-90% accuracy   Pain Assessment   Patient Currently in Pain Yes, see Vital Sign flowsheet  (L rib pain)   Integumentary/Edema   Integumentary/Edema Comments multiple dressing on UEs   Range of Motion (ROM)   ROM Comment decreased B UE shoulder ROM, able to achieve at least 90 flex B UE;  B LE WFL   Strength (Manual Muscle Testing)   Strength (Manual Muscle Testing) Deficits observed during functional mobility   Strength Comments able to complete B SLR   Bed Mobility   Comment, (Bed  Mobility) not assess at eval   Transfers   Comment, (Transfers) CGA sit <> Stand w/o AD   Gait/Stairs (Locomotion)   Distance in Feet (Gait) 20   Pattern (Gait) step-through   Deviations/Abnormal Patterns (Gait) base of support, wide;gait speed decreased   Comment, (Gait/Stairs) CGA without AD   Balance   Balance Comments good sitting; fair+ standing w/o AD   Sensory Examination   Sensory Perception Comments no deficits identified with light touch, proprioceptioin   Coordination   Coordination Comments slight incoordination with ambulation   Clinical Impression   Criteria for Skilled Therapeutic Intervention Yes, treatment indicated   PT Diagnosis (PT) impaired functional mobility   Influenced by the following impairments decreased balance, coordination, LE strength   Functional limitations due to impairments impaired transfers, bed mobility, ambulation, stairs   Clinical Presentation (PT Evaluation Complexity) stable   Clinical Presentation Rationale Pt functional status   Clinical Decision Making (Complexity) low complexity   Planned Therapy Interventions (PT) balance training;bed mobility training;gait training;home exercise program;neuromuscular re-education;patient/family education;stair training;strengthening;transfer training;progressive activity/exercise   Risk & Benefits of therapy have been explained evaluation/treatment results reviewed;care plan/treatment goals reviewed;risks/benefits reviewed;current/potential barriers reviewed;participants voiced agreement with care plan;participants included;patient;spouse/significant other   PT Total Evaluation Time   PT Eval, Low Complexity Minutes (54004) 12   Physical Therapy Goals   PT Frequency Daily   PT Predicted Duration/Target Date for Goal Attainment 10/25/24   PT Goals Bed Mobility;Gait;Transfers;Stairs   PT: Bed Mobility Modified independent;Supine to/from sit   PT: Transfers Modified independent;Sit to/from stand;Assistive device   PT: Gait Modified  independent;Assistive device;150 feet   PT: Stairs Supervision/stand-by assist;Rail on right;Assistive device;Greater than 10 stairs   PT Discharge Planning   PT Plan assess bed mobility, progress ind with ambulation using AD (SEC vs 4ww?); trial stairs   PT Discharge Recommendation (DC Rec) home with assist   PT Rationale for DC Rec Pt appears to be close to recent baseline mobility. Pt would benefit from assistive device use based on evaluation and significant amt of recent falls. Spouse reports starting process to transition to single level living/supported environment.   PT Brief overview of current status Ax1   Total Session Time   Total Session Time (sum of timed and untimed services) 12

## 2024-10-22 NOTE — CONSULTS
[facility]   Trauma Surgical Consultation           Assessment and Plan:   Assessment:   Guanako Padilla is a 69 year old male who presents after a fall down his deck staris.    Acute traumatic injuries by imaging: left rib fractures (non-displaced) 3-8, L2 transverse process fracture.    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        Plan:   Admit to hospitalist service.  Pain control.    Consults: Thoracic, spine.              Chief Complaint:   Left chest pain following fall down stairs.     History is obtained from the patient and his spouse.         History of Present Illness:   Guanako Padilla is a 69 year old male who presented to the ED after falling down his deck stairs while using a leaf blower. He landed on his left side and had his right hand out and cut it on some rocks. His hand was sutured in the ED. He evidently felt dizzy just prior to his fall and has had similar episodes in the past (in midst of a workup for ataxia). He initially had left chest pain but states this is better after IV narcotics. He did strike his head as well.      Negative loss of consciousnes.  EtOH use immediately prior to incident:  No  Illicit drug use immediately prior to incident:  No  Anticoagulation:  No     History of syncope:  No   History of falls:  Yes   At baseline ambulates independently.    Complains of left chest pain.      Denies shortness of breath,  abdominal pain, nausea, emesis, dizziness, visual changes, headache, neck pain,extremity pain.               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          Past Surgical History:  "    Past Surgical History:   Procedure Laterality Date    COLONOSCOPY  2016    Dr. Covarrubias [facility]    COLONOSCOPY  2016    Procedure: COLONOSCOPY;  Surgeon: Juan Covarrubias MD;  Location:  GI    ESOPHAGOSCOPY, GASTROSCOPY, DUODENOSCOPY (EGD), COMBINED  2016    Dr. Covarrubias [facility]    ESOPHAGOSCOPY, GASTROSCOPY, DUODENOSCOPY (EGD), COMBINED N/A 2016    Procedure: COMBINED ESOPHAGOSCOPY, GASTROSCOPY, DUODENOSCOPY (EGD), BIOPSY SINGLE OR MULTIPLE;  Surgeon: Juan Covarrubias MD;  Location:  GI    ORTHOPEDIC SURGERY Left     left shoulder, rotator cuff repair    ORTHOPEDIC SURGERY Right     wrist tendon repair    ORTHOPEDIC SURGERY Right     right foot fx-metal in foot-ORIF    ORTHOPEDIC SURGERY Right     Right elbow, dx \"little league elbow\", it was cleaned out    ORTHOPEDIC SURGERY Right     Right shoulder, rotator cuff repair            Social History:     Social History     Tobacco Use    Smoking status: Former     Current packs/day: 0.00     Types: Cigarettes     Start date: 2015     Quit date: 2022     Years since quittin.8    Smokeless tobacco: Never   Substance Use Topics    Alcohol use: Yes     Comment: 1 1/2 beers per day             Family History:   Family history reviewed and not pertinent.         Allergies:   No Known Allergies          Medications:     Current Facility-Administered Medications   Medication Dose Route Frequency Provider Last Rate Last Admin    acetaminophen (TYLENOL) tablet 975 mg  975 mg Oral Q8H Lui Peña MD   975 mg at 10/22/24 0806    atorvastatin (LIPITOR) tablet 10 mg  10 mg Oral At Bedtime Lui Peña MD        calcium carbonate (TUMS) chewable tablet 1,000 mg  1,000 mg Oral 4x Daily PRN Lui Peña MD        gabapentin (NEURONTIN) capsule 100 mg  100 mg Oral TID Lui Peña MD   100 mg at 10/22/24 0806    HYDROmorphone (DILAUDID) injection 0.2 mg  0.2 mg Intravenous Q2H PRN " Lui Peña MD        Or    HYDROmorphone (DILAUDID) injection 0.4 mg  0.4 mg Intravenous Q2H PRN Lui Peña MD   0.4 mg at 10/22/24 0908    ibuprofen (ADVIL/MOTRIN) tablet 400 mg  400 mg Oral Q6H PRN Lui Peña MD        Lidocaine (LIDOCARE) 4 % Patch 1 patch  1 patch Transdermal Q24H Lui Peña MD   1 patch at 10/22/24 0907    lidocaine (LMX4) cream   Topical Q1H PRN Lui Peña MD        lidocaine (LMX4) cream   Topical Q1H PRN Lui Peña MD        lidocaine 1 % 0.1-1 mL  0.1-1 mL Other Q1H PRN Lui Peña MD        lidocaine 1 % 0.1-1 mL  0.1-1 mL Other Q1H PRN Lui Peña MD        melatonin tablet 1 mg  1 mg Oral At Bedtime PRN Lui Peña MD        methocarbamol (ROBAXIN) tablet 500 mg  500 mg Oral TID Lui Peña MD   500 mg at 10/22/24 0806    ondansetron (ZOFRAN ODT) ODT tab 4 mg  4 mg Oral Q6H PRN Lui Peña MD        Or    ondansetron (ZOFRAN) injection 4 mg  4 mg Intravenous Q6H PRN Lui Peña MD        oxyCODONE (ROXICODONE) tablet 5 mg  5 mg Oral Q4H PRN Lui Peña MD   5 mg at 10/22/24 0120    Or    oxyCODONE IR (ROXICODONE) tablet 10 mg  10 mg Oral Q4H PRN Lui Peña MD        pantoprazole (PROTONIX) EC tablet 40 mg  40 mg Oral QAM AC Lui Peña MD   40 mg at 10/22/24 0543    polyethylene glycol (MIRALAX) Packet 17 g  17 g Oral BID PRN Lui Peña MD        prochlorperazine (COMPAZINE) injection 5 mg  5 mg Intravenous Q6H PRN Lui Peña MD        Or    prochlorperazine (COMPAZINE) tablet 5 mg  5 mg Oral Q6H PRN Lui Peña MD        Or    prochlorperazine (COMPAZINE) suppository 12.5 mg  12.5 mg Rectal Q12H PRN Lui Peña MD        senna-docusate (SENOKOT-S/PERICOLACE) 8.6-50 MG per tablet 1 tablet  1 tablet Oral BID PRN Lui Peña MD        Or    wiltonte  "(SENOKOT-S/PERICOLACE) 8.6-50 MG per tablet 2 tablet  2 tablet Oral BID PRN Lui Peña MD        sodium chloride (PF) 0.9% PF flush 3 mL  3 mL Intracatheter Q8H Lui Peña MD        sodium chloride (PF) 0.9% PF flush 3 mL  3 mL Intracatheter q1 min prn Lui Peña MD        sodium chloride (PF) 0.9% PF flush 3 mL  3 mL Intracatheter Q8H Valentin Ortiz MD        sodium chloride (PF) 0.9% PF flush 3 mL  3 mL Intracatheter q1 min prn Lui Peña MD         Current Facility-Administered Medications   Medication Dose Route Frequency Provider Last Rate Last Admin    acetaminophen (TYLENOL) tablet 975 mg  975 mg Oral Q8H Lui Peña MD   975 mg at 10/22/24 0806    atorvastatin (LIPITOR) tablet 10 mg  10 mg Oral At Bedtime Lui Peña MD        gabapentin (NEURONTIN) capsule 100 mg  100 mg Oral TID Lui Peña MD   100 mg at 10/22/24 0806    Lidocaine (LIDOCARE) 4 % Patch 1 patch  1 patch Transdermal Q24H Lui Peña MD   1 patch at 10/22/24 0907    methocarbamol (ROBAXIN) tablet 500 mg  500 mg Oral TID Lui Peña MD   500 mg at 10/22/24 0806    pantoprazole (PROTONIX) EC tablet 40 mg  40 mg Oral QAM AC Lui Peña MD   40 mg at 10/22/24 0543    sodium chloride (PF) 0.9% PF flush 3 mL  3 mL Intracatheter Q8H Lui Peña MD        sodium chloride (PF) 0.9% PF flush 3 mL  3 mL Intracatheter Q8H Valentin Ortiz MD                Review of Systems:   The 10 point review of systems is negative other than noted in the HPI.           Physical Exam:   /70 (BP Location: Right arm)   Pulse 68   Temp 97.6  F (36.4  C) (Oral)   Resp 20   Ht 1.88 m (6' 2\")   Wt 104.1 kg (229 lb 6.4 oz)   SpO2 95%   BMI 29.45 kg/m    General appearance: well-nourished, no apparent distress  HEENT: Pupils are equal and round.  Head is normocephalic and atraumatic.  Neck is without thyromegaly, masses, " swelling, ecchymosis.  Chest:  Clear to auscultation bilaterally.  Heart with regular rate and rhythm, no murmurs.  No palpable swelling, masses, ecchymosis, no crepitus, no visible deformity. He is tender to palpation anteriorly on the left chest; there is a lidocaine patch in place here as well.  Abdomen:  Nondistended, soft, nontender to palpation.  No masses.  Back: no palpable masses or visible deformity.  TTP in low back.  Extremities: Strength normal and symmetric. Abrasions over his left lateral arm and right hand is bandaged.  Neurologic: nonfocal, grossly intact times four extremities, alert and oriented times three.  Cranial nerves II through XII intact grossly.  Psychiatric: mood and affect are appropriate.  Skin: without jaundice, lesions, rashes.  Abrasions as noted above.           Data:   WBC -   WBC   Date Value Ref Range Status   03/06/2020 5.4 4.0 - 11.0 10e9/L Final     WBC Count   Date Value Ref Range Status   10/22/2024 7.8 4.0 - 11.0 10e3/uL Final   ], HgB -   Hemoglobin   Date Value Ref Range Status   10/22/2024 11.5 (L) 13.3 - 17.7 g/dL Final   03/06/2020 13.3 13.3 - 17.7 g/dL Final   ]   Liver Function Studies -   Recent Labs   Lab Test 03/08/23  1251   PROTTOTAL 7.2   ALBUMIN 4.1   BILITOTAL 0.9   ALKPHOS 75   AST 26   ALT 17         IMAGING:  Results for orders placed or performed during the hospital encounter of 10/21/24   Head CT w/o contrast    Narrative    EXAM: CT HEAD W/O CONTRAST  LOCATION: [facility]  DATE: 10/21/2024    INDICATION: head injury after fall  COMPARISON: None.  TECHNIQUE: Routine CT Head without IV contrast. Multiplanar reformats. Dose reduction techniques were used.    FINDINGS:  INTRACRANIAL CONTENTS: No evidence of acute intracranial hemorrhage or mass effect. Scattered foci of decreased attenuation within the cerebral hemispheric white matter which are nonspecific, though most commonly ascribed to chronic small vessel ischemic   disease.  The ventricles and sulci are prominent consistent with mild brain parenchymal volume loss. Gray-white matter differentiation is maintained. The basilar cisterns are patent.    VISUALIZED ORBITS/SINUSES/MASTOIDS: The globes are unremarkable. The partially imaged paranasal sinuses, mastoid air cells and middle ear cavities are unremarkable.     BONES/SOFT TISSUES: The visualized skull base and calvarium are unremarkable.      Impression    IMPRESSION:    1.  No evidence of acute intracranial hemorrhage or mass effect.   XR Hand Right G/E 3 Views    Narrative    EXAM: XR HAND RIGHT G/E 3 VIEWS  LOCATION: [facility]  DATE: 10/21/2024    INDICATION: fall, pain  COMPARISON: 7/4/2011.      Impression    IMPRESSION: No acute fracture or dislocation. 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 Relatively mild osteoarthritis of the triscaphe   joint and a few other interphalangeal joints of the fingers has also worsened.   CT Chest/Abdomen/Pelvis w Contrast    Narrative    EXAM: CT CHEST/ABDOMEN/PELVIS W CONTRAST  LOCATION: [facility]  DATE: 10/21/2024    INDICATION: Left-sided chest pain after fall downstairs.  COMPARISON: CT chest 8/6/2024.  TECHNIQUE: CT scan of the chest, abdomen, and pelvis was performed following injection of IV contrast. Multiplanar reformats were obtained. Dose reduction techniques were used.   CONTRAST: 100mL Isovue 370    FINDINGS: Patient was imaged with arm(s) at side, limiting study quality.    LUNGS AND PLEURA: Trachea and large airways are patent. No focal airspace consolidation. Mild dependent atelectatic change.     No suspicious pulmonary nodule.    No pneumothorax.    Trace left pleural fluid.     MEDIASTINUM/AXILLAE: Unchanged mild mediastinal lymphadenopathy. No appreciable hilar lymphadenopathy.     Thoracic esophagus is  unremarkable.    No axillary lymphadenopathy.    Mild bilateral gynecomastia.    Ascending thoracic aortic aneurysm, measuring 4.2 cm, unchanged. Mild atheromatous disease.    Mild cardiomegaly. No pericardial effusion.    CORONARY ARTERY CALCIFICATION: Mild.    HEPATOBILIARY: Mild hepatic steatosis.    Gallbladder is normal.    No intrahepatic or intrahepatic biliary ductal dilatation.    PANCREAS: Enhances normally. No peripancreatic inflammatory fat stranding.    SPLEEN: Enhances normally. Normal size.    ADRENAL GLANDS: Normal.    KIDNEYS: Both kidneys enhance symmetrically, without hydronephrosis. Benign appearing renal cyst(s), in addition to other low attenuation renal lesion(s), which are too small to characterize, though statistically likely to represent simple cysts; no   further follow up recommended. Additional small parapelvic cysts at the inferior pole left kidney.    Tiny nonobstructing stones of the inferior pole left kidney.    Urinary bladder is unremarkable.    PELVIC ORGANS: No suspicious abnormality.    BOWEL: No evidence of acute gastrointestinal inflammation or obstruction. Normal appendix.    No intraperitoneal free fluid or free air. Small bilateral fat-containing inguinal hernias.    LYMPH NODES: No suspicious abdominal or pelvic lymphadenopathy.    VASCULATURE: No abdominal aortic aneurysm. Mild atheromatous disease.    MUSCULOSKELETAL: Acute, nondisplaced to mildly displaced fractures of the lateral left third through eighth ribs. Multiple old bilateral anterolateral rib fracture deformities. Old fracture deformity of the posterolateral right 12th rib. Acute,   nondisplaced fracture of the left L2 transverse process. Chronic, mild superior endplate height loss at the T3 vertebral body. Elastofibroma dorsi. Left glenohumeral joint effusion.    OTHER: No additionally significant abnormalities.      Impression    IMPRESSION:   1.  Acute, nondisplaced to mildly displaced fractures of the  lateral left third through eighth ribs.  2.  Acute, nondisplaced fracture of the left L2 transverse process.  3.  Trace left pleural fluid. No pneumothorax.  4.  Unchanged ascending thoracic aortic aneurysm, measuring 4.2 cm.  5.  Mild hepatic steatosis.  6.  Nonobstructing left nephrolithiasis.       Rakseh Lunsford MD

## 2024-10-22 NOTE — PROGRESS NOTES
10/22/24 0206   Skin   Skin WDL X;color;integrity   Skin Color without discoloration   Skin Integrity abrasion/excoriation;bruised (ecchymotic);scab   Abrasion / Excoriation Right;Wrist;Hand Finger   Bruised (ecchymotic) location Right;Back;Other (Comment)  (flank)   Scab Right;Shin   Device Skin Interventions Taken No adjustments needed     Admission/Transfer from: ED  2 RN skin assessment completed. Yes  Significant findings include: Bruising on (R) back/flank, Scabs on (R) leg  LDA added (if applicable)? NO  Requested WOC Nurse Consult from MD? NO

## 2024-10-22 NOTE — CONSULTS
NEUROSURGERY CONSULT  [facility]   [facility]         PLAN:    Mr. Padilla's most recent imaging exhibits an acute, nondisplaced fracture of the left L2 transverse process. This fracture does not require any type of Neurosurgical intervention to include surgery, bracing or even follow-up.     We feel that it would be in his best interest to proceed with a conservative approach by pain management set forth by the Medical team here at [facility]. We do suggest that he not lift anything greater than 5-10 pounds and avoid excessive bending, twisting, and turning until his pain has resolved.     We did review the images together and we explained his condition and the recommended treatment.     We will sign off. Please page our on-call service for any questions or concerns.     It has been a pleasure meeting Guanako Padilla. Thank you for having us be involved in his care.    ______________________________________________________________________    HPI:    Guanako Padilla is a pleasant 69 year old male who presented to the [facility] Emergency Department for consultation after a fall. Apparently he was doing some yard work when he unfortunately fell off of his deck resulting in rib fractures and a L2 transverse process fracture. He did feel dizzy prior to this fall. He and his wife do not feel that his L2 TPF occurred during this fall, rather a week ago when he fell into the toilet. He really is not experiencing any pain in his lumbar spine at the time of my evaluation, just rib pain.     There are no bowel or bladder changes. No other concerns are voiced.    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 Covarrubias [facility]    COLONOSCOPY  2016    Procedure: COLONOSCOPY;  Surgeon: Juan Covarrubias MD;  Location:  GI    ESOPHAGOSCOPY, GASTROSCOPY, DUODENOSCOPY (EGD), COMBINED  2016    Dr. Covarrubias [facility]    ESOPHAGOSCOPY, GASTROSCOPY, DUODENOSCOPY (EGD), COMBINED N/A 2016    Procedure: COMBINED ESOPHAGOSCOPY, GASTROSCOPY, DUODENOSCOPY (EGD), BIOPSY SINGLE OR MULTIPLE;  Surgeon: Juan Covarrubias MD;  Location:  GI    ORTHOPEDIC SURGERY Left     left shoulder, rotator cuff repair    ORTHOPEDIC SURGERY Right     wrist tendon repair    ORTHOPEDIC SURGERY Right     right foot fx-metal in foot-ORIF    ORTHOPEDIC SURGERY Right     Right elbow, dx \"little league elbow\", it was cleaned out    ORTHOPEDIC SURGERY Right     Right shoulder, rotator cuff repair       No Known Allergies    Social History     Tobacco Use    Smoking status: Former     Current packs/day: 0.00     Types: Cigarettes     Start date: 2015     Quit date: 2022     Years since quittin.8    Smokeless tobacco: Never   Substance Use Topics    Alcohol use: Yes     Comment: 1 1/2 beers per day       Family History   Problem Relation Age of Onset    Alzheimer Disease Mother         Dementia    Anxiety Disorder Mother     Mental Illness Mother         Alzheimer's    Osteoporosis Mother     Cerebrovascular Disease Father     Diabetes Father     Hyperlipidemia Father     Cancer Maternal Grandfather     Diabetes Brother     Depression Sister     Colon Cancer No family hx of        Scheduled Medications    Current Facility-Administered Medications   Medication Dose Route Frequency Provider Last Rate Last Admin    acetaminophen (TYLENOL) tablet 975 mg  975 mg Oral Q8H Lui Peña MD   975 mg at 10/22/24 0806    atorvastatin (LIPITOR) tablet 10 mg  10 mg Oral At Bedtime Lui Peña MD        gabapentin (NEURONTIN) capsule 100 mg  100 mg Oral TID Casey, " Lui Chavez MD   100 mg at 10/22/24 0806    Lidocaine (LIDOCARE) 4 % Patch 1 patch  1 patch Transdermal Q24H Lui Peña MD   1 patch at 10/22/24 0907    methocarbamol (ROBAXIN) tablet 500 mg  500 mg Oral TID Lui Peña MD   500 mg at 10/22/24 0806    pantoprazole (PROTONIX) EC tablet 40 mg  40 mg Oral QAM AC Lui Peña MD   40 mg at 10/22/24 0543    sodium chloride (PF) 0.9% PF flush 3 mL  3 mL Intracatheter Q8H Lui Peña MD        sodium chloride (PF) 0.9% PF flush 3 mL  3 mL Intracatheter Q8H Valentin Ortiz MD           Home Medications    Lipitor  Colace  Crestor  Vit C  Vit D3  Colcrys  Norco  Prilosec  Miralax  Deltasone  Ultram    PRN Medications    Current Facility-Administered Medications   Medication Dose Route Frequency Provider Last Rate Last Admin    calcium carbonate (TUMS) chewable tablet 1,000 mg  1,000 mg Oral 4x Daily PRN Lui Peña MD        HYDROmorphone (DILAUDID) injection 0.2 mg  0.2 mg Intravenous Q2H PRN Lui Peña MD        Or    HYDROmorphone (DILAUDID) injection 0.4 mg  0.4 mg Intravenous Q2H PRN Lui Peña MD   0.4 mg at 10/22/24 0908    ibuprofen (ADVIL/MOTRIN) tablet 400 mg  400 mg Oral Q6H PRN Lui Peña MD   400 mg at 10/22/24 1027    lidocaine (LMX4) cream   Topical Q1H PRN Lui Peña MD        lidocaine (LMX4) cream   Topical Q1H PRN Lui Peña MD        lidocaine 1 % 0.1-1 mL  0.1-1 mL Other Q1H PRN Lui Peña MD        lidocaine 1 % 0.1-1 mL  0.1-1 mL Other Q1H PRN Lui Peña MD        melatonin tablet 1 mg  1 mg Oral At Bedtime PRN Lui Peña MD        ondansetron (ZOFRAN ODT) ODT tab 4 mg  4 mg Oral Q6H PRN Lui Peña MD        Or    ondansetron (ZOFRAN) injection 4 mg  4 mg Intravenous Q6H PRN Lui Peña MD        oxyCODONE (ROXICODONE) tablet 5 mg  5 mg Oral Q4H PRN Lui Peña,  "MD   5 mg at 10/22/24 0120    Or    oxyCODONE IR (ROXICODONE) tablet 10 mg  10 mg Oral Q4H PRN Lui Peña MD        polyethylene glycol (MIRALAX) Packet 17 g  17 g Oral BID PRN Lui Peña MD        prochlorperazine (COMPAZINE) injection 5 mg  5 mg Intravenous Q6H PRN Lui Peña MD        Or    prochlorperazine (COMPAZINE) tablet 5 mg  5 mg Oral Q6H PRN Lui Peña MD        Or    prochlorperazine (COMPAZINE) suppository 12.5 mg  12.5 mg Rectal Q12H PRN Lui Peña MD        senna-docusate (SENOKOT-S/PERICOLACE) 8.6-50 MG per tablet 1 tablet  1 tablet Oral BID PRN Lui Peña MD        Or    senna-docusate (SENOKOT-S/PERICOLACE) 8.6-50 MG per tablet 2 tablet  2 tablet Oral BID PRN Lui Peña MD        sodium chloride (PF) 0.9% PF flush 3 mL  3 mL Intracatheter q1 min prn Lui Peña MD        sodium chloride (PF) 0.9% PF flush 3 mL  3 mL Intracatheter q1 min prn Lui Peña MD           ROS: 10 point ROS neg other than the symptoms noted above in the HPI.    Vitals:    /70 (BP Location: Right arm)   Pulse 68   Temp 97.6  F (36.4  C) (Oral)   Resp 18   Ht 1.88 m (6' 2\")   Wt 104.1 kg (229 lb 6.4 oz)   SpO2 95%   BMI 29.45 kg/m    Body mass index is 29.45 kg/m .  I/O last 3 completed shifts:  In: -   Out: 175 [Urine:175]    Exam:    Patient appears comfortable, conversational, and in no apparent distress.   Head: Normocephalic, without obvious abnormality, atraumatic, no facial asymmetry.   Eyes: conjunctivae/corneas clear. PERRL, EOM's intact.   Throat: lips, mucosa, and tongue normal; teeth and gums normal.   Neck: supple, symmetrical, trachea midline, no adenopathy and thyroid: not enlarged, symmetric, no tenderness/mass/nodules.   Lungs: clear to auscultation bilaterally.   Heart: regular rate and rhythm.   Abdomen: soft, non-tender; bowel sounds normal; no masses, no organomegaly.   Pulses: 2+ and " symmetric.   Skin: Skin color, texture, turgor normal. No rashes or lesions.     CN II-XII grossly intact, alert and appropriate with conversation and following commands.   Gait is non-antalgic. Able to tandem walk. Able to walk on toes and heels without difficulty.   Cervical spine is non tender to palpation. Appropriate range of motion of neck, not concerning for lhermitte's phenomenon.   Bilateral bicep 2/4 and tricep reflexes 1/4. Sensation intact throughout upper extremities.     UE muscle strength  Right  Left    Deltoid  5/5  5/5    Biceps  5/5  5/5    Triceps  5/5  5/5    Hand intrinsics  5/5  5/5    Hand grasp  5/5  5/5    [place] signs  neg  neg      Lumbar spine is non tender to palpation.   Intact sensation throughout lower extremities.   Bilateral patellar 2/4 and achilles reflex 1/4. Negative for pain with straight leg raise.     LE muscle strength  Right  Left    Iliopsoas (hip flexion)  5/5  5/5    Quad (knee extension)  5/5  5/5    Hamstring (knee flexion)  5/5  5/5    Gastrocnemius (PF)  5/5  5/5    Tibialis Ant. (DF)  5/5  5/5    EHL  5/5  5/5      Negative Babinski bilaterally. Negative for clonus.   Calves are soft and non-tender bilaterally.     ECG/Telemetry:    ECG taken at 1944, ECG read at 2031  Sinus rhythm with premature supraventricular complexes  T wave abnormality, consider inferior ischemia  Abnormal ECG   T inversions where in 3 as, dated 12/14/10.  Rate 100 bpm. WV interval 172 ms. QRS duration 78 ms. QT/QTc 380/490 ms. P-R-T axes 56 35 -2.    Imaging:    Impression per radiology read - I have personally reviewed the images with the patient.    CT CHEST/ABDOMEN/PELVIS W CONTRAST  LOCATION: [facility]  DATE: 10/21/2024     INDICATION: Left-sided chest pain after fall downstairs.  COMPARISON: CT chest 8/6/2024.  TECHNIQUE: CT scan of the chest, abdomen, and pelvis was performed following injection of IV contrast. Multiplanar reformats were obtained. Dose  reduction techniques were used.   CONTRAST: 100mL Isovue 370     FINDINGS: Patient was imaged with arm(s) at side, limiting study quality.     LUNGS AND PLEURA: Trachea and large airways are patent. No focal airspace consolidation. Mild dependent atelectatic change.     No suspicious pulmonary nodule.     No pneumothorax.     Trace left pleural fluid.     MEDIASTINUM/AXILLAE: Unchanged mild mediastinal lymphadenopathy. No appreciable hilar lymphadenopathy.      Thoracic esophagus is unremarkable.     No axillary lymphadenopathy.     Mild bilateral gynecomastia.     Ascending thoracic aortic aneurysm, measuring 4.2 cm, unchanged. Mild atheromatous disease.     Mild cardiomegaly. No pericardial effusion.     CORONARY ARTERY CALCIFICATION: Mild.     HEPATOBILIARY: Mild hepatic steatosis.     Gallbladder is normal.     No intrahepatic or intrahepatic biliary ductal dilatation.     PANCREAS: Enhances normally. No peripancreatic inflammatory fat stranding.     SPLEEN: Enhances normally. Normal size.     ADRENAL GLANDS: Normal.     KIDNEYS: Both kidneys enhance symmetrically, without hydronephrosis. Benign appearing renal cyst(s), in addition to other low attenuation renal lesion(s), which are too small to characterize, though statistically likely to represent simple cysts; no   further follow up recommended. Additional small parapelvic cysts at the inferior pole left kidney.     Tiny nonobstructing stones of the inferior pole left kidney.     Urinary bladder is unremarkable.     PELVIC ORGANS: No suspicious abnormality.     BOWEL: No evidence of acute gastrointestinal inflammation or obstruction. Normal appendix.     No intraperitoneal free fluid or free air. Small bilateral fat-containing inguinal hernias.     LYMPH NODES: No suspicious abdominal or pelvic lymphadenopathy.     VASCULATURE: No abdominal aortic aneurysm. Mild atheromatous disease.     MUSCULOSKELETAL: Acute, nondisplaced to mildly displaced fractures of  "the lateral left third through eighth ribs. Multiple old bilateral anterolateral rib fracture deformities. Old fracture deformity of the posterolateral right 12th rib. Acute,   nondisplaced fracture of the left L2 transverse process. Chronic, mild superior endplate height loss at the T3 vertebral body. Elastofibroma dorsi. Left glenohumeral joint effusion.     OTHER: No additionally significant abnormalities.                                                                   IMPRESSION:   1.  Acute, nondisplaced to mildly displaced fractures of the lateral left third through eighth ribs.  2.  Acute, nondisplaced fracture of the left L2 transverse process.  3.  Trace left pleural fluid. No pneumothorax.  4.  Unchanged ascending thoracic aortic aneurysm, measuring 4.2 cm.  5.  Mild hepatic steatosis.  6.  Nonobstructing left nephrolithiasis.    Available labs at time of consult:       Recent Labs   Lab 10/22/24  0546 10/21/24  2004   WBC 7.8 5.7   HGB 11.5* 12.1*   HCT 36.9* 38.8*   MCV 92 90    245     Recent Labs   Lab 10/22/24  0546 10/21/24  2004   WBC 7.8 5.7   HGB 11.5* 12.1*   HCT 36.9* 38.8*   MCV 92 90    245     No results for input(s): \"SED\", \"CRP\" in the last 168 hours.  Recent Labs   Lab 10/22/24  0546 10/21/24  2004   HGB 11.5* 12.1*     No results for input(s): \"INR\" in the last 168 hours.  Recent Labs   Lab 10/22/24  0546 10/21/24  2004    245     Recent Labs   Lab 10/22/24  0546 10/21/24  2004   WBC 7.8 5.7         Respectfully,    Nomi Lopes MPAS, PAOBED  [facility] Neurosurgery  [facility]     Tel: [phone]      All imaging, physical findings, and the above plan have been reviewed with Dr. Ibrahim.    "

## 2024-10-22 NOTE — ED NOTES
Daughter up to the desk, complaining is in pain.   Noted that registration told her dad to lay on floor, upset and crying. Provided with patient relations card and apologized. Found a room for patient in the main ER to lay down.

## 2024-10-22 NOTE — H&P
[facility]  Hospitalist Admission Note  Name: Guanako Padilla    MRN: [id number]  YOB: 1955    Age: 69 year old  Date of admission: 10/21/2024  Primary care provider: Roberto Vega    Chief Complaint: Fall, left sided chest pain    Assessment and Plan:   Fall  Acute traumatic left 3rd-8th rib fractures  Acute traumatic L2 transverse process fracture: He was walking down the stairs on the deck at home and is a little bit dizzy and he is falling landing on his back and left side.  He fell down about 7-8 stairs.  He is reporting left lateral chest pain that is worse with movement and taking a deep breath.  He did hit his head, but did not have loss of consciousness.  CT head unremarkable.  X-ray of the right hand shows osteoarthritis.  CT chest/ab/pelvis shows mildly displaced left lateral 3rd-8th rib fractures and a L2 transverse process fracture.  Contacted general surgery for trauma eval contacted thoracic surgery who recommended admission here for pain control and no current indication for bleeding.  -Consult general surgery for trauma evaluation  -Consult thoracic surgery given the multiple rib fractures in case plating will be needed fails  -Consult anesthesia for nerve block  -Consult neurosurgery for the L2 fracture  -Consult PT/OT/SW, fall precautions  -Scheduled acetaminophen 975 mg 3 times daily, scheduled gabapentin 100 mg 3 times daily, scheduled methocarbamol 500 mg 3 times daily, place lidocaine patch, give 1 dose IV Toradol now, ibuprofen 400 mg every 6 hours as needed, oxycodone 5 mg for moderate and 10 mg for severe pain every 4 hours as needed, IV Dilaudid 0.2 mg for moderate and 0.4 mg for severe acute pain every 2 hours as needed  -Daily chest x-ray ordered x 2  -Incentive spirometer, continuous pulse ox    Cognitive impairment  Ataxia: He is currently following with neurology as an outpatient for worsening cognitive impairment and ataxia with dizziness with  ambulation.  This has been progressive ever since a bad fall couple years ago per his daughter.  -Fall precautions    HLD: Resume PTA atorvastatin 10 mg at bedtime.    Elevated troponin: Troponin T was elevated at 30 repeat downtrending to 27.  EKG shows NSR with no ST elevation or depression.  Chest pain is secondary to his rib fractures.  Has trace-1+ bilateral extremity edema on exam.     -I do not see any TTE on file and he has ongoing issues with dizziness with ambulation so I ordered TTE    Ascending thoracic aortic aneurysm: 4.2 cm ascending thoracic aortic aneurysm seen on CT.    History of gastric ulcer: Ordered pantoprazole daily in case he is receiving any as needed ibuprofen.    BPH: Monitor for any retention.  Not currently on Flomax.    SHREYA: Unclear if using CPAP or not, may need to add this while here if not meeting incentive spirometry goals per the rib fracture order sent.    Obesity: BMI 31.    DVT Prophylaxis: Pneumatic Compression Devices  Code Status: Full Code  FEN: Regular diet  Discharge Dispo: Consult PT/OT/SW.  Currently lives at home with family.  They are looking into moving somewhere that is not of stairs.  Estimated Disch Date / # of Days until Disch: Admit inpatient for fall with multiple rib fractures and L2 transverse process fracture.  Anticipate 2-3 night hospitalization for pain control and finding appropriate disposition.      History of Present Illness:  Guanako Padilla is a 69 year old male with PMH including cognitive impairment with ataxia and dizziness, HLD, gastric ulcer, BPH, SHREYA, and obesity who presents after a fall at home.  He was walking down the stairs on his deck when he slipped and fell backwards landing on his back and left side.  He fell down about 7-8 stairs.  He was reporting left lateral chest pain.  He had a right hand laceration and also cut to the arm and elbow.  He did hit his head, but did not have loss conscious.  He was having some dizziness prior to  falling which is not unusual for him.  His daughter says that he had a bad fall about 2 years ago and since then he said some worsening cognitive impairment and persistent dizziness and balance difficulties.  They are following with neurology in the outpatient setting.  He currently has improved pain control following IV opiates that he received.  He does have increased pain with taking a deep breath, but does not feel short of breath.    History obtained from patient, patient's daughter secondary to his cognitive impairment, medical record, and from Dr. Ortiz in the emergency department.  Blood pressure 131/96, heart rate 92, temperature 98.3  F, oxygen 96% on room air.  WBC 5.7, hemoglobin 12.0, platelet count 245.  Sodium is 145, potassium 4.2, chloride 109, bicarb 19, BUN 24, creatinine 1.01, glucose 157.  Troponin T elevated 30 with repeat 27.  EKG shows NSR with no ST elevation or depression.  Right hand x-ray shows osteoarthritis.  CT of the head is unremarkable.  CT chest/ab/pelvis shows mildly displaced lateral left 3rd-8th rib fractures, acute L2 transverse process fracture, left nonobstructive kidney stone, 4.2 cm ascending aortic aneurysm.  He received Zofran, 4 mg IV morphine, and 1 mg IV Dilaudid.  Pain control is improved.  Dr. Ortiz did speak with thoracic surgery who recommended admission here for pain control and no current indication for plating procedure.  Admit inpatient.       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 Covarrubias [facility]    COLONOSCOPY  " 2016    Procedure: COLONOSCOPY;  Surgeon: Juan Covarrubias MD;  Location:  GI    ESOPHAGOSCOPY, GASTROSCOPY, DUODENOSCOPY (EGD), COMBINED  2016    Dr. Covarrubias [facility]    ESOPHAGOSCOPY, GASTROSCOPY, DUODENOSCOPY (EGD), COMBINED N/A 2016    Procedure: COMBINED ESOPHAGOSCOPY, GASTROSCOPY, DUODENOSCOPY (EGD), BIOPSY SINGLE OR MULTIPLE;  Surgeon: Juan Covarrubias MD;  Location:  GI    ORTHOPEDIC SURGERY Left     left shoulder, rotator cuff repair    ORTHOPEDIC SURGERY Right     wrist tendon repair    ORTHOPEDIC SURGERY Right     right foot fx-metal in foot-ORIF    ORTHOPEDIC SURGERY Right     Right elbow, dx \"little league elbow\", it was cleaned out    ORTHOPEDIC SURGERY Right     Right shoulder, rotator cuff repair     Social History reviewed:  Social History     Tobacco Use    Smoking status: Former     Current packs/day: 0.00     Types: Cigarettes     Start date: 2015     Quit date: 2022     Years since quittin.8    Smokeless tobacco: Never   Substance Use Topics    Alcohol use: Yes     Comment: 1 1/2 beers per day     Social History     Social History Narrative    , 3 children    Employed for [employer]     Family History reviewed:  Family History   Problem Relation Age of Onset    Alzheimer Disease Mother         Dementia    Anxiety Disorder Mother     Mental Illness Mother         Alzheimer's    Osteoporosis Mother     Cerebrovascular Disease Father     Diabetes Father     Hyperlipidemia Father     Cancer Maternal Grandfather     Diabetes Brother     Depression Sister     Colon Cancer No family hx of      Allergies:  No Known Allergies  Medications:  Prior to Admission medications    Medication Sig Last Dose Taking? Auth Provider Long Term End Date   atorvastatin (LIPITOR) 10 MG tablet Take 10 mg by mouth at bedtime.  Yes Reported, Patient Yes    Bioflavonoid Products (VITAMIN C) CHEW    Reported, Patient     Cholecalciferol (VITAMIN D3 PO) Take by mouth " daily   Reported, Patient     ibuprofen (ADVIL/MOTRIN) 200 MG tablet Take 200 mg by mouth every 4 hours as needed for pain   Reported, Patient     magnesium oxide 400 MG CAPS    Reported, Patient     omeprazole (PRILOSEC) 20 MG DR capsule Take 20 mg by mouth   Reported, Patient     polyethylene glycol (MIRALAX) 17 GM/Dose powder Take 1 capful by mouth daily   Reported, Patient       Review of Systems:  Somewhat limited due to his cognitive impairment, see HPI for details     Physical Exam:  Blood pressure 121/82, pulse 86, temperature 98.3  F (36.8  C), temperature source Temporal, resp. rate 18, SpO2 93%.  Wt Readings from Last 1 Encounters:   11/29/23 (P) 111.6 kg (246 lb)     Exam:  Constitutional: Awake, NAD  Eyes: sclera white  HEENT: MMM  Respiratory: Appears to be breathing relatively comfortably on room air.  No focal crackles or wheeze  Cardiovascular: RRR.  No appreciable murmur  GI: non-tender, not distended, bowel sounds present  Skin: no rash   Musculoskeletal/extremities: No major trauma or deformities to his extremities.  Trace-1+ lower extremity edema bilaterally.  Neurologic: Alert, somewhat slow to answer questions and looks to his daughter for some answers, speech is clear, but can move all extremities equally, light touch sensation intact  Psychiatric: calm, cooperative     Lab and imaging data personally reviewed:  Labs:  Recent Labs   Lab 10/21/24  2004   WBC 5.7   HGB 12.1*   HCT 38.8*   MCV 90        Recent Labs   Lab 10/21/24  2004      POTASSIUM 4.2   CHLORIDE 109*   CO2 19*   ANIONGAP 17*   *   BUN 24.7*   CR 1.01   GFRESTIMATED 81   SARY 8.8     Troponin T 30 with repeat 27    EKG: NSR, no ST elevation or depression    Imaging:  Recent Results (from the past 24 hour(s))   Head CT w/o contrast    Narrative    EXAM: CT HEAD W/O CONTRAST  LOCATION: [facility]  DATE: 10/21/2024    INDICATION: head injury after fall  COMPARISON: None.  TECHNIQUE:  Routine CT Head without IV contrast. Multiplanar reformats. Dose reduction techniques were used.    FINDINGS:  INTRACRANIAL CONTENTS: No evidence of acute intracranial hemorrhage or mass effect. Scattered foci of decreased attenuation within the cerebral hemispheric white matter which are nonspecific, though most commonly ascribed to chronic small vessel ischemic   disease. The ventricles and sulci are prominent consistent with mild brain parenchymal volume loss. Gray-white matter differentiation is maintained. The basilar cisterns are patent.    VISUALIZED ORBITS/SINUSES/MASTOIDS: The globes are unremarkable. The partially imaged paranasal sinuses, mastoid air cells and middle ear cavities are unremarkable.     BONES/SOFT TISSUES: The visualized skull base and calvarium are unremarkable.      Impression    IMPRESSION:    1.  No evidence of acute intracranial hemorrhage or mass effect.   CT Chest/Abdomen/Pelvis w Contrast    Narrative    EXAM: CT CHEST/ABDOMEN/PELVIS W CONTRAST  LOCATION: [facility]  DATE: 10/21/2024    INDICATION: Left-sided chest pain after fall downstairs.  COMPARISON: CT chest 8/6/2024.  TECHNIQUE: CT scan of the chest, abdomen, and pelvis was performed following injection of IV contrast. Multiplanar reformats were obtained. Dose reduction techniques were used.   CONTRAST: 100mL Isovue 370    FINDINGS: Patient was imaged with arm(s) at side, limiting study quality.    LUNGS AND PLEURA: Trachea and large airways are patent. No focal airspace consolidation. Mild dependent atelectatic change.     No suspicious pulmonary nodule.    No pneumothorax.    Trace left pleural fluid.     MEDIASTINUM/AXILLAE: Unchanged mild mediastinal lymphadenopathy. No appreciable hilar lymphadenopathy.     Thoracic esophagus is unremarkable.    No axillary lymphadenopathy.    Mild bilateral gynecomastia.    Ascending thoracic aortic aneurysm, measuring 4.2 cm, unchanged. Mild atheromatous  disease.    Mild cardiomegaly. No pericardial effusion.    CORONARY ARTERY CALCIFICATION: Mild.    HEPATOBILIARY: Mild hepatic steatosis.    Gallbladder is normal.    No intrahepatic or intrahepatic biliary ductal dilatation.    PANCREAS: Enhances normally. No peripancreatic inflammatory fat stranding.    SPLEEN: Enhances normally. Normal size.    ADRENAL GLANDS: Normal.    KIDNEYS: Both kidneys enhance symmetrically, without hydronephrosis. Benign appearing renal cyst(s), in addition to other low attenuation renal lesion(s), which are too small to characterize, though statistically likely to represent simple cysts; no   further follow up recommended. Additional small parapelvic cysts at the inferior pole left kidney.    Tiny nonobstructing stones of the inferior pole left kidney.    Urinary bladder is unremarkable.    PELVIC ORGANS: No suspicious abnormality.    BOWEL: No evidence of acute gastrointestinal inflammation or obstruction. Normal appendix.    No intraperitoneal free fluid or free air. Small bilateral fat-containing inguinal hernias.    LYMPH NODES: No suspicious abdominal or pelvic lymphadenopathy.    VASCULATURE: No abdominal aortic aneurysm. Mild atheromatous disease.    MUSCULOSKELETAL: Acute, nondisplaced to mildly displaced fractures of the lateral left third through eighth ribs. Multiple old bilateral anterolateral rib fracture deformities. Old fracture deformity of the posterolateral right 12th rib. Acute,   nondisplaced fracture of the left L2 transverse process. Chronic, mild superior endplate height loss at the T3 vertebral body. Elastofibroma dorsi. Left glenohumeral joint effusion.    OTHER: No additionally significant abnormalities.      Impression    IMPRESSION:   1.  Acute, nondisplaced to mildly displaced fractures of the lateral left third through eighth ribs.  2.  Acute, nondisplaced fracture of the left L2 transverse process.  3.  Trace left pleural fluid. No pneumothorax.  4.   Unchanged ascending thoracic aortic aneurysm, measuring 4.2 cm.  5.  Mild hepatic steatosis.  6.  Nonobstructing left nephrolithiasis.     XR Hand Right G/E 3 Views    Narrative    EXAM: XR HAND RIGHT G/E 3 VIEWS  LOCATION: [facility]  DATE: 10/21/2024    INDICATION: fall, pain  COMPARISON: 7/4/2011.      Impression    IMPRESSION: No acute fracture or dislocation. 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 Relatively mild osteoarthritis of the triscaphe   joint and a few other interphalangeal joints of the fingers has also worsened.       Lui Peña MD  Hospitalist  [facility]

## 2024-10-22 NOTE — ED PROVIDER NOTES
"  Emergency Department Note      History of Present Illness     Chief Complaint   Fall      HPI   Guanako Padilla is a 69 year old male who presents after a fall. The patient reports he fell backwards and twisted, landing on his left side. Reports falling down 7-8 stairs. Reports falling due to dizziness. Patient reports he might have broke a rib. Daughter reports the patient has been having problems with dizziness while standing and walked recently. Reports he has been seen by neuro/psych and has not received a diagnoses. Daughter reports needing to help the patient walk to the car after the fall. Patient denies loss of consciousness. Denies lightheadedness or seizure prior to fall.  Reports of laceration to right hand and cuts to right arm/elbow. Reports hitting head, patient has received negative head CT. Denies use of blood thinners. Daughter reports of aneurism to his liver.     Independent Historian   Daughter as detailed above.    Review of External Notes   I reviewed EKG from December 2010    Past Medical History     Medical History and Problem List   Gait ataxia  BPPV  Dizziness  Frequent falls  SHREYA on CPAP  Pure hypercholesterolemia  Dementia  Arthritis of elbow  Bilateral carpal  Tunnel  Shoulder impingement syndrome  Iron deficiency anemia  Peptic ulcer  Primary ostearthritis of both knees  Chronic right shoulder pain  Shingles     Medications   Lipitor  Colace  Crestor  Vit C  Vit D3  Colcrys  Norco  Prilosec  Miralax  Deltasone  Ultram    Surgical History   Colonoscopy x2  Esophagoscopy, gastroscopy, duodenoscopy x2  Right shoulder surgery x4  Left shoulder surgery     Physical Exam     Patient Vitals for the past 24 hrs:   BP Temp Temp src Pulse Resp SpO2 Height Weight   10/22/24 0206 -- -- -- -- 18 93 % -- --   10/22/24 0156 (!) 145/78 98  F (36.7  C) Oral 93 18 93 % 1.88 m (6' 2\") 104.1 kg (229 lb 6.4 oz)   10/22/24 0100 121/82 -- -- 86 -- 93 % -- --   10/22/24 0030 (!) 130/91 -- -- 85 -- 93 % -- -- "   10/22/24 0015 -- -- -- -- -- 93 % -- --   10/22/24 0000 118/79 -- -- 83 -- 92 % -- --   10/21/24 2330 -- -- -- 87 -- 91 % -- --   10/21/24 2315 129/84 -- -- -- -- -- -- --   10/21/24 2102 (!) 149/101 -- -- 95 -- 96 % -- --   10/21/24 2100 (!) 149/101 -- -- 95 -- -- -- --   10/21/24 1937 (!) 131/96 98.3  F (36.8  C) Temporal 92 18 96 % -- --     Physical Exam  General: The patient is alert, in no respiratory distress.    HENT: Mucous membranes moist.    Cardiovascular: Regular rate and rhythm. Good pulses in all four extremities. Normal capillary refill and skin turgor.     Respiratory: Lungs are clear. No nasal flaring. No retractions. No wheezing, no crackles.    Gastrointestinal: Abdomen soft. No guarding, no rebound. No palpable hernias.     Musculoskeletal: Left sided chest wall tenderness. No gross deformity.     Skin: Abrasion to arm and flank on left side. 2 cm laceration to dorsal right hand. No rashes or petechiae.     Neurologic: The patient is alert and oriented x3. GCS 15. No testable cranial nerve deficit. Follows commands with clear and appropriate speech. Gives appropriate answers. Good strength in all extremities. No gross neurologic deficit. Gross sensation intact. Pupils are round and reactive. No meningismus.     Lymphatic: No cervical adenopathy. No lower extremity swelling.    Psychiatric: The patient is non-tearful.      Diagnostics     Lab Results   Labs Ordered and Resulted from Time of ED Arrival to Time of ED Departure   BASIC METABOLIC PANEL - Abnormal       Result Value    Sodium 145      Potassium 4.2      Chloride 109 (*)     Carbon Dioxide (CO2) 19 (*)     Anion Gap 17 (*)     Urea Nitrogen 24.7 (*)     Creatinine 1.01      GFR Estimate 81      Calcium 8.8      Glucose 157 (*)    TROPONIN T, HIGH SENSITIVITY - Abnormal    Troponin T, High Sensitivity 30 (*)    CBC WITH PLATELETS AND DIFFERENTIAL - Abnormal    WBC Count 5.7      RBC Count 4.33 (*)     Hemoglobin 12.1 (*)      Hematocrit 38.8 (*)     MCV 90      MCH 27.9      MCHC 31.2 (*)     RDW 14.3      Platelet Count 245      % Neutrophils 76      % Lymphocytes 13      % Monocytes 9      % Eosinophils 1      % Basophils 1      % Immature Granulocytes 1      NRBCs per 100 WBC 0      Absolute Neutrophils 4.3      Absolute Lymphocytes 0.7 (*)     Absolute Monocytes 0.5      Absolute Eosinophils 0.1      Absolute Basophils 0.0      Absolute Immature Granulocytes 0.0      Absolute NRBCs 0.0     TROPONIN T, HIGH SENSITIVITY - Abnormal    Troponin T, High Sensitivity 27 (*)    VITAMIN D DEFICIENCY SCREENING       Imaging   XR Hand Right G/E 3 Views   Final Result   IMPRESSION: No acute fracture or dislocation. 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 Relatively mild osteoarthritis of the triscaphe    joint and a few other interphalangeal joints of the fingers has also worsened.      CT Chest/Abdomen/Pelvis w Contrast   Final Result   IMPRESSION:    1.  Acute, nondisplaced to mildly displaced fractures of the lateral left third through eighth ribs.   2.  Acute, nondisplaced fracture of the left L2 transverse process.   3.  Trace left pleural fluid. No pneumothorax.   4.  Unchanged ascending thoracic aortic aneurysm, measuring 4.2 cm.   5.  Mild hepatic steatosis.   6.  Nonobstructing left nephrolithiasis.         Head CT w/o contrast   Final Result   IMPRESSION:     1.  No evidence of acute intracranial hemorrhage or mass effect.      Echocardiogram Complete    (Results Pending)       EKG   ECG taken at 1944, ECG read at 2031  Sinus rhythm with premature supraventricular complexes  T wave abnormality, consider inferior ischemia  Abnormal ECG   T inversions where in 3 as, dated 12/14/10.  Rate 100 bpm. IL interval 172 ms. QRS duration 78 ms. QT/QTc 380/490 ms. P-R-T axes 56 35 -2.    Independent Interpretation   CT Head: No  intracranial hemorrhage.  Hand XR: no fracture    ED Course      Medications Administered   Medications   lidocaine 1 % 0.1-1 mL (has no administration in time range)   lidocaine (LMX4) cream (has no administration in time range)   sodium chloride (PF) 0.9% PF flush 3 mL (3 mLs Intracatheter Not Given 10/21/24 2344)   sodium chloride (PF) 0.9% PF flush 3 mL (has no administration in time range)   atorvastatin (LIPITOR) tablet 10 mg (has no administration in time range)   lidocaine 1 % 0.1-1 mL (has no administration in time range)   lidocaine (LMX4) cream (has no administration in time range)   sodium chloride (PF) 0.9% PF flush 3 mL (3 mLs Intracatheter Not Given 10/22/24 0111)   sodium chloride (PF) 0.9% PF flush 3 mL (has no administration in time range)   senna-docusate (SENOKOT-S/PERICOLACE) 8.6-50 MG per tablet 1 tablet (has no administration in time range)     Or   senna-docusate (SENOKOT-S/PERICOLACE) 8.6-50 MG per tablet 2 tablet (has no administration in time range)   calcium carbonate (TUMS) chewable tablet 1,000 mg (has no administration in time range)   acetaminophen (TYLENOL) tablet 975 mg (975 mg Oral $Given 10/22/24 0119)   Lidocaine (LIDOCARE) 4 % Patch 1 patch (has no administration in time range)   gabapentin (NEURONTIN) capsule 100 mg (100 mg Oral $Given 10/22/24 0120)   methocarbamol (ROBAXIN) tablet 500 mg (500 mg Oral $Given 10/22/24 0120)   polyethylene glycol (MIRALAX) Packet 17 g (has no administration in time range)   melatonin tablet 1 mg (has no administration in time range)   ondansetron (ZOFRAN ODT) ODT tab 4 mg (has no administration in time range)     Or   ondansetron (ZOFRAN) injection 4 mg (has no administration in time range)   prochlorperazine (COMPAZINE) injection 5 mg (has no administration in time range)     Or   prochlorperazine (COMPAZINE) tablet 5 mg (has no administration in time range)     Or   prochlorperazine (COMPAZINE) suppository 12.5 mg (has no administration in  time range)   oxyCODONE (ROXICODONE) tablet 5 mg (5 mg Oral $Given 10/22/24 0120)     Or   oxyCODONE IR (ROXICODONE) tablet 10 mg ( Oral See Alternative 10/22/24 0120)   HYDROmorphone (DILAUDID) injection 0.2 mg (has no administration in time range)     Or   HYDROmorphone (DILAUDID) injection 0.4 mg (has no administration in time range)   ibuprofen (ADVIL/MOTRIN) tablet 400 mg (has no administration in time range)   pantoprazole (PROTONIX) EC tablet 40 mg (has no administration in time range)   lido-EPINEPHrine-tetracaine (LET) topical gel GEL ( Topical $Given 10/21/24 2052)   lido-EPINEPHrine-tetracaine (LET) topical gel GEL ( Topical $Given 10/21/24 2110)   iopamidol (ISOVUE-370) solution 500 mL (100 mLs Intravenous $Given 10/21/24 2151)   CT scan flush (65 mLs Intravenous $Given 10/21/24 2151)   HYDROmorphone (DILAUDID) injection 1 mg (1 mg Intravenous $Given 10/21/24 2319)   ketorolac (TORADOL) injection 15 mg (15 mg Intravenous $Given 10/22/24 0119)       Procedures   Procedures     Laceration Repair      Procedure: Laceration Repair    Indication: Laceration    Consent: Verbal    Tetanus status reviewed    Location: Right Hand     Length: 3 cm    Preparation: Irrigation with Sterile Saline.    Anesthesia/Sedation: Topical -LET and Lidocaine - 1%      Treatment/Exploration: Wound explored, no foreign bodies found     Closure: The wound was closed with one layer. Skin/superficial layer was closed with 11 x 4-0 Nylon using Interrupted sutures.     Patient Status: The patient tolerated the procedure well: Yes. There were no complications.      Discussion of Management   I discussed the case with Dr. Jeronimo of general surgery due to the patient having trauma I also discussed the case with Dr. YAO of thoracic surgery who did not feel the patient required any emergent surgery for the multiple rib fractures.    ED Course   ED Course as of 10/22/24 0300   Mon Oct 21, 2024   2037 I obtained history and examined the  patient as noted above     2124 I rechecked the patient and explained findings.  Lac procedure     2324 I spoke with Dr. Schaefer, surgery, regarding care for the patient   2326 I rechecked the patient and explained findings.     2345 I spoke with Dr. Peña, hospitalist           Medical Decision Making / Diagnosis     CMS Diagnoses: None    MIPS      None    MDM   Guanako Padilla is a 69 year old male who is the historian as well as his daughter he has been worked up extensively as an outpatient for episodes of dizziness and falling.  Unfortunately was going up the stairs tonight fell backwards he had multiple abrasions and a laceration of his right hand that I did suture.  The patient unfortunately did suffer multiple rib fractures there was no flail chest he had not have hypoxia I discussed the case with thoracic surgery did not require feel he required any emergent intervention I also talked with discussed with general surgery because of a trauma the patient had there is no signs of intrathoracic or intra-abdominal injury he was admitted to hospital in good condition.    Disposition   The patient was admitted to the hospital.     Diagnosis     ICD-10-CM    1. Closed fracture of multiple ribs of left side, initial encounter  S22.42XA       2. Closed fracture of transverse process of lumbar vertebra, initial encounter (H)  S32.009A       3 laceration hand            Scribe Disclosure:  I, Tien Ennis, am serving as a scribe at 8:34 PM on 10/21/2024 to document services personally performed by Valentin Ortiz MD based on my observations and the provider's statements to me.        Valentin Ortiz MD  10/22/24 0303

## 2024-10-22 NOTE — PLAN OF CARE
"End of Shift Summary  For vital signs and complete assessments, please see documentation flowsheets.     Pertinent assessments: VSS. Afebrile. LS clear. BS x4. 2+ BLE edema noted. Pt rating pain 7/10, comfortable with that score. Denies nausea. Regular diet. SBA with belt to ambulate, unsteady at times. PIV - SL. Bruising noted on (R) flank/back areas. Tele monitoring - SR with Inverted T-waves, PACs and PVCs, HR 89. A&O, forgetful at times; alarm on bed for safety. Slept well.    Major Shift Events: none    Treatment Plan: Pain management, Encourage activity and use of IS, Discharge TBD.      Goal Outcome Evaluation:      Plan of Care Reviewed With: patient    Overall Patient Progress: no change    Outcome Evaluation: Pain 7/10, no PRns given, Denies nauesa, Brusied on (R) back/flank      Problem: Adult Inpatient Plan of Care  Goal: Plan of Care Review  Description: The Plan of Care Review/Shift note should be completed every shift.  The Outcome Evaluation is a brief statement about your assessment that the patient is improving, declining, or no change.  This information will be displayed automatically on your shift  note.  Outcome: Not Progressing  Flowsheets (Taken 10/22/2024 0661)  Outcome Evaluation: Pain 7/10, no PRns given, Denies nauesa, Brusied on (R) back/flank  Plan of Care Reviewed With: patient  Overall Patient Progress: no change  Goal: Patient-Specific Goal (Individualized)  Description: You can add care plan individualizations to a care plan. Examples of Individualization might be:  \"Parent requests to be called daily at 9am for status\", \"I have a hard time hearing out of my right ear\", or \"Do not touch me to wake me up as it startles  me\".  Outcome: Not Progressing  Goal: Absence of Hospital-Acquired Illness or Injury  Outcome: Not Progressing  Intervention: Identify and Manage Fall Risk  Recent Flowsheet Documentation  Taken 10/22/2024 0206 by Allison Matamoros RN  Safety Promotion/Fall " Prevention:   activity supervised   assistive device/personal items within reach   clutter free environment maintained   increased rounding and observation   increase visualization of patient   lighting adjusted   mobility aid in reach   nonskid shoes/slippers when out of bed   patient and family education   room near nurse's station   room organization consistent   safety round/check completed   supervised activity   treat reversible contributory factors   treat underlying cause  Intervention: Prevent Skin Injury  Recent Flowsheet Documentation  Taken 10/22/2024 0206 by Allison Matamoros RN  Body Position:   position changed independently   supine, head elevated  Intervention: Prevent Infection  Recent Flowsheet Documentation  Taken 10/22/2024 0206 by Allison Matamoros RN  Infection Prevention:   cohorting utilized   rest/sleep promoted   single patient room provided  Goal: Optimal Comfort and Wellbeing  Outcome: Not Progressing  Goal: Readiness for Transition of Care  Outcome: Not Progressing  Intervention: Mutually Develop Transition Plan  Recent Flowsheet Documentation  Taken 10/22/2024 0206 by Allison Matamoros RN  Equipment Currently Used at Home: shower chair     Problem: Pain Acute  Goal: Optimal Pain Control and Function  Outcome: Not Progressing  Intervention: Prevent or Manage Pain  Recent Flowsheet Documentation  Taken 10/22/2024 0206 by Allison Matamoros RN  Medication Review/Management: medications reviewed     Problem: Skin Injury Risk Increased  Goal: Skin Health and Integrity  Outcome: Not Progressing  Intervention: Optimize Skin Protection  Recent Flowsheet Documentation  Taken 10/22/2024 0206 by Allison Matamoros RN  Activity Management:   activity adjusted per tolerance   up ad lakhwinder  Head of Bed (HOB) Positioning: HOB at 20 degrees

## 2024-10-23 ENCOUNTER — APPOINTMENT (OUTPATIENT)
Dept: GENERAL RADIOLOGY | Facility: CLINIC | Age: 69
DRG: 184 | End: 2024-10-23
Attending: INTERNAL MEDICINE
Payer: COMMERCIAL

## 2024-10-23 ENCOUNTER — APPOINTMENT (OUTPATIENT)
Dept: PHYSICAL THERAPY | Facility: CLINIC | Age: 69
DRG: 184 | End: 2024-10-23
Payer: COMMERCIAL

## 2024-10-23 PROCEDURE — 71045 X-RAY EXAM CHEST 1 VIEW: CPT

## 2024-10-23 PROCEDURE — 120N000001 HC R&B MED SURG/OB

## 2024-10-23 PROCEDURE — 250N000013 HC RX MED GY IP 250 OP 250 PS 637: Performed by: INTERNAL MEDICINE

## 2024-10-23 PROCEDURE — 97116 GAIT TRAINING THERAPY: CPT | Mod: GP

## 2024-10-23 PROCEDURE — 250N000013 HC RX MED GY IP 250 OP 250 PS 637: Performed by: PHYSICIAN ASSISTANT

## 2024-10-23 PROCEDURE — 250N000011 HC RX IP 250 OP 636: Performed by: INTERNAL MEDICINE

## 2024-10-23 PROCEDURE — 99231 SBSQ HOSP IP/OBS SF/LOW 25: CPT | Performed by: PHYSICIAN ASSISTANT

## 2024-10-23 PROCEDURE — 97530 THERAPEUTIC ACTIVITIES: CPT | Mod: GP

## 2024-10-23 PROCEDURE — 99232 SBSQ HOSP IP/OBS MODERATE 35: CPT | Performed by: INTERNAL MEDICINE

## 2024-10-23 PROCEDURE — 250N000009 HC RX 250: Performed by: INTERNAL MEDICINE

## 2024-10-23 RX ORDER — DOCUSATE SODIUM 100 MG/1
100 CAPSULE, LIQUID FILLED ORAL 2 TIMES DAILY
Status: DISCONTINUED | OUTPATIENT
Start: 2024-10-23 | End: 2024-10-26 | Stop reason: HOSPADM

## 2024-10-23 RX ORDER — AMOXICILLIN 250 MG
2 CAPSULE ORAL 2 TIMES DAILY
Status: DISCONTINUED | OUTPATIENT
Start: 2024-10-23 | End: 2024-10-26 | Stop reason: HOSPADM

## 2024-10-23 RX ORDER — POLYETHYLENE GLYCOL 3350 17 G/17G
17 POWDER, FOR SOLUTION ORAL 2 TIMES DAILY
Status: DISCONTINUED | OUTPATIENT
Start: 2024-10-23 | End: 2024-10-26 | Stop reason: HOSPADM

## 2024-10-23 RX ORDER — AMOXICILLIN 250 MG
1 CAPSULE ORAL 2 TIMES DAILY
Status: DISCONTINUED | OUTPATIENT
Start: 2024-10-23 | End: 2024-10-26 | Stop reason: HOSPADM

## 2024-10-23 RX ORDER — AMPICILLIN AND SULBACTAM 2; 1 G/1; G/1
3 INJECTION, POWDER, FOR SOLUTION INTRAMUSCULAR; INTRAVENOUS EVERY 6 HOURS
Status: DISCONTINUED | OUTPATIENT
Start: 2024-10-23 | End: 2024-10-26 | Stop reason: HOSPADM

## 2024-10-23 RX ORDER — BACITRACIN ZINC AND POLYMYXIN B SULFATE 500; 1000 [USP'U]/G; [USP'U]/G
OINTMENT TOPICAL 2 TIMES DAILY
Status: DISCONTINUED | OUTPATIENT
Start: 2024-10-23 | End: 2024-10-26 | Stop reason: HOSPADM

## 2024-10-23 RX ADMIN — DOCUSATE SODIUM 100 MG: 100 CAPSULE, LIQUID FILLED ORAL at 20:13

## 2024-10-23 RX ADMIN — OXYCODONE HYDROCHLORIDE 5 MG: 5 TABLET ORAL at 05:46

## 2024-10-23 RX ADMIN — OXYCODONE HYDROCHLORIDE 5 MG: 5 TABLET ORAL at 12:16

## 2024-10-23 RX ADMIN — GABAPENTIN 300 MG: 300 CAPSULE ORAL at 16:14

## 2024-10-23 RX ADMIN — METHOCARBAMOL 500 MG: 500 TABLET ORAL at 14:19

## 2024-10-23 RX ADMIN — BACITRACIN ZINC AND POLYMYXIN B SULFATE: 500; 10000 OINTMENT TOPICAL at 08:47

## 2024-10-23 RX ADMIN — GABAPENTIN 300 MG: 300 CAPSULE ORAL at 21:55

## 2024-10-23 RX ADMIN — ATORVASTATIN CALCIUM 10 MG: 10 TABLET, FILM COATED ORAL at 21:55

## 2024-10-23 RX ADMIN — METHOCARBAMOL 500 MG: 500 TABLET ORAL at 20:09

## 2024-10-23 RX ADMIN — ACETAMINOPHEN 325MG 975 MG: 325 TABLET ORAL at 00:50

## 2024-10-23 RX ADMIN — POLYETHYLENE GLYCOL 3350 17 G: 17 POWDER, FOR SOLUTION ORAL at 20:14

## 2024-10-23 RX ADMIN — AMPICILLIN SODIUM AND SULBACTAM SODIUM 3 G: 2; 1 INJECTION, POWDER, FOR SOLUTION INTRAMUSCULAR; INTRAVENOUS at 12:06

## 2024-10-23 RX ADMIN — AMPICILLIN SODIUM AND SULBACTAM SODIUM 3 G: 2; 1 INJECTION, POWDER, FOR SOLUTION INTRAMUSCULAR; INTRAVENOUS at 17:30

## 2024-10-23 RX ADMIN — OXYCODONE HYDROCHLORIDE 5 MG: 5 TABLET ORAL at 21:46

## 2024-10-23 RX ADMIN — SENNOSIDES AND DOCUSATE SODIUM 1 TABLET: 50; 8.6 TABLET ORAL at 20:14

## 2024-10-23 RX ADMIN — GABAPENTIN 300 MG: 300 CAPSULE ORAL at 08:40

## 2024-10-23 RX ADMIN — POLYETHYLENE GLYCOL 3350 17 G: 17 POWDER, FOR SOLUTION ORAL at 10:19

## 2024-10-23 RX ADMIN — OXYCODONE HYDROCHLORIDE 5 MG: 5 TABLET ORAL at 16:26

## 2024-10-23 RX ADMIN — METHOCARBAMOL 500 MG: 500 TABLET ORAL at 08:40

## 2024-10-23 RX ADMIN — ACETAMINOPHEN 325MG 975 MG: 325 TABLET ORAL at 08:40

## 2024-10-23 RX ADMIN — AMPICILLIN SODIUM AND SULBACTAM SODIUM 3 G: 2; 1 INJECTION, POWDER, FOR SOLUTION INTRAMUSCULAR; INTRAVENOUS at 23:37

## 2024-10-23 RX ADMIN — DOCUSATE SODIUM 100 MG: 100 CAPSULE, LIQUID FILLED ORAL at 10:19

## 2024-10-23 RX ADMIN — LIDOCAINE 1 PATCH: 4 PATCH TOPICAL at 08:41

## 2024-10-23 RX ADMIN — ACETAMINOPHEN 325MG 975 MG: 325 TABLET ORAL at 17:30

## 2024-10-23 RX ADMIN — PANTOPRAZOLE SODIUM 40 MG: 40 TABLET, DELAYED RELEASE ORAL at 05:46

## 2024-10-23 RX ADMIN — SENNOSIDES AND DOCUSATE SODIUM 1 TABLET: 50; 8.6 TABLET ORAL at 14:19

## 2024-10-23 ASSESSMENT — ACTIVITIES OF DAILY LIVING (ADL)
ADLS_ACUITY_SCORE: 0
ADLS_ACUITY_SCORE: 0
ADLS_ACUITY_SCORE: 31
ADLS_ACUITY_SCORE: 0
DEPENDENT_IADLS:: INDEPENDENT
ADLS_ACUITY_SCORE: 0

## 2024-10-23 NOTE — PLAN OF CARE
"End of Shift Summary  For vital signs and complete assessments, please see documentation flowsheets.     Pertinent assessments: VSS. Oriented but forgetful. Oxycodone given for left rib pain. Declined ice pack. Multiple skin tears, dressings CDI. Ty. Regular diet. Up 1 assist with walker and GB.     Major Shift Events: uneventful.     Treatment Plan: Pain management, Encourage activity and use of IS, Discharge TBD.                                                                  Problem: Adult Inpatient Plan of Care  Goal: Plan of Care Review  Description: The Plan of Care Review/Shift note should be completed every shift.  The Outcome Evaluation is a brief statement about your assessment that the patient is improving, declining, or no change.  This information will be displayed automatically on your shift  note.  Outcome: Progressing  Flowsheets (Taken 10/22/2024 2239)  Outcome Evaluation: oxycodone given for left rib pain.  Plan of Care Reviewed With: patient  Overall Patient Progress: no change  Goal: Patient-Specific Goal (Individualized)  Description: You can add care plan individualizations to a care plan. Examples of Individualization might be:  \"Parent requests to be called daily at 9am for status\", \"I have a hard time hearing out of my right ear\", or \"Do not touch me to wake me up as it startles  me\".  Outcome: Progressing  Goal: Absence of Hospital-Acquired Illness or Injury  Outcome: Progressing  Intervention: Identify and Manage Fall Risk  Recent Flowsheet Documentation  Taken 10/22/2024 2053 by Tomasa Perez, RN  Safety Promotion/Fall Prevention:   activity supervised   nonskid shoes/slippers when out of bed   safety round/check completed  Intervention: Prevent Skin Injury  Recent Flowsheet Documentation  Taken 10/22/2024 2053 by Tomasa Perez, RN  Body Position: position changed independently  Intervention: Prevent and Manage VTE (Venous Thromboembolism) Risk  Recent Flowsheet " Documentation  Taken 10/22/2024 2053 by Tomasa Perez, RN  VTE Prevention/Management: SCDs off (sequential compression devices)  Goal: Optimal Comfort and Wellbeing  Outcome: Progressing  Intervention: Monitor Pain and Promote Comfort  Recent Flowsheet Documentation  Taken 10/22/2024 2053 by Tomasa Perez, RN  Pain Management Interventions: medication (see MAR)  Goal: Readiness for Transition of Care  Outcome: Progressing   Goal Outcome Evaluation:      Plan of Care Reviewed With: patient    Overall Patient Progress: no changeOverall Patient Progress: no change    Outcome Evaluation: oxycodone given for left rib pain.

## 2024-10-23 NOTE — PLAN OF CARE
"End of Shift Summary  For vital signs and complete assessments, please see documentation flowsheets.     Pertinent assessments: VSS. Afebrile. LS clear. BS x4. 2+ BLE edema noted. Pt rates pain 6/10, gave scheduled Tylenol and PRN 5 mg Oxy given. Denies nausea. Regular diet. SBA with belt to ambulate. PIV - SL. Multiple bruises/wounds noted, dressings CDI. A&O, forgetful; alarm on bed for safety. Slept well.    Major Shift Events: none    Treatment Plan: Pain management, Encourage use of IS and activity, Discharge TBD.      Goal Outcome Evaluation:      Plan of Care Reviewed With: patient    Overall Patient Progress: no change    Outcome Evaluation: Oxy x1 and Scheduled Tylenol, Using IS, Slept well      Problem: Adult Inpatient Plan of Care  Goal: Plan of Care Review  Description: The Plan of Care Review/Shift note should be completed every shift.  The Outcome Evaluation is a brief statement about your assessment that the patient is improving, declining, or no change.  This information will be displayed automatically on your shift  note.  Outcome: Progressing  Flowsheets (Taken 10/23/2024 0608)  Outcome Evaluation: Oxy x1 and Scheduled Tylenol, Using IS, Slept well  Plan of Care Reviewed With: patient  Overall Patient Progress: no change  Goal: Patient-Specific Goal (Individualized)  Description: You can add care plan individualizations to a care plan. Examples of Individualization might be:  \"Parent requests to be called daily at 9am for status\", \"I have a hard time hearing out of my right ear\", or \"Do not touch me to wake me up as it startles  me\".  Outcome: Progressing  Goal: Absence of Hospital-Acquired Illness or Injury  Outcome: Progressing  Intervention: Identify and Manage Fall Risk  Recent Flowsheet Documentation  Taken 10/23/2024 0050 by Allison Matamoros RN  Safety Promotion/Fall Prevention:   activity supervised   assistive device/personal items within reach   clutter free environment maintained   " increased rounding and observation   increase visualization of patient   lighting adjusted   mobility aid in reach   nonskid shoes/slippers when out of bed   patient and family education   room near nurse's station   room organization consistent   safety round/check completed   supervised activity   treat reversible contributory factors   treat underlying cause  Intervention: Prevent Skin Injury  Recent Flowsheet Documentation  Taken 10/23/2024 0050 by Allison Matamoros RN  Body Position:   position changed independently   weight shifting  Intervention: Prevent and Manage VTE (Venous Thromboembolism) Risk  Recent Flowsheet Documentation  Taken 10/23/2024 0050 by Allison Matamoros RN  VTE Prevention/Management: SCDs off (sequential compression devices)  Intervention: Prevent Infection  Recent Flowsheet Documentation  Taken 10/23/2024 0050 by Allison Matamoros RN  Infection Prevention:   cohorting utilized   rest/sleep promoted   single patient room provided  Goal: Optimal Comfort and Wellbeing  Outcome: Progressing  Intervention: Monitor Pain and Promote Comfort  Recent Flowsheet Documentation  Taken 10/23/2024 0050 by Allison Matamoros RN  Pain Management Interventions: medication (see MAR)  Goal: Readiness for Transition of Care  Outcome: Progressing     Problem: Pain Acute  Goal: Optimal Pain Control and Function  Outcome: Progressing  Intervention: Develop Pain Management Plan  Recent Flowsheet Documentation  Taken 10/23/2024 0050 by Allison Matamoros RN  Pain Management Interventions: medication (see MAR)  Intervention: Prevent or Manage Pain  Recent Flowsheet Documentation  Taken 10/23/2024 0050 by Allison Matamoros RN  Medication Review/Management: medications reviewed     Problem: Skin Injury Risk Increased  Goal: Skin Health and Integrity  Outcome: Progressing  Intervention: Optimize Skin Protection  Recent Flowsheet Documentation  Taken 10/23/2024 0050 by Allison Matamoros  RN  Activity Management:   activity adjusted per tolerance   up ad lakhwinder  Head of Bed (HOB) Positioning: HOB at 15 degrees     Problem: Hip Fracture Medical Management  Goal: Optimal Coping with Change in Health Status  Outcome: Progressing  Goal: Absence of Bleeding  Outcome: Progressing  Goal: Effective Bowel Elimination  Outcome: Progressing  Goal: Baseline Cognitive Function Maintained  Outcome: Progressing  Goal: Absence of Embolism  Outcome: Progressing  Intervention: Prevent or Manage Embolism Risk  Recent Flowsheet Documentation  Taken 10/23/2024 0050 by Allison Matamoros RN  VTE Prevention/Management: SCDs off (sequential compression devices)  Goal: Fracture Stability  Outcome: Progressing  Goal: Optimal Functional Performance  Outcome: Progressing  Intervention: Promote Optimal Functional Status  Recent Flowsheet Documentation  Taken 10/23/2024 0050 by Allison Matamoros RN  Activity Management:   activity adjusted per tolerance   up ad lakhwinder  Goal: Pain Control and Function  Outcome: Progressing  Intervention: Manage Acute Orthopaedic-Related Pain  Recent Flowsheet Documentation  Taken 10/23/2024 0050 by Allison Matamoros RN  Pain Management Interventions: medication (see MAR)  Goal: Effective Urinary Elimination  Outcome: Progressing

## 2024-10-23 NOTE — PROGRESS NOTES
[facility]    Medicine Progress Note - Hospitalist Service    Date of Admission:  10/21/2024    Assessment & Plan     Guanako Padilla is a 69 year old male with PMH including cognitive impairment with ataxia and dizziness, HLD, gastric ulcer, BPH, SHREYA, and obesity who presents after a fall at home.  He was walking down the stairs on his deck when he slipped and fell backwards landing on his back and left side.  He fell down about 7-8 stairs.  He was reporting left lateral chest pain.  He had a right hand laceration and also cut to the arm and elbow.  He did hit his head, but did not have loss conscious.  He was having some dizziness prior to falling which is not unusual for him.  His daughter says that he had a bad fall about 2 years ago and since then he said some worsening cognitive impairment and persistent dizziness and balance difficulties.  They are following with neurology in the outpatient setting.  He currently has improved pain control following IV opiates that he received.  He does have increased pain with taking a deep breath, but does not feel short of breath.     History obtained from patient, patient's daughter secondary to his cognitive impairment, medical record, and from Dr. Ortiz in the emergency department.  Blood pressure 131/96, heart rate 92, temperature 98.3  F, oxygen 96% on room air.  WBC 5.7, hemoglobin 12.0, platelet count 245.  Sodium is 145, potassium 4.2, chloride 109, bicarb 19, BUN 24, creatinine 1.01, glucose 157.  Troponin T elevated 30 with repeat 27.  EKG shows NSR with no ST elevation or depression.  Right hand x-ray shows osteoarthritis.  CT of the head is unremarkable.  CT chest/ab/pelvis shows mildly displaced lateral left 3rd-8th rib fractures, acute L2 transverse process fracture, left nonobstructive kidney stone, 4.2 cm ascending aortic aneurysm.  He received Zofran, 4 mg IV morphine, and 1 mg IV Dilaudid.  Pain control is improved.  Dr. Ortiz did  speak with thoracic surgery who recommended admission here for pain control and no current indication for plating procedure.     Continuing pain control, PT.  Pending serial X-rays likely home w/ assist on 10/24.    Update: Chest x-ray shows a new left lower lobe infiltrate.  Starting empiric IV Unasyn for suspected pneumonia.  Will continue to monitor.      Fall  Acute traumatic left 3rd-8th rib fractures  Acute traumatic L2 transverse process fracture: He was walking down the stairs on the deck at home and is a little bit dizzy and he is falling landing on his back and left side.  He fell down about 7-8 stairs.  He is reporting left lateral chest pain that is worse with movement and taking a deep breath.  He did hit his head, but did not have loss of consciousness.  CT head unremarkable.  X-ray of the right hand shows osteoarthritis.  CT chest/ab/pelvis shows mildly displaced left lateral 3rd-8th rib fractures and a L2 transverse process fracture.  Contacted general surgery for trauma eval contacted thoracic surgery who recommended admission here for pain control and no current indication for bleeding.  -Consulted general surgery for trauma evaluation  -Consult thoracic surgery, plan is for conservative management and outpatient follow-up.  -Consult anesthesia for nerve block  -Consult neurosurgery for the L2 fracture.  Planning conservative management with weight restriction of 5 to 10 pounds until pain is totally resolved.  Otherwise no plan for bracing or surgical intervention per neurosurgery.  -Consult PT/OT/SW, fall precautions.  Initial rec is home w/ assist.  -Scheduled acetaminophen 975 mg 3 times daily, scheduled gabapentin 100 mg 3 times daily, scheduled methocarbamol 500 mg 3 times daily, place lidocaine patch, give 1 dose IV Toradol now, ibuprofen 400 mg every 6 hours as needed, oxycodone 5 mg for moderate and 10 mg for severe pain every 4 hours as needed, IV Dilaudid 0.2 mg for moderate and 0.4 mg  "for severe acute pain every 2 hours as needed  -Daily chest x-ray ordered x 2  -Incentive spirometer, continuous pulse ox  -scheduled bowel regimen.     Cognitive impairment  Ataxia: He is currently following with neurology as an outpatient for worsening cognitive impairment and ataxia with dizziness with ambulation.  This has been progressive ever since a bad fall couple years ago per his daughter.  -Fall precautions     HLD: Resume PTA atorvastatin 10 mg at bedtime.     Elevated troponin: Troponin T was elevated at 30 repeat downtrending to 27.  EKG shows NSR with no ST elevation or depression.  Chest pain is secondary to his rib fractures.  Has trace-1+ bilateral extremity edema on exam.     -TTE grossly normal.     Ascending thoracic aortic aneurysm: 4.2 cm ascending thoracic aortic aneurysm seen on CT.     History of gastric ulcer: Ordered pantoprazole daily in case he is receiving any as needed ibuprofen.     BPH: Monitor for any retention.  Not currently on Flomax.     SHREYA: Unclear if using CPAP or not, may need to add this while here if not meeting incentive spirometry goals per the rib fracture order sent.     Obesity: BMI 31.        Diet: Combination Diet Regular Diet Adult    DVT Prophylaxis: Pneumatic Compression Devices  Palacios Catheter: Not present  Lines: None     Cardiac Monitoring: None  Code Status: Full Code      Clinically Significant Risk Factors          # Hyperchloremia: Highest Cl = 109 mmol/L in last 2 days, will monitor as appropriate                        # Overweight: Estimated body mass index is 29.45 kg/m  as calculated from the following:    Height as of this encounter: 1.88 m (6' 2\").    Weight as of this encounter: 104.1 kg (229 lb 6.4 oz)., PRESENT ON ADMISSION            Disposition Plan     Medically Ready for Discharge: Anticipated Tomorrow             Cristian Garcia MD  Hospitalist Service  [facility]  Securely message with [employer] (more " info)  Text page via [facility] Paging/Directory   ______________________________________________________________________    Interval History     Feels ok, pain meds working but sore.  No BM for three days, scheduling bowel regimen.  Encouraging deep breathing  Awaiting repeat chest x-ray  Therapies recommending home with assist, likely tomorrow.      Physical Exam   Vital Signs: Temp: 98.3  F (36.8  C) Temp src: Oral BP: 124/77 Pulse: 72   Resp: 16 SpO2: 96 % O2 Device: None (Room air)    Weight: 229 lbs 6.4 oz    General: Alert, awake, no acute distress.  HEENT: NC/AT, eyes anicteric, external occular movements intact, face symmetric.  Cardiac: RRR, S1, S2.  No murmurs appreciated.  Pulmonary: Normal chest rise, normal work of breathing.  Lungs CTA BL  Abdomen: soft, non-tender, non-distended.  Bowel Sounds Present.  No guarding.  Extremities: no deformities.  Warm, well perfused.  Skin: no rashes or lesions noted.  Warm and Dry.  Neuro: No focal deficits noted.  Speech clear.  Coordination and strength grossly normal.  Psych: Appropriate affect.      Medical Decision Making       40 MINUTES SPENT BY ME on the date of service doing chart review, history, exam, documentation & further activities per the note.      Data   Imaging results reviewed over the past 24 hrs:   Recent Results (from the past 24 hours)   Echocardiogram Complete   Result Value    LVEF  55-60%    Narrative    [id number]  [id number]  [id number]  [id number]^CLAIER^ANTONIETTA^TRISTIAN     [facility]  Echocardiography Laboratory  [address] [place] [address]     Name: KAROL SCHAFER  MRN: [id number]  : 1955  Study Date: 10/22/2024 08:04 AM  Age: 69 yrs  Gender: Male  Patient Location: [facility]  Reason For Study: Dizziness  Ordering Physician: ANTONIETTA RANGEL  Referring Physician: Roberto Vega  Performed By: Brooklynn Rowland     BSA: 2.3 m2  Height: 74 in  Weight: 229 lb  HR: 71  BP: 118/79  mmHg  ______________________________________________________________________________  Procedure  Complete Portable Echo Adult. Definity (NDC #98194-918) given intravenously.  Technically difficult study.Extremely difficult acoustic windows despite the  use of contrast for endcardial border definition.  ______________________________________________________________________________  Interpretation Summary     No cardiac cause of dizziness seen in this study. The study was technically  difficult.  ______________________________________________________________________________  Left Ventricle  The left ventricle is normal in size. There is normal left ventricular wall  thickness. The visual ejection fraction is 55-60%. Left ventricular diastolic  function is indeterminate.     Right Ventricle  The right ventricle is normal in structure, function and size.     Atria  The left atrium is mildly dilated. Right atrial size is normal.     Mitral Valve  There is mild mitral annular calcification. The mitral valve leaflets are  mildly thickened. There is trace to mild mitral regurgitation.     Tricuspid Valve  Normal tricuspid valve. There is trace tricuspid regurgitation. Right  ventricular systolic pressure is normal.     Aortic Valve  There is trivial trileaflet aortic sclerosis.     Pulmonic Valve  The pulmonic valve is not well visualized.     Vessels  Aortic root dilatation is present. Ascending aorta dilatation is present.  Dilation of the inferior vena cava is present with abnormal respiratory  variation in diameter.     Pericardium  There is no pericardial effusion.     Rhythm  Sinus rhythm was noted.  ______________________________________________________________________________  MMode/2D Measurements & Calculations  IVSd: 1.6 cm     LVIDd: 5.5 cm  LVIDs: 3.8 cm  LVPWd: 1.4 cm  IVC diam: 2.6 cm  FS: 30.1 %  LV mass(C)d: 369.8 grams  LV mass(C)dI: 160.6 grams/m2  Ao root diam: 4.1 cm  LVOT diam: 2.4 cm  LVOT area: 4.4  cm2  Ao root diam index Ht(cm/m): 2.2  Ao root diam index BSA (cm/m2): 1.8  LA Volume (BP): 84.9 ml  LA Volume Index (BP): 36.9 ml/m2  RV Base: 3.3 cm     RWT: 0.51  TAPSE: 1.7 cm     Doppler Measurements & Calculations  MV E max jose: 82.7 cm/sec  MV A max jose: 45.0 cm/sec  MV E/A: 1.8  MV max PG: 3.0 mmHg  MV mean P.3 mmHg  MV V2 VTI: 20.4 cm  MVA(VTI): 4.0 cm2  MV dec slope: 489.3 cm/sec2  MV dec time: 0.17 sec  Ao V2 max: 112.4 cm/sec  Ao max P.0 mmHg  Ao V2 mean: 88.1 cm/sec  Ao mean PG: 3.3 mmHg  Ao V2 VTI: 24.0 cm  YENY(I,D): 3.4 cm2  YENY(V,D): 3.6 cm2  LV V1 max PG: 3.3 mmHg  LV V1 max: 91.3 cm/sec  LV V1 VTI: 18.4 cm  SV(LVOT): 81.6 ml  SI(LVOT): 35.4 ml/m2  PA V2 max: 72.1 cm/sec  PA max P.1 mmHg  PA acc time: 0.11 sec  AV Jose Ratio (DI): 0.81  YENY Index (cm2/m2): 1.5  E/E' av.3  Lateral E/e': 8.8  Medial E/e': 15.8     RV S Jose: 9.5 cm/sec     ______________________________________________________________________________  Report approved by: Marva Godfrey 10/22/2024 11:02 AM

## 2024-10-23 NOTE — PROGRESS NOTES
"[facility]   General Surgery Progress Note           Assessment and Plan:   Assessment:   Trauma admission, s/p fall down his deck staris.     Acute traumatic injuries by imaging: left rib fractures (non-displaced) 3-8, L2 transverse process fracture.      Plan:   -abx per hospitalist for pneumonia. Encouraged I.S.  -pain control  -bowel regimen: BID senokot, daily Miralax. Prune juice encouraged  -dispo per hospitalist  -general surgery will sign off, please call if questions         Interval History:   Patient comfortable in bed. Family members in room. No new injuries to report today, although it is slightly more painful to get up and walk. Tolerating diet and pain well-controlled with meds. Last BM was 3 days ago, takes miralax at baseline.          Physical Exam:   Blood pressure 125/81, pulse 85, temperature 97.6  F (36.4  C), temperature source Oral, resp. rate 16, height 1.88 m (6' 2\"), weight 104.1 kg (229 lb 6.4 oz), SpO2 96%.    I/O last 3 completed shifts:  In: 360 [P.O.:360]  Out: 625 [Urine:625]    General appearance: well-nourished, no apparent distress  HEENT: Head is normocephalic and atraumatic.   Chest: He is tender to palpation anteriorly on the left chest; there is a lidocaine patch in place here as well.  Abdomen:  Nondistended, soft, nontender to palpation.  No masses.  Back:  TTP in low back.  Extremities: Strength normal and symmetric. Abrasions over his left lateral arm and right hand is bandaged.  Neurologic: nonfocal  Psychiatric: mood and affect are appropriate.             Data:     Recent Labs   Lab 10/22/24  0546 10/21/24  2004   WBC 7.8 5.7   HGB 11.5* 12.1*   HCT 36.9* 38.8*   MCV 92 90    245       Leandra Renae PA-C  Text page: [phone] 8 am to 4 pm  After 4 pm, call [phone]       "

## 2024-10-23 NOTE — PLAN OF CARE
"Pertinent assessments: A&O, forgetful. Afebrile. LS clear. BS x4, feels constipated, bowel meds given. 2+ BLE edema noted. Denies pain at rest, severe pain with mobility, scheduled Tylenol, robaxin, gabapentin, lidocaine patches and PRN 5 mg Oxy given. Multiple bruises/wounds noted, dressings changed.     Major Shift Events: Unasyn stated for concerns on chest xray, ambulation and IS encouraged.     Treatment Plan: Pain management, Encourage use of IS and activity.     Goal Outcome Evaluation:      Plan of Care Reviewed With: patient    Overall Patient Progress: decliningOverall Patient Progress: declining    Outcome Evaluation: anx for possible pna      Problem: Adult Inpatient Plan of Care  Goal: Plan of Care Review  Description: The Plan of Care Review/Shift note should be completed every shift.  The Outcome Evaluation is a brief statement about your assessment that the patient is improving, declining, or no change.  This information will be displayed automatically on your shift  note.  Outcome: Not Progressing  Flowsheets (Taken 10/23/2024 1432)  Outcome Evaluation: anx for possible pna  Plan of Care Reviewed With: patient  Overall Patient Progress: declining  Goal: Patient-Specific Goal (Individualized)  Description: You can add care plan individualizations to a care plan. Examples of Individualization might be:  \"Parent requests to be called daily at 9am for status\", \"I have a hard time hearing out of my right ear\", or \"Do not touch me to wake me up as it startles  me\".  Outcome: Not Progressing  Goal: Absence of Hospital-Acquired Illness or Injury  Outcome: Not Progressing  Intervention: Identify and Manage Fall Risk  Recent Flowsheet Documentation  Taken 10/23/2024 1026 by Jessica Garcia, RN  Safety Promotion/Fall Prevention:   activity supervised   assistive device/personal items within reach   clutter free environment maintained   increased rounding and observation   increase visualization of patient   " patient and family education   nonskid shoes/slippers when out of bed   safety round/check completed   room organization consistent  Intervention: Prevent Skin Injury  Recent Flowsheet Documentation  Taken 10/23/2024 1026 by Jessica Garcia RN  Body Position: log-rolled  Intervention: Prevent Infection  Recent Flowsheet Documentation  Taken 10/23/2024 1026 by Jessica Garcia RN  Infection Prevention:   single patient room provided   visitors restricted/screened   rest/sleep promoted   personal protective equipment utilized   hand hygiene promoted  Goal: Optimal Comfort and Wellbeing  Outcome: Not Progressing  Intervention: Monitor Pain and Promote Comfort  Recent Flowsheet Documentation  Taken 10/23/2024 1216 by Jessica Garcia RN  Pain Management Interventions: medication (see MAR)  Taken 10/23/2024 0840 by Jessica Garcia RN  Pain Management Interventions: medication (see MAR)  Goal: Readiness for Transition of Care  Outcome: Not Progressing     Problem: Pain Acute  Goal: Optimal Pain Control and Function  Outcome: Not Progressing  Intervention: Develop Pain Management Plan  Recent Flowsheet Documentation  Taken 10/23/2024 1216 by Jessica Garcia RN  Pain Management Interventions: medication (see MAR)  Taken 10/23/2024 0840 by Jessica Garcia RN  Pain Management Interventions: medication (see MAR)  Intervention: Prevent or Manage Pain  Recent Flowsheet Documentation  Taken 10/23/2024 1026 by Jessica Garcia RN  Medication Review/Management: medications reviewed     Problem: Skin Injury Risk Increased  Goal: Skin Health and Integrity  Outcome: Not Progressing  Intervention: Optimize Skin Protection  Recent Flowsheet Documentation  Taken 10/23/2024 1216 by Jessica Garcia RN  Activity Management: ambulated to bathroom  Taken 10/23/2024 1026 by Jessica Garcia RN  Activity Management:   activity adjusted per tolerance   activity encouraged  Head of Bed (HOB) Positioning: HOB at 20-30 degrees

## 2024-10-23 NOTE — CONSULTS
Care Management Initial Consult    General Information  Assessment completed with: Patient,    Type of CM/SW Visit: Initial Assessment    Primary Care Provider verified and updated as needed:     Readmission within the last 30 days: no previous admission in last 30 days         Advance Care Planning:       General Information Comments: Lives with his wife indedpendently    Communication Assessment  Patient's communication style: spoken language (English or Bilingual)    Hearing Difficulty or Deaf: no   Wear Glasses or Blind: yes    Cognitive  Cognitive/Neuro/Behavioral: .WDL except  Level of Consciousness: alert  Arousal Level: opens eyes spontaneously  Orientation: oriented x 4  Mood/Behavior: calm, cooperative  Best Language: 0 - No aphasia  Speech: clear, spontaneous, logical    Living Environment:   People in home: spouse  Myranda  Current living Arrangements: house      Able to return to prior arrangements: yes  Living Arrangement Comments: Multi-level home    Family/Social Support:  Care provided by: self  Provides care for: no one  Marital Status:   Support system: Wife, Children  Myranda       Description of Support System: Supportive, Involved    Support Assessment: Adequate family and caregiver support, Adequate social supports    Current Resources:   Patient receiving home care services:          Community Resources:    Equipment currently used at home: shower chair, raised toilet seat, walker, rolling  Supplies currently used at home:      Employment/Financial:  Employment Status: retired        Financial Concerns: none           Does the patient's insurance plan have a 3 day qualifying hospital stay waiver?  No    Lifestyle & Psychosocial Needs:  Social Drivers of Health     Food Insecurity: Low Risk  (10/22/2024)    Food Insecurity     Within the past 12 months, did you worry that your food would run out before you got money to buy more?: No     Within the past 12 months, did the food you bought  just not last and you didn t have money to get more?: No   Depression: Not at risk (4/25/2024)    Received from [facility]    PHQ-2     PHQ-2 TOTAL SCORE: 0   Housing Stability: High Risk (10/22/2024)    Housing Stability     Do you have housing? : No     Are you worried about losing your housing?: No   Tobacco Use: Low Risk  (10/8/2024)    Received from [facility]    Patient History     Smoking Tobacco Use: Never     Smokeless Tobacco Use: Never     Passive Exposure: Never   Financial Resource Strain: Low Risk  (10/22/2024)    Financial Resource Strain     Within the past 12 months, have you or your family members you live with been unable to get utilities (heat, electricity) when it was really needed?: No   Alcohol Use: Not on file   Transportation Needs: Low Risk  (10/22/2024)    Transportation Needs     Within the past 12 months, has lack of transportation kept you from medical appointments, getting your medicines, non-medical meetings or appointments, work, or from getting things that you need?: No   Physical Activity: Not on file   Interpersonal Safety: High Risk (10/22/2024)    Interpersonal Safety     Do you feel physically and emotionally safe where you currently live?: No     Within the past 12 months, have you been hit, slapped, kicked or otherwise physically hurt by someone?: No     Within the past 12 months, have you been humiliated or emotionally abused in other ways by your partner or ex-partner?: No   Stress: Not on file   Social Connections: Socially Integrated (7/24/2024)    Received from [facility]    Social Connections     Frequency of Communication with Friends and Family: 0   Health Literacy: Not on file       Functional Status:  Prior to admission patient needed assistance:   Dependent ADLs:: Independent  Dependent IADLs:: Independent       Mental Health Status:  Mental Health Status: No  Current Concerns       Chemical Dependency Status:  Chemical Dependency Status: No Current Concerns             Values/Beliefs:  Spiritual, Cultural Beliefs, Judaism Practices, Values that affect care:                 Discussed  Partnership in Safe Discharge Planning  document with patient/family: No    Additional Information:  Patient comes from home with his wife. He is alert and oriented x3, able to communicate his needs and thoughts. Can become forgetful which he is aware. He laughs it off. Plan is for patient to return home with his wife no services needed.     Next Steps:  no SW needs but available if needed.    NORA Marte   Inpatient Care Coordination   Supervisor  [facility]  [phone]      NORA Franklin

## 2024-10-23 NOTE — PLAN OF CARE
Goal Outcome Evaluation:      Plan of Care Reviewed With: patient          Outcome Evaluation: Return home with wife, no SW needs

## 2024-10-24 ENCOUNTER — APPOINTMENT (OUTPATIENT)
Dept: PHYSICAL THERAPY | Facility: CLINIC | Age: 69
DRG: 184 | End: 2024-10-24
Payer: COMMERCIAL

## 2024-10-24 ENCOUNTER — APPOINTMENT (OUTPATIENT)
Dept: GENERAL RADIOLOGY | Facility: CLINIC | Age: 69
DRG: 184 | End: 2024-10-24
Attending: INTERNAL MEDICINE
Payer: COMMERCIAL

## 2024-10-24 PROCEDURE — 94799 UNLISTED PULMONARY SVC/PX: CPT

## 2024-10-24 PROCEDURE — 99232 SBSQ HOSP IP/OBS MODERATE 35: CPT | Performed by: INTERNAL MEDICINE

## 2024-10-24 PROCEDURE — 97116 GAIT TRAINING THERAPY: CPT | Mod: GP | Performed by: PHYSICAL THERAPIST

## 2024-10-24 PROCEDURE — 250N000013 HC RX MED GY IP 250 OP 250 PS 637: Performed by: INTERNAL MEDICINE

## 2024-10-24 PROCEDURE — 94150 VITAL CAPACITY TEST: CPT

## 2024-10-24 PROCEDURE — 97530 THERAPEUTIC ACTIVITIES: CPT | Mod: GP | Performed by: PHYSICAL THERAPIST

## 2024-10-24 PROCEDURE — 250N000011 HC RX IP 250 OP 636: Performed by: INTERNAL MEDICINE

## 2024-10-24 PROCEDURE — 120N000001 HC R&B MED SURG/OB

## 2024-10-24 PROCEDURE — 71045 X-RAY EXAM CHEST 1 VIEW: CPT

## 2024-10-24 PROCEDURE — 272N000202 HC AEROBIKA WITH MANOMETER

## 2024-10-24 PROCEDURE — 250N000013 HC RX MED GY IP 250 OP 250 PS 637: Performed by: PHYSICIAN ASSISTANT

## 2024-10-24 PROCEDURE — 999N000157 HC STATISTIC RCP TIME EA 10 MIN

## 2024-10-24 RX ADMIN — AMPICILLIN SODIUM AND SULBACTAM SODIUM 3 G: 2; 1 INJECTION, POWDER, FOR SOLUTION INTRAMUSCULAR; INTRAVENOUS at 16:43

## 2024-10-24 RX ADMIN — GABAPENTIN 300 MG: 300 CAPSULE ORAL at 08:27

## 2024-10-24 RX ADMIN — SENNOSIDES AND DOCUSATE SODIUM 2 TABLET: 50; 8.6 TABLET ORAL at 20:43

## 2024-10-24 RX ADMIN — AMPICILLIN SODIUM AND SULBACTAM SODIUM 3 G: 2; 1 INJECTION, POWDER, FOR SOLUTION INTRAMUSCULAR; INTRAVENOUS at 22:58

## 2024-10-24 RX ADMIN — AMPICILLIN SODIUM AND SULBACTAM SODIUM 3 G: 2; 1 INJECTION, POWDER, FOR SOLUTION INTRAMUSCULAR; INTRAVENOUS at 11:55

## 2024-10-24 RX ADMIN — OXYCODONE HYDROCHLORIDE 5 MG: 5 TABLET ORAL at 08:27

## 2024-10-24 RX ADMIN — GABAPENTIN 300 MG: 300 CAPSULE ORAL at 21:23

## 2024-10-24 RX ADMIN — ACETAMINOPHEN 325MG 975 MG: 325 TABLET ORAL at 01:45

## 2024-10-24 RX ADMIN — METHOCARBAMOL 500 MG: 500 TABLET ORAL at 14:02

## 2024-10-24 RX ADMIN — ACETAMINOPHEN 325MG 975 MG: 325 TABLET ORAL at 16:43

## 2024-10-24 RX ADMIN — LIDOCAINE 1 PATCH: 4 PATCH TOPICAL at 08:28

## 2024-10-24 RX ADMIN — SENNOSIDES AND DOCUSATE SODIUM 2 TABLET: 50; 8.6 TABLET ORAL at 08:25

## 2024-10-24 RX ADMIN — ACETAMINOPHEN 325MG 975 MG: 325 TABLET ORAL at 08:24

## 2024-10-24 RX ADMIN — POLYETHYLENE GLYCOL 3350 17 G: 17 POWDER, FOR SOLUTION ORAL at 20:42

## 2024-10-24 RX ADMIN — METHOCARBAMOL 500 MG: 500 TABLET ORAL at 08:27

## 2024-10-24 RX ADMIN — OXYCODONE HYDROCHLORIDE 5 MG: 5 TABLET ORAL at 21:23

## 2024-10-24 RX ADMIN — BACITRACIN ZINC AND POLYMYXIN B SULFATE: 500; 10000 OINTMENT TOPICAL at 11:58

## 2024-10-24 RX ADMIN — BACITRACIN ZINC AND POLYMYXIN B SULFATE: 500; 10000 OINTMENT TOPICAL at 20:46

## 2024-10-24 RX ADMIN — ATORVASTATIN CALCIUM 10 MG: 10 TABLET, FILM COATED ORAL at 21:23

## 2024-10-24 RX ADMIN — OXYCODONE HYDROCHLORIDE 5 MG: 5 TABLET ORAL at 13:02

## 2024-10-24 RX ADMIN — OXYCODONE HYDROCHLORIDE 5 MG: 5 TABLET ORAL at 02:48

## 2024-10-24 RX ADMIN — GABAPENTIN 300 MG: 300 CAPSULE ORAL at 16:43

## 2024-10-24 RX ADMIN — DOCUSATE SODIUM 100 MG: 100 CAPSULE, LIQUID FILLED ORAL at 08:26

## 2024-10-24 RX ADMIN — AMPICILLIN SODIUM AND SULBACTAM SODIUM 3 G: 2; 1 INJECTION, POWDER, FOR SOLUTION INTRAMUSCULAR; INTRAVENOUS at 05:34

## 2024-10-24 RX ADMIN — POLYETHYLENE GLYCOL 3350 17 G: 17 POWDER, FOR SOLUTION ORAL at 08:27

## 2024-10-24 RX ADMIN — PANTOPRAZOLE SODIUM 40 MG: 40 TABLET, DELAYED RELEASE ORAL at 06:17

## 2024-10-24 RX ADMIN — DOCUSATE SODIUM 100 MG: 100 CAPSULE, LIQUID FILLED ORAL at 20:42

## 2024-10-24 NOTE — PROGRESS NOTES
Pt completed NIF x 2 at -35 and -40 with good effort. Educated on flutter valve use and is independent Q2h.    Cynthia Tobias, RT on 10/24/2024 at 5:15 PM

## 2024-10-24 NOTE — PLAN OF CARE
"Assessments:   A&Ox3, forgetful. Up Ax1 with walker. Trace edema on BLE edema noted. Denies pain at rest, moderate-severe pain with mobility, PRN oxycodone given and scheduled Tylenol, lidocaine patches in place over L  ribs. and PRN 5 mg Oxy given. Multiple bruises/wounds noted on upper extremities, dressings in place.     Treatment Plan: Pain management, Encourage use of IS, Unasyn, PT to assess tomorrow with wife (wife informed)    Bedside Nurse: Raoul Holbrook RN        Problem: Adult Inpatient Plan of Care  Goal: Plan of Care Review  Description: The Plan of Care Review/Shift note should be completed every shift.  The Outcome Evaluation is a brief statement about your assessment that the patient is improving, declining, or no change.  This information will be displayed automatically on your shift  note.  Outcome: Not Progressing  Flowsheets (Taken 10/23/2024 1904)  Outcome Evaluation: no SOB  Plan of Care Reviewed With:   patient   spouse  Overall Patient Progress: improving  Goal: Patient-Specific Goal (Individualized)  Description: You can add care plan individualizations to a care plan. Examples of Individualization might be:  \"Parent requests to be called daily at 9am for status\", \"I have a hard time hearing out of my right ear\", or \"Do not touch me to wake me up as it startles  me\".  Outcome: Not Progressing  Goal: Absence of Hospital-Acquired Illness or Injury  Outcome: Not Progressing  Goal: Optimal Comfort and Wellbeing  Outcome: Not Progressing  Goal: Readiness for Transition of Care  Outcome: Not Progressing     Problem: Pain Acute  Goal: Optimal Pain Control and Function  Outcome: Not Progressing     Problem: Skin Injury Risk Increased  Goal: Skin Health and Integrity  Outcome: Not Progressing     Problem: Hip Fracture Medical Management  Goal: Optimal Coping with Change in Health Status  Outcome: Not Progressing  Goal: Absence of Bleeding  Outcome: Not Progressing  Goal: Effective Bowel " Elimination  Outcome: Not Progressing  Goal: Baseline Cognitive Function Maintained  Outcome: Not Progressing  Goal: Absence of Embolism  Outcome: Not Progressing  Goal: Fracture Stability  Outcome: Not Progressing  Goal: Optimal Functional Performance  Outcome: Not Progressing  Goal: Pain Control and Function  Outcome: Not Progressing  Goal: Effective Urinary Elimination  Outcome: Not Progressing     Goal Outcome Evaluation:      Plan of Care Reviewed With: patient, spouse    Overall Patient Progress: improving    Outcome Evaluation: no SOB

## 2024-10-24 NOTE — PLAN OF CARE
"Goal Outcome Evaluation:      Plan of Care Reviewed With: patient    Overall Patient Progress: improvingOverall Patient Progress: improving    Vitals are Temp: 98.7  F (37.1  C) Temp src: Oral BP: 120/75 Pulse: 81   Resp: 18 SpO2: 95 %.  Patient is Alert and Oriented x4. They are 1 Assist with Walker.  Pt is a Regular diet.  They are complaining of 6/10 pain in their back,rib.  Tylenol and Oxycodone given for pain.  Medicatons decreased pain.  Patient is Saline locked. ABX given. Pt does his IS between 1081-2926. When charting from his before IS seems that he was doing 1750 and overnight it has decreased down to [id number]. VSS otherwise. Denies sob, chest pain.   Paged xcover for the BPA decrease that was triggered. See the note from it.     Problem: Adult Inpatient Plan of Care  Goal: Plan of Care Review  Description: The Plan of Care Review/Shift note should be completed every shift.  The Outcome Evaluation is a brief statement about your assessment that the patient is improving, declining, or no change.  This information will be displayed automatically on your shift  note.  Outcome: Progressing  Flowsheets (Taken 10/24/2024 0402)  Plan of Care Reviewed With: patient  Overall Patient Progress: improving  Goal: Patient-Specific Goal (Individualized)  Description: You can add care plan individualizations to a care plan. Examples of Individualization might be:  \"Parent requests to be called daily at 9am for status\", \"I have a hard time hearing out of my right ear\", or \"Do not touch me to wake me up as it startles  me\".  Outcome: Progressing  Goal: Absence of Hospital-Acquired Illness or Injury  Outcome: Progressing  Goal: Optimal Comfort and Wellbeing  Outcome: Progressing  Goal: Readiness for Transition of Care  Outcome: Progressing     Problem: Skin Injury Risk Increased  Goal: Skin Health and Integrity  Outcome: Progressing  Intervention: Plan: Nurse Driven Intervention: Moisture Management  Recent Flowsheet " Documentation  Taken 10/23/2024 2000 by Nery Valentin, RN  Moisture Interventions: Encourage regular toileting  Bathing/Skin Care:   dressed/undressed   shower     Problem: Pain Acute  Goal: Optimal Pain Control and Function  Outcome: Progressing     Problem: Hip Fracture Medical Management  Goal: Optimal Coping with Change in Health Status  Outcome: Progressing  Goal: Absence of Bleeding  Outcome: Progressing  Goal: Effective Bowel Elimination  Outcome: Progressing  Goal: Baseline Cognitive Function Maintained  Outcome: Progressing  Goal: Absence of Embolism  Outcome: Progressing  Goal: Fracture Stability  Outcome: Progressing  Goal: Optimal Functional Performance  Outcome: Progressing  Goal: Pain Control and Function  Outcome: Progressing  Goal: Effective Urinary Elimination  Outcome: Progressing

## 2024-10-24 NOTE — PROGRESS NOTES
[facility]    Medicine Progress Note - Hospitalist Service    Date of Admission:  10/21/2024    Assessment & Plan     Guanako Padilla is a 69 year old male with PMH including cognitive impairment with ataxia and dizziness, HLD, gastric ulcer, BPH, SHREYA, and obesity who presents after a fall at home.  He was walking down the stairs on his deck when he slipped and fell backwards landing on his back and left side.  He fell down about 7-8 stairs.  He was reporting left lateral chest pain.  He had a right hand laceration and also cut to the arm and elbow.  He did hit his head, but did not have loss conscious.  He was having some dizziness prior to falling which is not unusual for him.  His daughter says that he had a bad fall about 2 years ago and since then he said some worsening cognitive impairment and persistent dizziness and balance difficulties.  They are following with neurology in the outpatient setting.  He currently has improved pain control following IV opiates that he received.  He does have increased pain with taking a deep breath, but does not feel short of breath.     History obtained from patient, patient's daughter secondary to his cognitive impairment, medical record, and from Dr. Ortiz in the emergency department.  Blood pressure 131/96, heart rate 92, temperature 98.3  F, oxygen 96% on room air.  WBC 5.7, hemoglobin 12.0, platelet count 245.  Sodium is 145, potassium 4.2, chloride 109, bicarb 19, BUN 24, creatinine 1.01, glucose 157.  Troponin T elevated 30 with repeat 27.  EKG shows NSR with no ST elevation or depression.  Right hand x-ray shows osteoarthritis.  CT of the head is unremarkable.  CT chest/ab/pelvis shows mildly displaced lateral left 3rd-8th rib fractures, acute L2 transverse process fracture, left nonobstructive kidney stone, 4.2 cm ascending aortic aneurysm.  He received Zofran, 4 mg IV morphine, and 1 mg IV Dilaudid.  Pain control is improved.  Dr. Ortiz did  speak with thoracic surgery who recommended admission here for pain control and no current indication for plating procedure.     Continuing pain control, PT.     Today's update/plan:     Chest x-ray 10/23 shows a new left lower lobe infiltrate.  Started empiric IV Unasyn for suspected pneumonia, though admittedly this could be atelectasis.  Will continue to monitor.  Still fairly slow to mobilize, PT to reevaluate this afternoon.  Hopefully can discharge home in the next 24 to 48 hours with family support, antibiotics and pain control.  Still has not had a bowel movement so treating constipation as well    Second update: cxr today w/ more conspicuous LLL consolidation, ?fluid.  Repeating tomorrow AM.  Might need to consider thoracentesis if he has more evidence of effusion as this could be bloody in nature.    Fall  Acute traumatic left 3rd-8th rib fractures  Acute traumatic L2 transverse process fracture: He was walking down the stairs on the deck at home and is a little bit dizzy and he is falling landing on his back and left side.  He fell down about 7-8 stairs.  He is reporting left lateral chest pain that is worse with movement and taking a deep breath.  He did hit his head, but did not have loss of consciousness.  CT head unremarkable.  X-ray of the right hand shows osteoarthritis.  CT chest/ab/pelvis shows mildly displaced left lateral 3rd-8th rib fractures and a L2 transverse process fracture.  Contacted general surgery for trauma eval contacted thoracic surgery who recommended admission here for pain control and no current indication for bleeding.  -Consulted general surgery for trauma evaluation  -Consult thoracic surgery, plan is for conservative management and outpatient follow-up.  -Consult anesthesia for nerve block  -Consult neurosurgery for the L2 fracture.  Planning conservative management with weight restriction of 5 to 10 pounds until pain is totally resolved.  Otherwise no plan for bracing or  surgical intervention per neurosurgery.  -Consult PT/OT/SW, fall precautions.  Initial rec is home w/ assist.  -Scheduled acetaminophen 975 mg 3 times daily, scheduled gabapentin 100 mg 3 times daily, scheduled methocarbamol 500 mg 3 times daily, place lidocaine patch, give 1 dose IV Toradol now, ibuprofen 400 mg every 6 hours as needed, oxycodone 5 mg for moderate and 10 mg for severe pain every 4 hours as needed, IV Dilaudid 0.2 mg for moderate and 0.4 mg for severe acute pain every 2 hours as needed  -Incentive spirometer, continuous pulse ox  -scheduled bowel regimen.    Possible left lower lobe pneumonia: Unclear if this might be aspiration or secondary to rib fractures.  Has not had fever or elevated white blood count but it seems there is enough here to treat with a course of antibiotics.  --Started IV Unasyn on 10/23  --Anticipate discharging with oral Augmentin     Cognitive impairment  Ataxia: He is currently following with neurology as an outpatient for worsening cognitive impairment and ataxia with dizziness with ambulation.  This has been progressive ever since a bad fall couple years ago per his daughter.  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  -Fall precautions     HLD: Resume PTA atorvastatin 10 mg at bedtime.     Elevated troponin: Troponin T was elevated at 30 repeat downtrending to 27.  EKG shows NSR with no ST elevation or depression.  Chest pain is secondary to his rib fractures.  Has trace-1+ bilateral extremity edema on exam.     -TTE grossly normal.     Ascending thoracic aortic aneurysm: 4.2 cm ascending thoracic aortic aneurysm seen on CT.     History of gastric ulcer: Ordered pantoprazole daily in case he is receiving any as needed ibuprofen.     BPH: Monitor for any retention.  Not currently on Flomax.     SHREYA: Unclear if using CPAP or not, may need to add this while here if not meeting incentive spirometry goals  "per the rib fracture order sent.     Obesity: BMI 31.        Diet: Combination Diet Regular Diet Adult    DVT Prophylaxis: Pneumatic Compression Devices  Pal[place]ios Catheter: Not present  Lines: None     Cardiac Monitoring: None  Code Status: Full Code      Clinically Significant Risk Factors                             # Overweight: Estimated body mass index is 29.45 kg/m  as calculated from the following:    Height as of this encounter: 1.88 m (6' 2\").    Weight as of this encounter: 104.1 kg (229 lb 6.4 oz)., PRESENT ON ADMISSION     # Financial/Environmental Concerns: none         Disposition Plan     Medically Ready for Discharge: Anticipated Tomorrow pending mobility, pain control, family's ability to provide the support he needs.             Cristian Garcia MD  Hospitalist Service  [facility]  Securely message with [employer] (more info)  Text page via [employer] Paging/Directory   ______________________________________________________________________    Interval History     Feels ok, pain meds working but sore.  No BM continuing bowel regimen.  Encouraging deep breathing  Chest x-ray with left lower lobe infiltrate, started Unasyn yesterday  Fairly slow to mobilize, at least 1 day more here for therapies and pain control      Physical Exam   Vital Signs: Temp: 98.4  F (36.9  C) Temp src: Oral BP: 127/77 Pulse: 83   Resp: 18 SpO2: 97 % O2 Device: None (Room air)    Weight: 229 lbs 6.4 oz    General: Alert, awake, no acute distress.  HEENT: NC/AT, eyes anicteric, external occular movements intact, face symmetric.  Cardiac: RRR, S1, S2.  No murmurs appreciated.  Pulmonary: Normal chest rise, normal work of breathing.  Lungs CTA BL  Abdomen: soft, non-tender, non-distended.  Bowel Sounds Present.  No guarding.  Extremities: no deformities.  Warm, well perfused.  Skin: no rashes or lesions noted.  Warm and Dry.  Neuro: No focal deficits noted.  Speech clear.  Coordination and strength grossly " normal.  Psych: Appropriate affect.      Medical Decision Making       40 MINUTES SPENT BY ME on the date of service doing chart review, history, exam, documentation & further activities per the note.      Data   Imaging results reviewed over the past 24 hrs:   Recent Results (from the past 24 hours)   XR Chest Port 1 View    Narrative    CHEST ONE VIEW  10/24/2024 8:29 AM     HISTORY: Trauma Patient with Rib Fracture(s)    COMPARISON: 10/23/2024      Impression    IMPRESSION: Enlarged cardiomediastinal silhouette. Left retrocardiac  opacity appears more conspicuous and may reflect combination of  pleural fluid, pneumonia and atelectasis. Multiple left-sided rib  fractures are again seen.    TAMMY LAW MD         SYSTEM ID:  [id number]

## 2024-10-24 NOTE — PROGRESS NOTES
Cross Cover    Called for triggering BPA for rib fx and drop in IS results  IS was able to do 1750 but now in the 100-1200 range.  Pain rated as 4/10 so given APAP   Cont to treat pain, encourage IS

## 2024-10-24 NOTE — PLAN OF CARE
"Assessments:   A&Ox3, forgetful. Trace edema noted to the BLE. Intermittent c/o pain, treated with oral pain medications. PRN oxycodone given last at 1300 with good effect. Scattered skin tears to BUE persist, on tx dressings changed.       Treatment Plan: Pain management, Encourage use of IS, Unasyn, PT to assess tomorrow with wife (wife informed)    Bedside Nurse: Sofia Youngblood RN    Goal Outcome Evaluation:      Plan of Care Reviewed With: patient, family    Overall Patient Progress: improvingOverall Patient Progress: improving    Outcome Evaluation: No SOB noted. Pain managed adequately with PO pain medications.      Problem: Adult Inpatient Plan of Care  Goal: Plan of Care Review  Description: The Plan of Care Review/Shift note should be completed every shift.  The Outcome Evaluation is a brief statement about your assessment that the patient is improving, declining, or no change.  This information will be displayed automatically on your shift  note.  10/24/2024 1807 by Sofia Youngblood RN  Outcome: Progressing  Flowsheets (Taken 10/24/2024 1807)  Outcome Evaluation: No SOB noted. Pain managed adequately with PO pain medications.  Plan of Care Reviewed With:   patient   family  Overall Patient Progress: improving  10/24/2024 1806 by Sofia Youngblood RN  Outcome: Progressing  Goal: Patient-Specific Goal (Individualized)  Description: You can add care plan individualizations to a care plan. Examples of Individualization might be:  \"Parent requests to be called daily at 9am for status\", \"I have a hard time hearing out of my right ear\", or \"Do not touch me to wake me up as it startles  me\".  10/24/2024 1807 by Sofia Youngblood RN  Outcome: Progressing  10/24/2024 1806 by Sofia Youngblood RN  Outcome: Progressing  Goal: Absence of Hospital-Acquired Illness or Injury  10/24/2024 1807 by Sofia Youngblood RN  Outcome: Progressing  10/24/2024 1806 by Sofia Youngblood RN  Outcome: Progressing  Intervention: Identify " and Manage Fall Risk  Recent Flowsheet Documentation  Taken 10/24/2024 0827 by Sofia Youngblood RN  Safety Promotion/Fall Prevention:   activity supervised   assistive device/personal items within reach   clutter free environment maintained   safety round/check completed  Intervention: Prevent Skin Injury  Recent Flowsheet Documentation  Taken 10/24/2024 0827 by Sofia Youngblood RN  Body Position:   position changed independently   supine  Intervention: Prevent and Manage VTE (Venous Thromboembolism) Risk  Recent Flowsheet Documentation  Taken 10/24/2024 0827 by Sofia Youngblood RN  VTE Prevention/Management: SCDs off (sequential compression devices)  Intervention: Prevent Infection  Recent Flowsheet Documentation  Taken 10/24/2024 1803 by Sofia Youngblood RN  Infection Prevention:   single patient room provided   hand hygiene promoted  Taken 10/24/2024 0827 by Sofia Youngblood RN  Infection Prevention:   hand hygiene promoted   rest/sleep promoted   single patient room provided  Goal: Optimal Comfort and Wellbeing  10/24/2024 1807 by Sofia Youngblood RN  Outcome: Progressing  10/24/2024 1806 by Sofia Youngblood RN  Outcome: Progressing  Intervention: Monitor Pain and Promote Comfort  Recent Flowsheet Documentation  Taken 10/24/2024 0827 by Sofia Youngblood RN  Pain Management Interventions: medication (see MAR)  Goal: Readiness for Transition of Care  10/24/2024 1807 by Sofia Youngblood RN  Outcome: Progressing  10/24/2024 1806 by Sofia Youngblood RN  Outcome: Progressing     Problem: Pain Acute  Goal: Optimal Pain Control and Function  10/24/2024 1807 by Sofia Youngblood RN  Outcome: Progressing  10/24/2024 1806 by Sofia Youngblood RN  Outcome: Progressing  Intervention: Develop Pain Management Plan  Recent Flowsheet Documentation  Taken 10/24/2024 0827 by Sofia Youngblood RN  Pain Management Interventions: medication (see MAR)  Intervention: Prevent or Manage Pain  Recent Flowsheet Documentation  Taken 10/24/2024  0827 by Sofia Youngblood RN  Medication Review/Management: medications reviewed  Intervention: Optimize Psychosocial Wellbeing  Recent Flowsheet Documentation  Taken 10/24/2024 0827 by Sofia Youngblood RN  Supportive Measures:   self-care encouraged   active listening utilized     Problem: Skin Injury Risk Increased  Goal: Skin Health and Integrity  10/24/2024 1807 by Sofia Youngblood RN  Outcome: Progressing  10/24/2024 1806 by Sofia Youngblood RN  Outcome: Progressing  Intervention: Optimize Skin Protection  Recent Flowsheet Documentation  Taken 10/24/2024 0827 by Sofia Youngblood RN  Pressure Reduction Techniques: frequent weight shift encouraged  Activity Management:   activity adjusted per tolerance   ambulated in room   ambulated to bathroom   up in chair     Problem: Hip Fracture Medical Management  Goal: Optimal Coping with Change in Health Status  10/24/2024 1807 by Sofia Youngblood RN  Outcome: Progressing  10/24/2024 1806 by Sofia Youngblood RN  Outcome: Progressing  Intervention: Support Psychosocial Response to Injury  Recent Flowsheet Documentation  Taken 10/24/2024 0827 by Sofia Youngblood RN  Supportive Measures:   self-care encouraged   active listening utilized  Goal: Absence of Bleeding  10/24/2024 1807 by Sofia Youngblood RN  Outcome: Progressing  10/24/2024 1806 by Sofia Youngblood RN  Outcome: Progressing  Goal: Effective Bowel Elimination  10/24/2024 1807 by Sofia Youngblood RN  Outcome: Progressing  10/24/2024 1806 by Sofia Youngblood RN  Outcome: Progressing  Goal: Baseline Cognitive Function Maintained  10/24/2024 1807 by Sofia Youngblood RN  Outcome: Progressing  10/24/2024 1806 by Sofia Youngblood RN  Outcome: Progressing  Goal: Absence of Embolism  10/24/2024 1807 by Sofia Youngblood RN  Outcome: Progressing  10/24/2024 1806 by Sofia Youngblood RN  Outcome: Progressing  Intervention: Prevent or Manage Embolism Risk  Recent Flowsheet Documentation  Taken 10/24/2024 0827 by Sofia Youngblood  A, RN  VTE Prevention/Management: SCDs off (sequential compression devices)  Goal: Fracture Stability  10/24/2024 1807 by Sofia Youngblood RN  Outcome: Progressing  10/24/2024 1806 by Sofia Youngblood RN  Outcome: Progressing  Goal: Optimal Functional Performance  10/24/2024 1807 by Sofia Youngblood RN  Outcome: Progressing  10/24/2024 1806 by Sofia Youngblood RN  Outcome: Progressing  Intervention: Promote Optimal Functional Status  Recent Flowsheet Documentation  Taken 10/24/2024 0827 by Sofia Youngblood RN  Range of Motion: active ROM (range of motion) encouraged  Activity Management:   activity adjusted per tolerance   ambulated in room   ambulated to bathroom   up in chair  Goal: Pain Control and Function  10/24/2024 1807 by Sofia Youngblood RN  Outcome: Progressing  10/24/2024 1806 by Sofia Youngblood RN  Outcome: Progressing  Intervention: Manage Acute Orthopaedic-Related Pain  Recent Flowsheet Documentation  Taken 10/24/2024 0827 by Sofia Youngblood RN  Pain Management Interventions: medication (see MAR)  Goal: Effective Urinary Elimination  10/24/2024 1807 by Sofia Youngblood RN  Outcome: Progressing  10/24/2024 1806 by Sofia Youngblood RN  Outcome: Progressing

## 2024-10-25 ENCOUNTER — APPOINTMENT (OUTPATIENT)
Dept: PHYSICAL THERAPY | Facility: CLINIC | Age: 69
DRG: 184 | End: 2024-10-25
Payer: COMMERCIAL

## 2024-10-25 ENCOUNTER — APPOINTMENT (OUTPATIENT)
Dept: GENERAL RADIOLOGY | Facility: CLINIC | Age: 69
DRG: 184 | End: 2024-10-25
Attending: INTERNAL MEDICINE
Payer: COMMERCIAL

## 2024-10-25 PROCEDURE — 97116 GAIT TRAINING THERAPY: CPT | Mod: GP | Performed by: PHYSICAL THERAPIST

## 2024-10-25 PROCEDURE — 71046 X-RAY EXAM CHEST 2 VIEWS: CPT

## 2024-10-25 PROCEDURE — 120N000001 HC R&B MED SURG/OB

## 2024-10-25 PROCEDURE — 250N000013 HC RX MED GY IP 250 OP 250 PS 637: Performed by: PHYSICIAN ASSISTANT

## 2024-10-25 PROCEDURE — 97530 THERAPEUTIC ACTIVITIES: CPT | Mod: GP | Performed by: PHYSICAL THERAPIST

## 2024-10-25 PROCEDURE — 250N000011 HC RX IP 250 OP 636: Performed by: INTERNAL MEDICINE

## 2024-10-25 PROCEDURE — 99232 SBSQ HOSP IP/OBS MODERATE 35: CPT | Performed by: STUDENT IN AN ORGANIZED HEALTH CARE EDUCATION/TRAINING PROGRAM

## 2024-10-25 PROCEDURE — 250N000013 HC RX MED GY IP 250 OP 250 PS 637: Performed by: INTERNAL MEDICINE

## 2024-10-25 RX ORDER — NALOXONE HYDROCHLORIDE 0.4 MG/ML
0.4 INJECTION, SOLUTION INTRAMUSCULAR; INTRAVENOUS; SUBCUTANEOUS
Status: DISCONTINUED | OUTPATIENT
Start: 2024-10-25 | End: 2024-10-26 | Stop reason: HOSPADM

## 2024-10-25 RX ORDER — NALOXONE HYDROCHLORIDE 0.4 MG/ML
0.2 INJECTION, SOLUTION INTRAMUSCULAR; INTRAVENOUS; SUBCUTANEOUS
Status: DISCONTINUED | OUTPATIENT
Start: 2024-10-25 | End: 2024-10-26 | Stop reason: HOSPADM

## 2024-10-25 RX ADMIN — AMPICILLIN SODIUM AND SULBACTAM SODIUM 3 G: 2; 1 INJECTION, POWDER, FOR SOLUTION INTRAMUSCULAR; INTRAVENOUS at 16:52

## 2024-10-25 RX ADMIN — ACETAMINOPHEN 325MG 975 MG: 325 TABLET ORAL at 01:15

## 2024-10-25 RX ADMIN — AMPICILLIN SODIUM AND SULBACTAM SODIUM 3 G: 2; 1 INJECTION, POWDER, FOR SOLUTION INTRAMUSCULAR; INTRAVENOUS at 05:19

## 2024-10-25 RX ADMIN — BACITRACIN ZINC AND POLYMYXIN B SULFATE: 500; 10000 OINTMENT TOPICAL at 10:55

## 2024-10-25 RX ADMIN — AMPICILLIN SODIUM AND SULBACTAM SODIUM 3 G: 2; 1 INJECTION, POWDER, FOR SOLUTION INTRAMUSCULAR; INTRAVENOUS at 23:28

## 2024-10-25 RX ADMIN — GABAPENTIN 300 MG: 300 CAPSULE ORAL at 16:53

## 2024-10-25 RX ADMIN — BACITRACIN ZINC AND POLYMYXIN B SULFATE: 500; 10000 OINTMENT TOPICAL at 21:34

## 2024-10-25 RX ADMIN — AMPICILLIN SODIUM AND SULBACTAM SODIUM 3 G: 2; 1 INJECTION, POWDER, FOR SOLUTION INTRAMUSCULAR; INTRAVENOUS at 10:47

## 2024-10-25 RX ADMIN — GABAPENTIN 300 MG: 300 CAPSULE ORAL at 09:38

## 2024-10-25 RX ADMIN — LIDOCAINE 1 PATCH: 4 PATCH TOPICAL at 09:38

## 2024-10-25 RX ADMIN — PANTOPRAZOLE SODIUM 40 MG: 40 TABLET, DELAYED RELEASE ORAL at 06:01

## 2024-10-25 RX ADMIN — ATORVASTATIN CALCIUM 10 MG: 10 TABLET, FILM COATED ORAL at 21:27

## 2024-10-25 RX ADMIN — POLYETHYLENE GLYCOL 3350 17 G: 17 POWDER, FOR SOLUTION ORAL at 09:39

## 2024-10-25 RX ADMIN — ACETAMINOPHEN 325MG 975 MG: 325 TABLET ORAL at 09:37

## 2024-10-25 RX ADMIN — SENNOSIDES AND DOCUSATE SODIUM 2 TABLET: 50; 8.6 TABLET ORAL at 09:38

## 2024-10-25 RX ADMIN — DOCUSATE SODIUM 100 MG: 100 CAPSULE, LIQUID FILLED ORAL at 09:38

## 2024-10-25 RX ADMIN — ACETAMINOPHEN 325MG 975 MG: 325 TABLET ORAL at 16:52

## 2024-10-25 RX ADMIN — IBUPROFEN 400 MG: 400 TABLET, FILM COATED ORAL at 21:27

## 2024-10-25 RX ADMIN — GABAPENTIN 300 MG: 300 CAPSULE ORAL at 21:26

## 2024-10-25 NOTE — UTILIZATION REVIEW
Admission Status; Secondary Review Determination    Under the authority of the Utilization Management Committee, the utilization review process indicated a secondary review on the above patient. The review outcome is based on review of the medical records, discussions with staff, and applying clinical experience noted on the date of the review.    (x) Inpatient Status Appropriate - This patient's medical care is consistent with medical management for inpatient care and reasonable inpatient medical practice.    RATIONALE FOR DETERMINATION: Reason for review note: Insurance denial.  Complex 69-year-old male with underlying dementia, ataxia and dizziness who after falling fractured 6 ribs and now complicated by an acute new left lower lobe pneumonia. Patient increased risk of acute decline due to the severity of the multiple fractures and concern for new underlying pneumonia. Inpatient care appropriate.     At the time of admission with the information available to the attending physician more than 2 nights Hospital complex care was anticipated, based on patient risk of adverse outcome if treated as outpatient and complex care required. Inpatient admission is appropriate based on the Medicare guidelines.    This document was produced using voice recognition software    The information on this document is developed by the utilization review team in order for the business office to ensure compliance. This only denotes the appropriateness of proper admission status and does not reflect the quality of care rendered.    The definitions of Inpatient Status and Observation Status used in making the determination above are those provided in the CMS Coverage Manual, Chapter 1 and Chapter 6, section 70.4.    Sincerely,    Edenilson Herndon MD  Utilization Review  Physician Advisor  [facility].

## 2024-10-25 NOTE — PLAN OF CARE
"For vital signs and complete assessments, please see documentation flowsheets.     [id number]  Pertinent assessments: Pt A&Ox4 with intermittent confusion. Poor safety awareness. Ax1 with gait belt & walker. VSS. Lung sounds with faint crackles in the bases. O2% in the high 90's on RA Pain controlled with scheduled tylenol. PIV saline locked.    Major Shift Events: none    Treatment Plan: Fall precautions. Symptom management. IV Unasyn. Pain management. IS. Discharge TBD.    Bedside Nurse: Sofia Youngblood RN    Goal Outcome Evaluation:      Plan of Care Reviewed With: patient, family    Overall Patient Progress: improvingOverall Patient Progress: improving    Outcome Evaluation: Pain controlled with PO pain medications. On IV abx, tolerating well no ASE noted.      Problem: Adult Inpatient Plan of Care  Goal: Plan of Care Review  Description: The Plan of Care Review/Shift note should be completed every shift.  The Outcome Evaluation is a brief statement about your assessment that the patient is improving, declining, or no change.  This information will be displayed automatically on your shift  note.  10/25/2024 1856 by Sofia Youngblood RN  Outcome: Progressing  Flowsheets (Taken 10/25/2024 1856)  Plan of Care Reviewed With:   patient   family  Overall Patient Progress: improving  10/25/2024 1855 by Sofia Youngblood RN  Outcome: Progressing  Flowsheets (Taken 10/25/2024 1855)  Outcome Evaluation: Pain controlled with PO pain medications. On IV abx, tolerating well no ASE noted.  Plan of Care Reviewed With:   patient   family  Overall Patient Progress: improving  Goal: Patient-Specific Goal (Individualized)  Description: You can add care plan individualizations to a care plan. Examples of Individualization might be:  \"Parent requests to be called daily at 9am for status\", \"I have a hard time hearing out of my right ear\", or \"Do not touch me to wake me up as it startles  me\".  10/25/2024 1856 by Sofia Youngblood, " RN  Outcome: Progressing  10/25/2024 1855 by Sofia Youngblood RN  Outcome: Progressing  Goal: Absence of Hospital-Acquired Illness or Injury  10/25/2024 1856 by Sofia Youngblood RN  Outcome: Progressing  10/25/2024 1855 by Sofia Youngblood RN  Outcome: Progressing  Intervention: Identify and Manage Fall Risk  Recent Flowsheet Documentation  Taken 10/25/2024 0900 by Sofia Youngblood RN  Safety Promotion/Fall Prevention:   activity supervised   assistive device/personal items within reach   patient and family education  Intervention: Prevent Skin Injury  Recent Flowsheet Documentation  Taken 10/25/2024 0900 by Sofia Youngblood RN  Body Position: position changed independently  Intervention: Prevent Infection  Recent Flowsheet Documentation  Taken 10/25/2024 0900 by Sofia Youngblood RN  Infection Prevention:   hand hygiene promoted   single patient room provided  Taken 10/25/2024 0732 by Sofia Youngblood RN  Infection Prevention:   hand hygiene promoted   single patient room provided  Goal: Optimal Comfort and Wellbeing  10/25/2024 1856 by Sofia Youngblood RN  Outcome: Progressing  10/25/2024 1855 by Sofia Youngblood RN  Outcome: Progressing  Goal: Readiness for Transition of Care  10/25/2024 1856 by Sofia Youngblood RN  Outcome: Progressing  10/25/2024 1855 by Sofia Youngblood RN  Outcome: Progressing     Problem: Pain Acute  Goal: Optimal Pain Control and Function  10/25/2024 1856 by Sofia Youngblood RN  Outcome: Progressing  10/25/2024 1855 by Sofia Youngblood RN  Outcome: Progressing  Intervention: Prevent or Manage Pain  Recent Flowsheet Documentation  Taken 10/25/2024 0900 by Sofia Youngblood RN  Medication Review/Management: medications reviewed  Intervention: Optimize Psychosocial Wellbeing  Recent Flowsheet Documentation  Taken 10/25/2024 0900 by Sofia Youngblood RN  Supportive Measures:   active listening utilized   self-care encouraged     Problem: Skin Injury Risk Increased  Goal: Skin Health and  Integrity  10/25/2024 1856 by Sofia Youngblood RN  Outcome: Progressing  10/25/2024 1855 by Sofia Youngblood RN  Outcome: Progressing  Intervention: Plan: Nurse Driven Intervention: Moisture Management  Recent Flowsheet Documentation  Taken 10/25/2024 0900 by Sofia Youngblood RN  Moisture Interventions:   Encourage regular toileting   No brief in bed   Incontinence pad  Intervention: Optimize Skin Protection  Recent Flowsheet Documentation  Taken 10/25/2024 0900 by Sofia Youngblood RN  Activity Management: activity adjusted per tolerance  Head of Bed (HOB) Positioning: HOB at 20-30 degrees     Problem: Hip Fracture Medical Management  Goal: Optimal Coping with Change in Health Status  10/25/2024 1856 by Sofia Youngblood RN  Outcome: Progressing  10/25/2024 1855 by Sofia Youngblood RN  Outcome: Progressing  Intervention: Support Psychosocial Response to Injury  Recent Flowsheet Documentation  Taken 10/25/2024 0900 by Sofia Youngblood RN  Supportive Measures:   active listening utilized   self-care encouraged  Goal: Absence of Bleeding  10/25/2024 1856 by Sofia Youngblood RN  Outcome: Progressing  10/25/2024 1855 by Sofia Youngblood RN  Outcome: Progressing  Goal: Effective Bowel Elimination  10/25/2024 1856 by Sofia Youngblood RN  Outcome: Progressing  10/25/2024 1855 by Sofia Youngblood RN  Outcome: Progressing  Goal: Baseline Cognitive Function Maintained  10/25/2024 1856 by Sofia Youngblood RN  Outcome: Progressing  10/25/2024 1855 by Sofia Youngblood RN  Outcome: Progressing  Intervention: Maximize Cognitive Function  Recent Flowsheet Documentation  Taken 10/25/2024 0900 by Sofia Youngblood RN  Reorientation Measures:   clock in view   glasses use encouraged  Goal: Absence of Embolism  10/25/2024 1856 by Sofia Youngblood RN  Outcome: Progressing  10/25/2024 1855 by Sofia Youngblood RN  Outcome: Progressing  Goal: Fracture Stability  10/25/2024 1856 by Sofia Youngblood RN  Outcome: Progressing  10/25/2024 1855 by  Youngblood, Sofia A, RN  Outcome: Progressing  Goal: Optimal Functional Performance  10/25/2024 1856 by Sofia Youngblood RN  Outcome: Progressing  10/25/2024 1855 by Sofia Youngblood RN  Outcome: Progressing  Intervention: Promote Optimal Functional Status  Recent Flowsheet Documentation  Taken 10/25/2024 0900 by Sofia Youngblood RN  Range of Motion: active ROM (range of motion) encouraged  Activity Management: activity adjusted per tolerance  Goal: Pain Control and Function  10/25/2024 1856 by Sofia Youngblood RN  Outcome: Progressing  10/25/2024 1855 by Sofia Youngblood RN  Outcome: Progressing  Goal: Effective Urinary Elimination  10/25/2024 1856 by Sofia Youngblood RN  Outcome: Progressing  10/25/2024 1855 by Sofia Youngblood RN  Outcome: Progressing

## 2024-10-25 NOTE — PROGRESS NOTES
[facility]    Medicine Progress Note - Hospitalist Service    Date of Admission:  10/21/2024    Assessment & Plan     Guanako Padilla is a 69 year old male with PMH including cognitive impairment with ataxia and dizziness, HLD, gastric ulcer, BPH, SHREYA, and obesity who presents after a fall at home.  He was walking down the stairs on his deck when he slipped and fell backwards landing on his back and left side.  He fell down about 7-8 stairs.  He was reporting left lateral chest pain.  He had a right hand laceration and also cut to the arm and elbow.  He did hit his head, but did not have loss conscious.  He was having some dizziness prior to falling which is not unusual for him.  His daughter says that he had a bad fall about 2 years ago and since then he said some worsening cognitive impairment and persistent dizziness and balance difficulties.  They are following with neurology in the outpatient setting.  He currently has improved pain control following IV opiates that he received.  He does have increased pain with taking a deep breath, but does not feel short of breath.     History obtained from patient, patient's daughter secondary to his cognitive impairment, medical record, and from Dr. Ortiz in the emergency department.  Blood pressure 131/96, heart rate 92, temperature 98.3  F, oxygen 96% on room air.  WBC 5.7, hemoglobin 12.0, platelet count 245.  Sodium is 145, potassium 4.2, chloride 109, bicarb 19, BUN 24, creatinine 1.01, glucose 157.  Troponin T elevated 30 with repeat 27.  EKG shows NSR with no ST elevation or depression.  Right hand x-ray shows osteoarthritis.  CT of the head is unremarkable.  CT chest/ab/pelvis shows mildly displaced lateral left 3rd-8th rib fractures, acute L2 transverse process fracture, left nonobstructive kidney stone, 4.2 cm ascending aortic aneurysm.  He received Zofran, 4 mg IV morphine, and 1 mg IV Dilaudid.  Pain control is improved.  Dr. Ortiz did  speak with thoracic surgery who recommended admission here for pain control and no current indication for plating procedure.     Continuing pain control, PT. some concern for developing pneumonia on chest imaging, however repeat imaging seems more consistent with atelectasis.  Still no fever, cough, increased work of breathing.  Likely discharge home 10/26.    Fall  Acute traumatic left 3rd-8th rib fractures  Acute traumatic L2 transverse process fracture: He was walking down the stairs on the deck at home and is a little bit dizzy and he is falling landing on his back and left side.  He fell down about 7-8 stairs.  He is reporting left lateral chest pain that is worse with movement and taking a deep breath.  He did hit his head, but did not have loss of consciousness.  CT head unremarkable.  X-ray of the right hand shows osteoarthritis.  CT chest/ab/pelvis shows mildly displaced left lateral 3rd-8th rib fractures and a L2 transverse process fracture.  Contacted general surgery for trauma eval contacted thoracic surgery who recommended admission here for pain control and no current indication for bleeding.  -Consulted general surgery for trauma evaluation  -Consult thoracic surgery, plan is for conservative management and outpatient follow-up.  -Consult anesthesia for nerve block  -Consult neurosurgery for the L2 fracture.  Planning conservative management with weight restriction of 5 to 10 pounds until pain is totally resolved.  Otherwise no plan for bracing or surgical intervention per neurosurgery.  -Consult PT/OT/SW, fall precautions.  Initial rec is home w/ assist.  -Scheduled acetaminophen 975 mg 3 times daily, scheduled gabapentin 100 mg 3 times daily, scheduled methocarbamol 500 mg 3 times daily, place lidocaine patch, give 1 dose IV Toradol now, ibuprofen 400 mg every 6 hours as needed, oxycodone 5 mg for moderate and 10 mg for severe pain every 4 hours as needed, IV Dilaudid 0.2 mg for moderate and 0.4 mg  "for severe acute pain every 2 hours as needed  -Incentive spirometer, continuous pulse ox  -scheduled bowel regimen.    Possible left lower lobe pneumonia: Unclear if this might be aspiration or secondary to rib fractures.  Has not had fever or elevated white blood count but it seems there is enough here to treat with a course of antibiotics.  --Started IV Unasyn on 10/23  --Anticipate discharging with oral Augmentin     Cognitive impairment  Ataxia: He is currently following with neurology as an outpatient for worsening cognitive impairment and ataxia with dizziness with ambulation.  This has been progressive ever since a bad fall couple years ago per his daughter.  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  -Fall precautions     HLD: Resume PTA atorvastatin 10 mg at bedtime.     Elevated troponin: Troponin T was elevated at 30 repeat downtrending to 27.  EKG shows NSR with no ST elevation or depression.  Chest pain is secondary to his rib fractures.  Has trace-1+ bilateral extremity edema on exam.     -TTE grossly normal.     Ascending thoracic aortic aneurysm: 4.2 cm ascending thoracic aortic aneurysm seen on CT.     History of gastric ulcer: Ordered pantoprazole daily in case he is receiving any as needed ibuprofen.     BPH: Monitor for any retention.  Not currently on Flomax.     SHREYA: Unclear if using CPAP or not, may need to add this while here if not meeting incentive spirometry goals per the rib fracture order sent.     Obesity: BMI 31.        Diet: Combination Diet Regular Diet Adult    DVT Prophylaxis: Pneumatic Compression Devices  [place]ios Catheter: Not present  Lines: None     Cardiac Monitoring: None  Code Status: Full Code      Clinically Significant Risk Factors                             # Overweight: Estimated body mass index is 29.45 kg/m  as calculated from the following:    Height as of this encounter: 1.88 m (6' 2\").    Weight " as of this encounter: 104.1 kg (229 lb 6.4 oz)., PRESENT ON ADMISSION     # Financial/Environmental Concerns: none         Disposition Plan     Medically Ready for Discharge: Anticipated Tomorrow pending mobility, pain control, family's ability to provide the support he needs.             Jonathan Jackson MD  Hospitalist Service  [facility]  Securely message with [employer] (more info)  Text page via [facility] Paging/Directory   ______________________________________________________________________    Interval History   No acute events overnight.  Pain somewhat improved, breathing feels okay today.  Able to ambulate the hallways and do some stairs with PT and tolerated this pretty well.  Discussed plan to discharge 10/26 as wife is arranging the home to be more amenable to him returning and trying to stay on 1 floor for a week or so.      Physical Exam   Vital Signs: Temp: 98.1  F (36.7  C) Temp src: Oral BP: 118/73 Pulse: 84   Resp: 24 SpO2: 92 % O2 Device: None (Room air)    Weight: 229 lbs 6.4 oz    General: Alert, awake, no acute distress.  HEENT: NC/AT, eyes anicteric, external occular movements intact, face symmetric.  Cardiac: RRR, S1, S2.  No murmurs appreciated.  Pulmonary: Normal chest rise, normal work of breathing.  Lungs CTA BL  Abdomen: soft, non-tender, non-distended.  Bowel Sounds Present.  No guarding.  Extremities: no deformities.  Warm, well perfused.  Skin: no rashes or lesions noted.  Warm and Dry.  Neuro: No focal deficits noted.  Speech clear.  Coordination and strength grossly normal.  Psych: Appropriate affect.      Medical Decision Making       40 MINUTES SPENT BY ME on the date of service doing chart review, history, exam, documentation & further activities per the note.      Data   Imaging results reviewed over the past 24 hrs:   Recent Results (from the past 24 hours)   XR Chest 2 Views    Narrative    CHEST TWO VIEWS 10/25/2024 9:06 AM     HISTORY: Multiple rib fractures,  possible pneumonia. Question  expanding effusion.    COMPARISON: October 24, 2024       Impression    IMPRESSION: Minimal pleural thickening on the left. No pneumothorax.  Question some minimal pleural fluid on the left as well. Some  associated probable compressive atelectasis vs. less likely  infiltrate. Right lung clear. Rib fractures less well seen on plain  radiography. The cardiac silhouette is not enlarged. Pulmonary  vasculature is unremarkable.    LAZARO ANNA MD         SYSTEM ID:  [id number]

## 2024-10-25 NOTE — PLAN OF CARE
"For vital signs and complete assessments, please see documentation flowsheets.     [id number]  Pertinent assessments: Pt A&Ox4 with intermittent confusion. On RA. Ax1 with gait belt & walker. Afebrile. VSS. Denies nausea & SOB. Pain controlled with scheduled tylenol. PIV saline locked.    Major Shift Events: none    Treatment Plan: Fall precautions. Symptom management. IV Unasyn. Pain management. IS. Discharge TBD.    Bedside Nurse: Denis Guerrier RN     Problem: Adult Inpatient Plan of Care  Goal: Plan of Care Review  Description: The Plan of Care Review/Shift note should be completed every shift.  The Outcome Evaluation is a brief statement about your assessment that the patient is improving, declining, or no change.  This information will be displayed automatically on your shift  note.  Outcome: Progressing  Flowsheets (Taken 10/25/2024 0618)  Outcome Evaluation: VSS. On RA. Pain controlled with scheduled tylenol. IV abx continued.  Plan of Care Reviewed With: patient  Overall Patient Progress: improving  Goal: Patient-Specific Goal (Individualized)  Description: You can add care plan individualizations to a care plan. Examples of Individualization might be:  \"Parent requests to be called daily at 9am for status\", \"I have a hard time hearing out of my right ear\", or \"Do not touch me to wake me up as it startles  me\".  Outcome: Progressing  Goal: Absence of Hospital-Acquired Illness or Injury  Outcome: Progressing  Intervention: Identify and Manage Fall Risk  Recent Flowsheet Documentation  Taken 10/25/2024 0112 by Denis Guerrier RN  Safety Promotion/Fall Prevention:   activity supervised   assistive device/personal items within reach   safety round/check completed   treat reversible contributory factors   lighting adjusted   nonskid shoes/slippers when out of bed   patient and family education   clutter free environment maintained   room near nurse's station   room organization consistent  Intervention: " Prevent Skin Injury  Recent Flowsheet Documentation  Taken 10/25/2024 0112 by Denis Guerrier RN  Body Position: position changed independently  Taken 10/24/2024 2344 by Denis Guerrier RN  Body Position: position changed independently  Intervention: Prevent and Manage VTE (Venous Thromboembolism) Risk  Recent Flowsheet Documentation  Taken 10/25/2024 0112 by Denis Guerrier RN  VTE Prevention/Management: SCDs off (sequential compression devices)  Intervention: Prevent Infection  Recent Flowsheet Documentation  Taken 10/25/2024 0112 by Denis Guerrier RN  Infection Prevention:   single patient room provided   rest/sleep promoted   cohorting utilized  Goal: Optimal Comfort and Wellbeing  Outcome: Progressing  Goal: Readiness for Transition of Care  Outcome: Progressing     Problem: Pain Acute  Goal: Optimal Pain Control and Function  Outcome: Progressing  Intervention: Prevent or Manage Pain  Recent Flowsheet Documentation  Taken 10/25/2024 0112 by Denis Guerrier RN  Medication Review/Management: medications reviewed     Problem: Skin Injury Risk Increased  Goal: Skin Health and Integrity  Outcome: Progressing     Problem: Hip Fracture Medical Management  Goal: Optimal Coping with Change in Health Status  Outcome: Progressing  Goal: Absence of Bleeding  Outcome: Progressing  Goal: Effective Bowel Elimination  Outcome: Progressing  Goal: Baseline Cognitive Function Maintained  Outcome: Progressing  Goal: Absence of Embolism  Outcome: Progressing  Intervention: Prevent or Manage Embolism Risk  Recent Flowsheet Documentation  Taken 10/25/2024 0112 by Denis Guerrier RN  VTE Prevention/Management: SCDs off (sequential compression devices)  Goal: Fracture Stability  Outcome: Progressing  Goal: Optimal Functional Performance  Outcome: Progressing  Goal: Pain Control and Function  Outcome: Progressing  Goal: Effective Urinary Elimination  Outcome: Progressing     Goal Outcome Evaluation:      Plan of Care  Reviewed With: patient    Overall Patient Progress: improvingOverall Patient Progress: improving    Outcome Evaluation: VSS. On RA. Pain controlled with scheduled tylenol. IV abx continued.

## 2024-10-25 NOTE — PLAN OF CARE
"Assessments:   A&Ox3, forgetful, can be confused at times. On room air, self administered IS. Trace edema noted to the BLE. PRN oxycodone given for pain with ambulation. Scattered skin tears to upper extremities covered with dressings.      Treatment Plan: Pain management, Encourage use of IS, Unasyn    Bedside Nurse: Raoul Holbrook RN      Problem: Adult Inpatient Plan of Care  Goal: Plan of Care Review  Description: The Plan of Care Review/Shift note should be completed every shift.  The Outcome Evaluation is a brief statement about your assessment that the patient is improving, declining, or no change.  This information will be displayed automatically on your shift  note.  Outcome: Progressing  Flowsheets (Taken 10/24/2024 2236)  Outcome Evaluation: pain with ambulation, managed with PRN oxycodone, had bm on previous shift per patient  Plan of Care Reviewed With:   patient   child  Overall Patient Progress: improving  Goal: Patient-Specific Goal (Individualized)  Description: You can add care plan individualizations to a care plan. Examples of Individualization might be:  \"Parent requests to be called daily at 9am for status\", \"I have a hard time hearing out of my right ear\", or \"Do not touch me to wake me up as it startles  me\".  Outcome: Progressing  Goal: Absence of Hospital-Acquired Illness or Injury  Outcome: Progressing  Intervention: Identify and Manage Fall Risk  Recent Flowsheet Documentation  Taken 10/24/2024 2030 by Raoul Holbrook RN  Safety Promotion/Fall Prevention:   activity supervised   assistive device/personal items within reach   safety round/check completed   treat reversible contributory factors   lighting adjusted   nonskid shoes/slippers when out of bed   patient and family education  Intervention: Prevent Skin Injury  Recent Flowsheet Documentation  Taken 10/24/2024 2030 by Raoul Holbrook RN  Body Position: position changed independently  Intervention: Prevent and Manage " VTE (Venous Thromboembolism) Risk  Recent Flowsheet Documentation  Taken 10/24/2024 2030 by Raoul Holbrook RN  VTE Prevention/Management: SCDs off (sequential compression devices)  Intervention: Prevent Infection  Recent Flowsheet Documentation  Taken 10/24/2024 2030 by Raoul Holbrook RN  Infection Prevention: rest/sleep promoted  Goal: Optimal Comfort and Wellbeing  Outcome: Progressing  Intervention: Monitor Pain and Promote Comfort  Recent Flowsheet Documentation  Taken 10/24/2024 2123 by Raoul Holbrook RN  Pain Management Interventions: medication (see MAR)  Goal: Readiness for Transition of Care  Outcome: Progressing     Problem: Pain Acute  Goal: Optimal Pain Control and Function  Outcome: Progressing  Intervention: Develop Pain Management Plan  Recent Flowsheet Documentation  Taken 10/24/2024 2123 by Raoul Holbrook RN  Pain Management Interventions: medication (see MAR)  Intervention: Prevent or Manage Pain  Recent Flowsheet Documentation  Taken 10/24/2024 2030 by Raoul Holbrook RN  Medication Review/Management: medications reviewed     Problem: Skin Injury Risk Increased  Goal: Skin Health and Integrity  Outcome: Progressing  Intervention: Plan: Nurse Driven Intervention: Moisture Management  Recent Flowsheet Documentation  Taken 10/24/2024 2030 by Raoul Holbrook RN  Moisture Interventions: Encourage regular toileting  Intervention: Optimize Skin Protection  Recent Flowsheet Documentation  Taken 10/24/2024 2030 by Raoul Holbrook RN  Activity Management: activity adjusted per tolerance  Head of Bed (HOB) Positioning: HOB at 20-30 degrees     Problem: Hip Fracture Medical Management  Goal: Optimal Coping with Change in Health Status  Outcome: Progressing  Goal: Absence of Bleeding  Outcome: Progressing  Goal: Effective Bowel Elimination  Outcome: Progressing  Goal: Baseline Cognitive Function Maintained  Outcome: Progressing  Goal: Absence of  Embolism  Outcome: Progressing  Intervention: Prevent or Manage Embolism Risk  Recent Flowsheet Documentation  Taken 10/24/2024 2030 by Raoul Holbrook RN  VTE Prevention/Management: SCDs off (sequential compression devices)  Goal: Fracture Stability  Outcome: Progressing  Goal: Optimal Functional Performance  Outcome: Progressing  Intervention: Promote Optimal Functional Status  Recent Flowsheet Documentation  Taken 10/24/2024 2030 by Raoul Holbrook, RN  Activity Management: activity adjusted per tolerance  Goal: Pain Control and Function  Outcome: Progressing  Intervention: Manage Acute Orthopaedic-Related Pain  Recent Flowsheet Documentation  Taken 10/24/2024 2123 by Raoul Holbrook RN  Pain Management Interventions: medication (see MAR)  Goal: Effective Urinary Elimination  Outcome: Progressing   Goal Outcome Evaluation:      Plan of Care Reviewed With: patient, child    Overall Patient Progress: improvingOverall Patient Progress: improving    Outcome Evaluation: pain with ambulation, managed with PRN oxycodone, had bm on previous shift per patient

## 2024-10-26 VITALS
DIASTOLIC BLOOD PRESSURE: 70 MMHG | OXYGEN SATURATION: 94 % | WEIGHT: 229.4 LBS | RESPIRATION RATE: 20 BRPM | TEMPERATURE: 98.6 F | BODY MASS INDEX: 29.44 KG/M2 | SYSTOLIC BLOOD PRESSURE: 121 MMHG | HEIGHT: 74 IN | HEART RATE: 81 BPM

## 2024-10-26 LAB
CREAT SERPL-MCNC: 0.84 MG/DL (ref 0.67–1.17)
EGFRCR SERPLBLD CKD-EPI 2021: >90 ML/MIN/1.73M2

## 2024-10-26 PROCEDURE — 36415 COLL VENOUS BLD VENIPUNCTURE: CPT | Performed by: INTERNAL MEDICINE

## 2024-10-26 PROCEDURE — 250N000011 HC RX IP 250 OP 636: Performed by: INTERNAL MEDICINE

## 2024-10-26 PROCEDURE — 250N000013 HC RX MED GY IP 250 OP 250 PS 637: Performed by: INTERNAL MEDICINE

## 2024-10-26 PROCEDURE — 99239 HOSP IP/OBS DSCHRG MGMT >30: CPT | Performed by: STUDENT IN AN ORGANIZED HEALTH CARE EDUCATION/TRAINING PROGRAM

## 2024-10-26 PROCEDURE — 82565 ASSAY OF CREATININE: CPT | Performed by: INTERNAL MEDICINE

## 2024-10-26 RX ORDER — ACETAMINOPHEN 325 MG/1
975 TABLET ORAL EVERY 8 HOURS PRN
Qty: 60 TABLET | Refills: 0 | Status: SHIPPED | OUTPATIENT
Start: 2024-10-26

## 2024-10-26 RX ORDER — GABAPENTIN 300 MG/1
300 CAPSULE ORAL 3 TIMES DAILY
Qty: 90 CAPSULE | Refills: 0 | Status: SHIPPED | OUTPATIENT
Start: 2024-10-26

## 2024-10-26 RX ADMIN — POLYETHYLENE GLYCOL 3350 17 G: 17 POWDER, FOR SOLUTION ORAL at 08:46

## 2024-10-26 RX ADMIN — IBUPROFEN 400 MG: 400 TABLET, FILM COATED ORAL at 03:41

## 2024-10-26 RX ADMIN — BACITRACIN ZINC AND POLYMYXIN B SULFATE: 500; 10000 OINTMENT TOPICAL at 08:50

## 2024-10-26 RX ADMIN — ACETAMINOPHEN 325MG 975 MG: 325 TABLET ORAL at 01:05

## 2024-10-26 RX ADMIN — GABAPENTIN 300 MG: 300 CAPSULE ORAL at 13:56

## 2024-10-26 RX ADMIN — GABAPENTIN 300 MG: 300 CAPSULE ORAL at 08:45

## 2024-10-26 RX ADMIN — PANTOPRAZOLE SODIUM 40 MG: 40 TABLET, DELAYED RELEASE ORAL at 06:03

## 2024-10-26 RX ADMIN — AMPICILLIN SODIUM AND SULBACTAM SODIUM 3 G: 2; 1 INJECTION, POWDER, FOR SOLUTION INTRAMUSCULAR; INTRAVENOUS at 05:26

## 2024-10-26 RX ADMIN — ACETAMINOPHEN 325MG 975 MG: 325 TABLET ORAL at 08:45

## 2024-10-26 RX ADMIN — LIDOCAINE 1 PATCH: 4 PATCH TOPICAL at 08:46

## 2024-10-26 ASSESSMENT — ACTIVITIES OF DAILY LIVING (ADL)
ADLS_ACUITY_SCORE: 0

## 2024-10-26 NOTE — DISCHARGE SUMMARY
"[facility]  Hospitalist Discharge Summary      Date of Admission:  10/21/2024  Date of Discharge:  10/26/2024  Discharging Provider: Jonathan Jackson MD  Discharge Service: Hospitalist Service    Discharge Diagnoses   Mechanical fall  Acute traumatic fracture of left 3rd-8th ribs X line acute traumatic L2 transverse process fracture  Cognitive impairment  Ataxia  Hyperlipidemia  Ascending thoracic aortic aneurysm  History of gastric ulcer  BPH  SHREYA    Clinically Significant Risk Factors     # Overweight: Estimated body mass index is 29.45 kg/m  as calculated from the following:    Height as of this encounter: 1.88 m (6' 2\").    Weight as of this encounter: 104.1 kg (229 lb 6.4 oz).       Follow-ups Needed After Discharge   Follow-up Appointments     Follow-up and recommended labs and tests       The sutures in your hand will need to come out approximately 1 week after   they were placed.  Please make an outpatient clinic appointment in 3 days   to have the wound assessed and sutures removed if it is healing well.    You do not need any specific follow-up for your rib or back fractures.    Please limit lifting to 5-10 pounds until your pain has resolved to avoid   further injuring yourself.            Unresulted Labs Ordered in the Past 30 Days of this Admission       No orders found from 9/21/2024 to 10/22/2024.          Discharge Disposition   Discharged to home  Condition at discharge: Stable    Hospital Course   Guanako Padilla is a 69 year old male with PMH including cognitive impairment with ataxia and dizziness, HLD, gastric ulcer, BPH, SHREYA, and obesity who presents after a fall at home.  He was walking down the stairs on his deck when he slipped and fell backwards landing on his back and left side.  He fell down about 7-8 stairs.  He was reporting left lateral chest pain.  He had a right hand laceration and also cut to the arm and elbow.  He did hit his head, but did not have loss " conscious.  He was having some dizziness prior to falling which is not unusual for him.  His daughter says that he had a bad fall about 2 years ago and since then he said some worsening cognitive impairment and persistent dizziness and balance difficulties.  They are following with neurology in the outpatient setting.    Ultimately found to have acute fractures of the left 3-8 ribs and L2 transverse process fracture.  Neurosurgery consulted, fine with conservative management, did not require bracing.  Did have some concern for developing pneumonia several days into hospitalization, but repeat chest imaging appeared more consistent with atelectasis, did not have any fever, increasing oxygen needs, overall feeling improved prior to discharge.    Fall  Acute traumatic left 3rd-8th rib fractures  Acute traumatic L2 transverse process fracture: He was walking down the stairs on the deck at home and is a little bit dizzy and he is falling landing on his back and left side.  He fell down about 7-8 stairs.  He is reporting left lateral chest pain that is worse with movement and taking a deep breath.  He did hit his head, but did not have loss of consciousness.  CT head unremarkable.  X-ray of the right hand shows osteoarthritis.  CT chest/ab/pelvis shows mildly displaced left lateral 3rd-8th rib fractures and a L2 transverse process fracture.  Contacted general surgery for trauma eval contacted thoracic surgery who recommended admission here for pain control and no current indication for bleeding.  -Consult thoracic surgery, plan is for conservative management and outpatient follow-up.  -Consult neurosurgery for the L2 fracture.  Planning conservative management with weight restriction of 5 to 10 pounds until pain is totally resolved.  Otherwise no plan for bracing or surgical intervention per neurosurgery.  -Consulted PT/OT/SW  -Will discharge with as needed Tylenol and scheduled gabapentin.  Can discontinue the gabapentin  once symptoms have resolved  -No specific follow-up is needed for the rib or back pain unless symptoms do not improve or worsen    Possible left lower lobe pneumonia  Unclear if this might be aspiration or secondary to rib fractures.  No elevated white count, fever, worsening of breathing, increased oxygen requirement.  Was started on IV Unasyn for empiric coverage, but repeat imaging more consistent with atelectasis.  No other evidence of infection, so will not discharge on antibiotics.     Cognitive impairment  Ataxia: He is currently following with neurology as an outpatient for worsening cognitive impairment and ataxia with dizziness with ambulation.  This has been progressive ever since a bad fall couple years ago per his daughter.  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     HLD: Resume PTA atorvastatin 10 mg at bedtime.     Elevated troponin: Troponin T was elevated at 30 repeat downtrending to 27.  EKG shows NSR with no ST elevation or depression.  Chest pain is secondary to his rib fractures.  Has trace-1+ bilateral extremity edema on exam.     -TTE grossly normal.     Ascending thoracic aortic aneurysm: 4.2 cm ascending thoracic aortic aneurysm seen on CT.     History of gastric ulcer: Ordered pantoprazole daily in case he is receiving any as needed ibuprofen.     BPH: Monitor for any retention.  Not currently on Flomax.     SHREYA: Unclear if using CPAP or not, may need to add this while here if not meeting incentive spirometry goals per the rib fracture order sent.     Obesity: BMI 31.    Consultations This Hospital Stay   SURGERY GENERAL IP CONSULT  ANESTHESIOLOGY IP CONSULT  PHYSICAL THERAPY ADULT IP CONSULT  NUTRITION SERVICES ADULT IP CONSULT  THORACIC SURGERY IP CONSULT  CARE MANAGEMENT / SOCIAL WORK IP CONSULT  NEUROSURGERY IP CONSULT  OCCUPATIONAL THERAPY ADULT IP CONSULT    Code Status   Full Code    Time Spent on this  Encounter   I, Jonathan Jackson MD, personally saw the patient today and spent greater than 30 minutes discharging this patient.       Jonathan Jackson MD  [place] [address] [facility]  [address]  [facility] [address]  Phone: [phone]  Fax: [phone]  ______________________________________________________________________    Physical Exam   Vital Signs: Temp: 98.6  F (37  C) Temp src: Oral BP: 121/70 Pulse: 81   Resp: 20 SpO2: 94 % O2 Device: None (Room air)    Weight: 229 lbs 6.4 oz  General: Alert, awake, no acute distress.  HEENT: NC/AT, eyes anicteric, external occular movements intact, face symmetric.  Cardiac: RRR, S1, S2.  No murmurs appreciated.  Pulmonary: Normal chest rise, normal work of breathing.  Lungs CTA BL  Abdomen: soft, non-tender, non-distended.  Bowel Sounds Present.  No guarding.  Extremities: no deformities.  Warm, well perfused.  Skin: no rashes or lesions noted.  Warm and Dry.  Neuro: No focal deficits noted.  Speech clear.  Coordination and strength grossly normal.  Psych: Appropriate affect.       Primary Care Physician   Roberto Vega    Discharge Orders      Primary Care - Care Coordination Referral      Reason for your hospital stay    Injury sustained after a fall, including fractures to your ribs and back.     Follow-up and recommended labs and tests     The sutures in your hand will need to come out approximately 1 week after they were placed.  Please make an outpatient clinic appointment in 3 days to have the wound assessed and sutures removed if it is healing well.    You do not need any specific follow-up for your rib or back fractures.  Please limit lifting to 5-10 pounds until your pain has resolved to avoid further injuring yourself.     Activity    Your activity upon discharge: activity as tolerated.  Limit your lifting to 5-10 pounds until your pain has mostly resolved.     Walker Order for DME - ONLY FOR DME     Diet    Follow this diet  upon discharge: Current Diet:Orders Placed This Encounter      Combination Diet Regular Diet Adult       Discharge Follow-up Details      Primary Care - Care Coordination Referral      Reason for Referral: Complex Medical Concerns/Education    Complex Medical Concerns: New Diagnosis - Use Comments    Clinical Staff have discussed the Care Coordination Referral with the patient and/or caregiver: No            Significant Results and Procedures   Most Recent 3 CBC's:  Recent Labs   Lab Test 10/22/24  0546 10/21/24  2004 03/08/23  1251   WBC 7.8 5.7 5.7   HGB 11.5* 12.1* 13.5   MCV 92 90 90    245 228     Most Recent 3 BMP's:  Recent Labs   Lab Test 10/26/24  0748 10/22/24  0546 10/21/24  2004 03/08/23  1251   NA  --  142 145 141   POTASSIUM  --  4.5 4.2 4.5   CHLORIDE  --  109* 109* 105   CO2  --  22 19* 26   BUN  --  26.3* 24.7* 18.2   CR 0.84 0.92 1.01 0.97   ANIONGAP  --  11 17* 10   SARY  --  8.3* 8.8 9.3   GLC  --  141* 157* 87     Most Recent 2 LFT's:  Recent Labs   Lab Test 03/08/23  1251 12/16/21  1152   AST 26 19   ALT 17 23   ALKPHOS 75 72   BILITOTAL 0.9 0.6   ,   Results for orders placed or performed during the hospital encounter of 10/21/24   Head CT w/o contrast    Narrative    EXAM: CT HEAD W/O CONTRAST  LOCATION: [facility]  DATE: 10/21/2024    INDICATION: head injury after fall  COMPARISON: None.  TECHNIQUE: Routine CT Head without IV contrast. Multiplanar reformats. Dose reduction techniques were used.    FINDINGS:  INTRACRANIAL CONTENTS: No evidence of acute intracranial hemorrhage or mass effect. Scattered foci of decreased attenuation within the cerebral hemispheric white matter which are nonspecific, though most commonly ascribed to chronic small vessel ischemic   disease. The ventricles and sulci are prominent consistent with mild brain parenchymal volume loss. Gray-white matter differentiation is maintained. The basilar cisterns are patent.    VISUALIZED  ORBITS/SINUSES/MASTOIDS: The globes are unremarkable. The partially imaged paranasal sinuses, mastoid air cells and middle ear cavities are unremarkable.     BONES/SOFT TISSUES: The visualized skull base and calvarium are unremarkable.      Impression    IMPRESSION:    1.  No evidence of acute intracranial hemorrhage or mass effect.   XR Hand Right G/E 3 Views    Narrative    EXAM: XR HAND RIGHT G/E 3 VIEWS  LOCATION: [facility]  DATE: 10/21/2024    INDICATION: fall, pain  COMPARISON: 7/4/2011.      Impression    IMPRESSION: No acute fracture or dislocation. 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 Relatively mild osteoarthritis of the triscaphe   joint and a few other interphalangeal joints of the fingers has also worsened.   CT Chest/Abdomen/Pelvis w Contrast    Narrative    EXAM: CT CHEST/ABDOMEN/PELVIS W CONTRAST  LOCATION: [facility]  DATE: 10/21/2024    INDICATION: Left-sided chest pain after fall downstairs.  COMPARISON: CT chest 8/6/2024.  TECHNIQUE: CT scan of the chest, abdomen, and pelvis was performed following injection of IV contrast. Multiplanar reformats were obtained. Dose reduction techniques were used.   CONTRAST: 100mL Isovue 370    FINDINGS: Patient was imaged with arm(s) at side, limiting study quality.    LUNGS AND PLEURA: Trachea and large airways are patent. No focal airspace consolidation. Mild dependent atelectatic change.     No suspicious pulmonary nodule.    No pneumothorax.    Trace left pleural fluid.     MEDIASTINUM/AXILLAE: Unchanged mild mediastinal lymphadenopathy. No appreciable hilar lymphadenopathy.     Thoracic esophagus is unremarkable.    No axillary lymphadenopathy.    Mild bilateral gynecomastia.    Ascending thoracic aortic aneurysm, measuring 4.2 cm, unchanged. Mild atheromatous disease.    Mild cardiomegaly. No  pericardial effusion.    CORONARY ARTERY CALCIFICATION: Mild.    HEPATOBILIARY: Mild hepatic steatosis.    Gallbladder is normal.    No intrahepatic or intrahepatic biliary ductal dilatation.    PANCREAS: Enhances normally. No peripancreatic inflammatory fat stranding.    SPLEEN: Enhances normally. Normal size.    ADRENAL GLANDS: Normal.    KIDNEYS: Both kidneys enhance symmetrically, without hydronephrosis. Benign appearing renal cyst(s), in addition to other low attenuation renal lesion(s), which are too small to characterize, though statistically likely to represent simple cysts; no   further follow up recommended. Additional small parapelvic cysts at the inferior pole left kidney.    Tiny nonobstructing stones of the inferior pole left kidney.    Urinary bladder is unremarkable.    PELVIC ORGANS: No suspicious abnormality.    BOWEL: No evidence of acute gastrointestinal inflammation or obstruction. Normal appendix.    No intraperitoneal free fluid or free air. Small bilateral fat-containing inguinal hernias.    LYMPH NODES: No suspicious abdominal or pelvic lymphadenopathy.    VASCULATURE: No abdominal aortic aneurysm. Mild atheromatous disease.    MUSCULOSKELETAL: Acute, nondisplaced to mildly displaced fractures of the lateral left third through eighth ribs. Multiple old bilateral anterolateral rib fracture deformities. Old fracture deformity of the posterolateral right 12th rib. Acute,   nondisplaced fracture of the left L2 transverse process. Chronic, mild superior endplate height loss at the T3 vertebral body. Elastofibroma dorsi. Left glenohumeral joint effusion.    OTHER: No additionally significant abnormalities.      Impression    IMPRESSION:   1.  Acute, nondisplaced to mildly displaced fractures of the lateral left third through eighth ribs.  2.  Acute, nondisplaced fracture of the left L2 transverse process.  3.  Trace left pleural fluid. No pneumothorax.  4.  Unchanged ascending thoracic aortic  aneurysm, measuring 4.2 cm.  5.  Mild hepatic steatosis.  6.  Nonobstructing left nephrolithiasis.     XR Chest 1 View    Narrative    CHEST ONE VIEW   10/23/2024 7:50 AM     HISTORY: Trauma Patient with Rib Fracture(s).    COMPARISON: CT 10/21/2024. Chest x-ray 12/20/2022.      Impression    IMPRESSION: New patchy consolidation at the left lung base worrisome  for new airspace disease versus atelectasis. Correlate clinically with  any signs of pneumonia. Several left rib fractures including the  third-eighth ribs. No visible pneumothorax. Stable cardiac silhouette.    NANCY RODRIGUEZ MD         SYSTEM ID:  [id number]   XR Chest Port 1 View    Narrative    CHEST ONE VIEW  10/24/2024 8:29 AM     HISTORY: Trauma Patient with Rib Fracture(s)    COMPARISON: 10/23/2024      Impression    IMPRESSION: Enlarged cardiomediastinal silhouette. Left retrocardiac  opacity appears more conspicuous and may reflect combination of  pleural fluid, pneumonia and atelectasis. Multiple left-sided rib  fractures are again seen.    TAMMY LAW MD         SYSTEM ID:  [id number]   XR Chest 2 Views    Narrative    CHEST TWO VIEWS 10/25/2024 9:06 AM     HISTORY: Multiple rib fractures, possible pneumonia. Question  expanding effusion.    COMPARISON: October 24, 2024       Impression    IMPRESSION: Minimal pleural thickening on the left. No pneumothorax.  Question some minimal pleural fluid on the left as well. Some  associated probable compressive atelectasis vs. less likely  infiltrate. Right lung clear. Rib fractures less well seen on plain  radiography. The cardiac silhouette is not enlarged. Pulmonary  vasculature is unremarkable.    LAZARO ANNA MD         SYSTEM ID:  [id number]   Echocardiogram Complete     Value    LVEF  55-60%    Narrative    [id number]  [id number]  [id number]  [id number]^CLAIRE^ANTONIETTA^[facility]  Echocardiography Laboratory  [address] [place] [address]     Name: KAROL SCHAFER  MRN:  [id number]  : 1955  Study Date: 10/22/2024 08:04 AM  Age: 69 yrs  Gender: Male  Patient Location: [facility]  Reason For Study: Dizziness  Ordering Physician: ANTONIETTA RANGEL  Referring Physician: Roberto Vega  Performed By: Brooklynn Rowland     BSA: 2.3 m2  Height: 74 in  Weight: 229 lb  HR: 71  BP: 118/79 mmHg  ______________________________________________________________________________  Procedure  Complete Portable Echo Adult. Definity (NDC #01347-432) given intravenously.  Technically difficult study.Extremely difficult acoustic windows despite the  use of contrast for endcardial border definition.  ______________________________________________________________________________  Interpretation Summary     No cardiac cause of dizziness seen in this study. The study was technically  difficult.  ______________________________________________________________________________  Left Ventricle  The left ventricle is normal in size. There is normal left ventricular wall  thickness. The visual ejection fraction is 55-60%. Left ventricular diastolic  function is indeterminate.     Right Ventricle  The right ventricle is normal in structure, function and size.     Atria  The left atrium is mildly dilated. Right atrial size is normal.     Mitral Valve  There is mild mitral annular calcification. The mitral valve leaflets are  mildly thickened. There is trace to mild mitral regurgitation.     Tricuspid Valve  Normal tricuspid valve. There is trace tricuspid regurgitation. Right  ventricular systolic pressure is normal.     Aortic Valve  There is trivial trileaflet aortic sclerosis.     Pulmonic Valve  The pulmonic valve is not well visualized.     Vessels  Aortic root dilatation is present. Ascending aorta dilatation is present.  Dilation of the inferior vena cava is present with abnormal respiratory  variation in diameter.     Pericardium  There is no pericardial effusion.     Rhythm  Sinus rhythm was  noted.  ______________________________________________________________________________  MMode/2D Measurements & Calculations  IVSd: 1.6 cm     LVIDd: 5.5 cm  LVIDs: 3.8 cm  LVPWd: 1.4 cm  IVC diam: 2.6 cm  FS: 30.1 %  LV mass(C)d: 369.8 grams  LV mass(C)dI: 160.6 grams/m2  Ao root diam: 4.1 cm  LVOT diam: 2.4 cm  LVOT area: 4.4 cm2  Ao root diam index Ht(cm/m): 2.2  Ao root diam index BSA (cm/m2): 1.8  LA Volume (BP): 84.9 ml  LA Volume Index (BP): 36.9 ml/m2  RV Base: 3.3 cm     RWT: 0.51  TAPSE: 1.7 cm     Doppler Measurements & Calculations  MV E max jose: 82.7 cm/sec  MV A max jose: 45.0 cm/sec  MV E/A: 1.8  MV max PG: 3.0 mmHg  MV mean P.3 mmHg  MV V2 VTI: 20.4 cm  MVA(VTI): 4.0 cm2  MV dec slope: 489.3 cm/sec2  MV dec time: 0.17 sec  Ao V2 max: 112.4 cm/sec  Ao max P.0 mmHg  Ao V2 mean: 88.1 cm/sec  Ao mean PG: 3.3 mmHg  Ao V2 VTI: 24.0 cm  YENY(I,D): 3.4 cm2  YENY(V,D): 3.6 cm2  LV V1 max PG: 3.3 mmHg  LV V1 max: 91.3 cm/sec  LV V1 VTI: 18.4 cm  SV(LVOT): 81.6 ml  SI(LVOT): 35.4 ml/m2  PA V2 max: 72.1 cm/sec  PA max P.1 mmHg  PA acc time: 0.11 sec  AV Jose Ratio (DI): 0.81  YENY Index (cm2/m2): 1.5  E/E' av.3  Lateral E/e': 8.8  Medial E/e': 15.8     RV S Jose: 9.5 cm/sec     ______________________________________________________________________________  Report approved by: Marva Godfrey 10/22/2024 11:02 AM             Discharge Medications   Current Discharge Medication List        START taking these medications    Details   acetaminophen (TYLENOL) 325 MG tablet Take 3 tablets (975 mg) by mouth every 8 hours as needed for mild pain.  Qty: 60 tablet, Refills: 0    Associated Diagnoses: Closed fracture of multiple ribs of left side, initial encounter; Chronic right shoulder pain; Primary osteoarthritis of both knees; Urge incontinence; Iron deficiency anemia due to chronic blood loss; Bilateral carpal tunnel syndrome; Disorder of bursae and tendons in shoulder region      gabapentin (NEURONTIN) 300  MG capsule Take 1 capsule (300 mg) by mouth 3 times daily.  Qty: 90 capsule, Refills: 0    Associated Diagnoses: Closed fracture of multiple ribs of left side, initial encounter; Chronic right shoulder pain; Primary osteoarthritis of both knees; Bilateral carpal tunnel syndrome           CONTINUE these medications which have NOT CHANGED    Details   Ascorbic Acid (VITAMIN C) 500 MG CHEW Take 1 tablet by mouth daily.      atorvastatin (LIPITOR) 10 MG tablet Take 10 mg by mouth at bedtime.      BIOTIN PO Take 1 tablet by mouth daily.      Cholecalciferol (VITAMIN D3 PO) Take 1 capsule by mouth daily.      ibuprofen (ADVIL/MOTRIN) 200 MG tablet Take 600 mg by mouth 2 times daily.      magnesium oxide (MAG-OX) 400 MG tablet Take 1 tablet by mouth daily.      Multiple Vitamins-Minerals (MULTIVITAMIN MEN 50+ PO) Take 1 tablet by mouth daily.      omeprazole (PRILOSEC) 40 MG DR capsule Take 40 mg by mouth daily.      polyethylene glycol (MIRALAX) 17 GM/Dose powder Take 1 Capful by mouth daily.           Allergies   No Known Allergies

## 2024-10-26 NOTE — PLAN OF CARE
"End of Shift Summary  For vital signs and complete assessments, please see documentation flowsheets.     Pertinent assessments: A&O- intermittent confusion. Poor safety awareness. VSS on RA. LS diminished with crackles. IS performed. BS active- no BM this shift. Denies nausea. Declined to take bowel regimen this PM. Voids spontaneously. Up to BR Ax1 with walker/gb. PIV SL. PRN ibuprofen and scheduled tylenol given for pain.    Major Shift Events: uneventful    Treatment Plan: symptom management, IV unasyn, fall precautions, IS,  discharge TBD    Bedside Nurse: Janna Byrne RN       Problem: Adult Inpatient Plan of Care  Goal: Plan of Care Review  Description: The Plan of Care Review/Shift note should be completed every shift.  The Outcome Evaluation is a brief statement about your assessment that the patient is improving, declining, or no change.  This information will be displayed automatically on your shift  note.  Outcome: Progressing  Flowsheets (Taken 10/26/2024 0451)  Outcome Evaluation: Scheduled tylenol and prn ibuprofen given for pain. IS performed. Possible discharge on home abx today  Plan of Care Reviewed With: patient  Overall Patient Progress: improving  Goal: Patient-Specific Goal (Individualized)  Description: You can add care plan individualizations to a care plan. Examples of Individualization might be:  \"Parent requests to be called daily at 9am for status\", \"I have a hard time hearing out of my right ear\", or \"Do not touch me to wake me up as it startles  me\".  Outcome: Progressing  Goal: Absence of Hospital-Acquired Illness or Injury  Outcome: Progressing  Intervention: Identify and Manage Fall Risk  Recent Flowsheet Documentation  Taken 10/25/2024 2225 by Janna Byrne RN  Safety Promotion/Fall Prevention:   supervised activity   safety round/check completed   room organization consistent   room near nurse's station  Intervention: Prevent Skin Injury  Recent Flowsheet Documentation  Taken " 10/25/2024 2225 by Janna Byrne RN  Body Position: position changed independently  Intervention: Prevent and Manage VTE (Venous Thromboembolism) Risk  Recent Flowsheet Documentation  Taken 10/25/2024 2225 by Janna Byrne RN  VTE Prevention/Management: SCDs off (sequential compression devices)  Intervention: Prevent Infection  Recent Flowsheet Documentation  Taken 10/25/2024 2225 by Janna Byrne RN  Infection Prevention:   rest/sleep promoted   single patient room provided   hand hygiene promoted  Goal: Optimal Comfort and Wellbeing  Outcome: Progressing  Goal: Readiness for Transition of Care  Outcome: Progressing     Problem: Pain Acute  Goal: Optimal Pain Control and Function  Outcome: Progressing  Intervention: Prevent or Manage Pain  Recent Flowsheet Documentation  Taken 10/25/2024 2225 by Janna Byrne RN  Medication Review/Management: medications reviewed     Problem: Skin Injury Risk Increased  Goal: Skin Health and Integrity  Outcome: Progressing  Intervention: Plan: Nurse Driven Intervention: Moisture Management  Recent Flowsheet Documentation  Taken 10/26/2024 0358 by Janna Byrne RN  Bathing/Skin Care: linen changed  Taken 10/25/2024 2225 by Janna Byrne RN  Moisture Interventions:   Encourage regular toileting   Incontinence pad  Taken 10/25/2024 2000 by Janna Byrne RN  Moisture Interventions:   Encourage regular toileting   No brief in bed   Incontinence pad  Bathing/Skin Care: incontinence care  Intervention: Optimize Skin Protection  Recent Flowsheet Documentation  Taken 10/26/2024 0358 by Janna Byrne RN  Activity Management: ambulated to bathroom  Taken 10/25/2024 2225 by Janna Byrne RN  Activity Management:   activity adjusted per tolerance   ambulated outside room  Head of Bed (HOB) Positioning: HOB at 20-30 degrees     Problem: Hip Fracture Medical Management  Goal: Optimal Coping with Change in Health Status  Outcome: Progressing  Goal: Absence of Bleeding  Outcome:  Progressing  Goal: Effective Bowel Elimination  Outcome: Progressing  Goal: Baseline Cognitive Function Maintained  Outcome: Progressing  Goal: Absence of Embolism  Outcome: Progressing  Intervention: Prevent or Manage Embolism Risk  Recent Flowsheet Documentation  Taken 10/25/2024 2225 by Janna Byrne RN  VTE Prevention/Management: SCDs off (sequential compression devices)  Goal: Fracture Stability  Outcome: Progressing  Goal: Optimal Functional Performance  Outcome: Progressing  Intervention: Promote Optimal Functional Status  Recent Flowsheet Documentation  Taken 10/26/2024 0358 by Janna Byrne RN  Activity Management: ambulated to bathroom  Taken 10/25/2024 2225 by Janna Byrne, RN  Activity Management:   activity adjusted per tolerance   ambulated outside room  Goal: Pain Control and Function  Outcome: Progressing  Goal: Effective Urinary Elimination  Outcome: Progressing       Goal Outcome Evaluation:      Plan of Care Reviewed With: patient    Overall Patient Progress: improvingOverall Patient Progress: improving    Outcome Evaluation: Scheduled tylenol and prn ibuprofen given for pain. IS performed. Possible discharge on oral abx today

## 2024-10-26 NOTE — PROGRESS NOTES
Care Management Discharge Note    Discharge Date: 10/26/2024       Discharge Disposition: Home    Discharge Services: None    Discharge DME:      Discharge Transportation:      Private pay costs discussed: Not applicable      Additional Information:  Patient discharged home. Bedside RN received phone call from daughter that patient would be in need of home care. Bedside RN discussed what she felt was needed and requested home PT and RN. MD made aware and orders entered.   CM notified of need and request sent via [facility] hub of home care need. As patient has already discharged have asked hub to notify patient if they obtain an accepting agency.    Noemi Ferrera RN BSN OCN  Care Coordinator  [facility]  [phone]

## 2024-10-26 NOTE — PLAN OF CARE
"Discharge Note    Patient discharged to home via private vehicle  accompanied by significant other .  IV: Discontinued  Prescriptions e-prescribed to pharmacy.   Belongings reviewed and sent with patient.   Home medications returned to patient: NA  Equipment sent with: and family. Walker  patient verbalizes understanding of discharge instructions. AVS given to patient.  Additional education completed? NA  Problem: Adult Inpatient Plan of Care  Goal: Plan of Care Review  Description: The Plan of Care Review/Shift note should be completed every shift.  The Outcome Evaluation is a brief statement about your assessment that the patient is improving, declining, or no change.  This information will be displayed automatically on your shift  note.  Outcome: Met  Flowsheets (Taken 10/26/2024 4264)  Outcome Evaluation: DischaRGE  Plan of Care Reviewed With:   patient   spouse  Overall Patient Progress: improving  Goal: Patient-Specific Goal (Individualized)  Description: You can add care plan individualizations to a care plan. Examples of Individualization might be:  \"Parent requests to be called daily at 9am for status\", \"I have a hard time hearing out of my right ear\", or \"Do not touch me to wake me up as it startles  me\".  Outcome: Met  Goal: Absence of Hospital-Acquired Illness or Injury  Outcome: Met  Intervention: Identify and Manage Fall Risk  Recent Flowsheet Documentation  Taken 10/26/2024 0952 by Danii Hinojosa, RN  Safety Promotion/Fall Prevention:   activity supervised   nonskid shoes/slippers when out of bed   lighting adjusted  Intervention: Prevent Skin Injury  Recent Flowsheet Documentation  Taken 10/26/2024 0952 by Danii Hinojosa, RN  Skin Protection: silicone foam dressing in place  Intervention: Prevent Infection  Recent Flowsheet Documentation  Taken 10/26/2024 0952 by Danii Hinojosa, RN  Infection Prevention: rest/sleep promoted  Goal: Optimal Comfort and Wellbeing  Outcome: Met  Goal: Readiness for " Transition of Care  Outcome: Met     Problem: Pain Acute  Goal: Optimal Pain Control and Function  Outcome: Met  Intervention: Prevent or Manage Pain  Recent Flowsheet Documentation  Taken 10/26/2024 0952 by Danii Hinojosa RN  Medication Review/Management: medications reviewed     Problem: Skin Injury Risk Increased  Goal: Skin Health and Integrity  Outcome: Met  Intervention: Plan: Nurse Driven Intervention: Moisture Management  Recent Flowsheet Documentation  Taken 10/26/2024 0952 by Danii Hinojosa RN  Moisture Interventions:   Encourage regular toileting   No brief in bed  Bathing/Skin Care: patient refused  Taken 10/26/2024 0800 by Danii Hinojosa RN  Moisture Interventions: No brief in bed  Bathing/Skin Care: patient refused  Intervention: Optimize Skin Protection  Recent Flowsheet Documentation  Taken 10/26/2024 0952 by Danii Hinojosa RN  Skin Protection: silicone foam dressing in place  Activity Management: ambulated to bathroom     Problem: Hip Fracture Medical Management  Goal: Optimal Coping with Change in Health Status  Outcome: Met  Goal: Absence of Bleeding  Outcome: Met  Goal: Effective Bowel Elimination  Outcome: Met  Goal: Baseline Cognitive Function Maintained  Outcome: Met  Goal: Absence of Embolism  Outcome: Met  Goal: Fracture Stability  Outcome: Met  Goal: Optimal Functional Performance  Outcome: Met  Intervention: Promote Optimal Functional Status  Recent Flowsheet Documentation  Taken 10/26/2024 0952 by Danii Hinojosa RN  Activity Management: ambulated to bathroom  Goal: Pain Control and Function  Outcome: Met  Goal: Effective Urinary Elimination  Outcome: Met   Goal Outcome Evaluation:      Plan of Care Reviewed With: patient, spouse    Overall Patient Progress: improvingOverall Patient Progress: improving    Outcome Evaluation: DischaRGE

## 2024-10-27 NOTE — PLAN OF CARE
Physical Therapy Discharge Summary    Reason for therapy discharge:    Discharged to home.    Progress towards therapy goal(s). See goals on Care Plan in [facility] electronic health record for goal details.  Goals partially met.  Barriers to achieving goals:   discharge from facility.    Therapy recommendation(s):    Per treating therapist: Patient demonstrates mobility close to baseline with brief corrected LOB, demonstrates decreased insight of deficits. Anticipate patient will be safe to discharge home with 2WW for mobility and assist from wife with transfer and stair negotiation.

## 2024-10-28 ENCOUNTER — PATIENT OUTREACH (OUTPATIENT)
Dept: CARE COORDINATION | Facility: CLINIC | Age: 69
End: 2024-10-28
Payer: COMMERCIAL

## 2024-10-28 NOTE — LETTER
[facility] CARE COORDINATION  [address]  [facility] [address]    October 29, 2024    Guanako Padilla  [address]  [facility] [address]      Dear aGlen,    I am a clinic care coordinator who works with Roberto Vega MD with the [facility]. I wanted to introduce myself and provide you with my contact information for you to be able to call me with any questions or concerns. I have been trying to reach you recently to introduce Clinic Care Coordination. Below is a description of clinic care coordination and how I can further assist you.       The clinic care coordination team is made up of a registered nurse, , financial resource worker and community health worker who understand the health care system. The goal of clinic care coordination is to help you manage your health and improve access to the health care system. Our team works alongside your provider to assist you in determining your health and social needs. We can help you obtain health care and community resources, providing you with necessary information and education. We can work with you through any barriers and develop a care plan that helps coordinate and strengthen the communication between you and your care team.  Our services are voluntary and are offered without charge to you personally.    Please feel free to contact me with any questions or concerns regarding care coordination and what we can offer.      We are focused on providing you with the highest-quality healthcare experience possible.    Sincerely,     Susanne Rowland RN, BSN, CPHN, CCM  [facility] Ambulatory Care Management  [facility], and [facility]  [email or web address]  Office: [phone]  Employed by [facility]     Enclosed: Additional information on Care Coordination.     WHAT IS CARE COORDINATION?      [facility] Care Coordination supports patients and families dealing with  chronic or complex health conditions, developmental issues, and social service needs. This service is available to patients of all ages, from babies to seniors. When you re facing a difficult decision about caring for yourself or someone you love, we can help you understand your options. We identify and refer you to community resources that help with financial, legal, mental health, transportation, and other issues. We also help with your medical and related education needs.     IS CARE COORDINATION RIGHT FOR ME? Discuss a referral to Care Coordination with your primary care provider or care team member.     HOW CAN I CONNECT WITH CARE COORDINATION?  Contact your clinic.  Speak with your doctor or clinic staff.  Discuss care coordination with hospital staff before discharge.     MEET YOUR CARE COORDINATION TEAM     Registered Nurse Care Coordinator  Provides education on medications, disease management, and new diagnoses.  Addresses concerns about medical conditions and connects you to resources.  Develops patient-centered goals and communicates with patient s care team.     Social Work Care Coordinator  Provides education on emotional wellbeing.  Connects you to a variety of community-based resources.  Communicates with care team and community partners.     Community Health Worker  Identifies health and social barriers and connects you with community resources.  Develops nonclinical patient and family centered goals.  Enhances communication between patients and care teams.     Financial Resource Worker  Assists patients with applying for health [place] ([place], [employer], [employer]).  Helps patients with applying for county benefits.  Connects patients with [facility].

## 2024-10-28 NOTE — PROGRESS NOTES
Clinic Care Coordination Contact  RUST/Voicemail    Clinical Data: Care Coordinator Outreach    Outreach Documentation Number of Outreach Attempt   10/28/2024  10:11 AM 1   10/29/2024   2:35 PM 2     Left message on patient's voicemail with call back information and requested return call.    Plan: Care Coordinator will send care coordination introduction letter with care coordinator contact information and explanation of care coordination services via MyChart. Care Coordinator will do no further outreaches at this time.    Susanne Rowland RN, BSN, CPHN, [facility]  [facility], and [facility]  [email or web address]  Office: [phone]  Employed by [facility]     Clinic Care Coordination Contact  [facility]    Clinical Data: Care Coordinator Outreach    Outreach Documentation Number of Outreach Attempt   10/28/2024  10:11 AM 1     Left message on patient's voicemail with call back information and requested return call.    Plan: Care Coordinator will try to reach patient again in 1-2 business days.    Susanne Rowland RN, BSN, CPHN, [facility] Care Management  [facility], and [facility]  [email or web address]  Office: [phone]  Employed by [facility]

## 2024-10-31 ENCOUNTER — MYC MEDICAL ADVICE (OUTPATIENT)
Dept: SLEEP MEDICINE | Facility: CLINIC | Age: 69
End: 2024-10-31
Payer: COMMERCIAL

## 2024-10-31 DIAGNOSIS — G47.33 OSA (OBSTRUCTIVE SLEEP APNEA): Primary | ICD-10-CM

## 2025-01-22 ENCOUNTER — TRANSFERRED RECORDS (OUTPATIENT)
Dept: HEALTH INFORMATION MANAGEMENT | Facility: CLINIC | Age: 70
End: 2025-01-22
Payer: COMMERCIAL

## 2025-04-01 PROBLEM — F17.200 NICOTINE DEPENDENCE, UNSPECIFIED, UNCOMPLICATED: Status: ACTIVE | Noted: 2020-08-25

## 2025-06-14 ENCOUNTER — HEALTH MAINTENANCE LETTER (OUTPATIENT)
Age: 70
End: 2025-06-14

## 2025-08-04 ENCOUNTER — LAB REQUISITION (OUTPATIENT)
Dept: LAB | Facility: CLINIC | Age: 70
End: 2025-08-04
Payer: COMMERCIAL

## 2025-08-04 DIAGNOSIS — Z11.1 ENCOUNTER FOR SCREENING FOR RESPIRATORY TUBERCULOSIS: ICD-10-CM

## 2025-08-06 LAB
GAMMA INTERFERON BACKGROUND BLD IA-ACNC: 0.03 IU/ML
M TB IFN-G BLD-IMP: NEGATIVE
M TB IFN-G CD4+ BCKGRND COR BLD-ACNC: 9.97 IU/ML
MITOGEN IGNF BCKGRD COR BLD-ACNC: 0.08 IU/ML
MITOGEN IGNF BCKGRD COR BLD-ACNC: 0.09 IU/ML
QUANTIFERON MITOGEN: 10 IU/ML
QUANTIFERON NIL TUBE: 0.03 IU/ML
QUANTIFERON TB1 TUBE: 0.11 IU/ML
QUANTIFERON TB2 TUBE: 0.12

## (undated) RX ORDER — IBUPROFEN 400 MG/1
TABLET, FILM COATED ORAL
Status: DISPENSED
Start: 2019-11-13

## (undated) RX ORDER — LIDOCAINE HYDROCHLORIDE AND EPINEPHRINE 10; 10 MG/ML; UG/ML
INJECTION, SOLUTION INFILTRATION; PERINEURAL
Status: DISPENSED
Start: 2024-10-21